# Patient Record
Sex: MALE | Race: WHITE | NOT HISPANIC OR LATINO | Employment: OTHER | URBAN - METROPOLITAN AREA
[De-identification: names, ages, dates, MRNs, and addresses within clinical notes are randomized per-mention and may not be internally consistent; named-entity substitution may affect disease eponyms.]

---

## 2017-01-04 ENCOUNTER — COMMUNICATION - HEALTHEAST (OUTPATIENT)
Dept: FAMILY MEDICINE | Facility: CLINIC | Age: 72
End: 2017-01-04

## 2017-01-04 DIAGNOSIS — E78.5 HYPERLIPIDEMIA: ICD-10-CM

## 2017-01-04 DIAGNOSIS — I10 ESSENTIAL HYPERTENSION: ICD-10-CM

## 2017-01-17 ENCOUNTER — AMBULATORY - HEALTHEAST (OUTPATIENT)
Dept: FAMILY MEDICINE | Facility: CLINIC | Age: 72
End: 2017-01-17

## 2017-01-17 DIAGNOSIS — N28.9 RENAL INSUFFICIENCY: ICD-10-CM

## 2017-01-18 ENCOUNTER — COMMUNICATION - HEALTHEAST (OUTPATIENT)
Dept: FAMILY MEDICINE | Facility: CLINIC | Age: 72
End: 2017-01-18

## 2017-01-18 DIAGNOSIS — E11.9 DIABETES (H): ICD-10-CM

## 2017-01-20 ENCOUNTER — AMBULATORY - HEALTHEAST (OUTPATIENT)
Dept: NURSING | Facility: CLINIC | Age: 72
End: 2017-01-20

## 2017-01-20 ENCOUNTER — AMBULATORY - HEALTHEAST (OUTPATIENT)
Dept: LAB | Facility: CLINIC | Age: 72
End: 2017-01-20

## 2017-01-20 DIAGNOSIS — I10 ESSENTIAL HYPERTENSION WITH GOAL BLOOD PRESSURE LESS THAN 130/80: ICD-10-CM

## 2017-01-20 DIAGNOSIS — N28.9 RENAL INSUFFICIENCY: ICD-10-CM

## 2017-01-20 LAB
CREAT SERPL-MCNC: 1.15 MG/DL (ref 0.7–1.3)
GFR SERPL CREATININE-BSD FRML MDRD: >60 ML/MIN/1.73M2

## 2017-01-24 ENCOUNTER — COMMUNICATION - HEALTHEAST (OUTPATIENT)
Dept: FAMILY MEDICINE | Facility: CLINIC | Age: 72
End: 2017-01-24

## 2017-01-24 DIAGNOSIS — E11.9 TYPE 2 DIABETES MELLITUS (H): ICD-10-CM

## 2017-02-09 ENCOUNTER — OFFICE VISIT - HEALTHEAST (OUTPATIENT)
Dept: FAMILY MEDICINE | Facility: CLINIC | Age: 72
End: 2017-02-09

## 2017-02-09 DIAGNOSIS — N28.9 RENAL INSUFFICIENCY: ICD-10-CM

## 2017-02-09 DIAGNOSIS — E66.01 MORBID OBESITY, UNSPECIFIED OBESITY TYPE (H): ICD-10-CM

## 2017-02-09 DIAGNOSIS — E78.2 MIXED HYPERLIPIDEMIA: ICD-10-CM

## 2017-02-09 DIAGNOSIS — R60.9 EDEMA: ICD-10-CM

## 2017-02-09 DIAGNOSIS — E11.9 DIABETES MELLITUS (H): ICD-10-CM

## 2017-02-09 DIAGNOSIS — Z00.00 HEALTH CARE MAINTENANCE: ICD-10-CM

## 2017-02-09 LAB
CHOLEST SERPL-MCNC: 114 MG/DL
FASTING STATUS PATIENT QL REPORTED: YES
HBA1C MFR BLD: 7.1 % (ref 4.2–6.1)
HDLC SERPL-MCNC: 43 MG/DL
LDLC SERPL CALC-MCNC: 50 MG/DL
PSA SERPL-MCNC: 3.3 NG/ML (ref 0–6.5)
TRIGL SERPL-MCNC: 104 MG/DL

## 2017-02-13 ENCOUNTER — COMMUNICATION - HEALTHEAST (OUTPATIENT)
Dept: FAMILY MEDICINE | Facility: CLINIC | Age: 72
End: 2017-02-13

## 2017-03-29 ENCOUNTER — COMMUNICATION - HEALTHEAST (OUTPATIENT)
Dept: FAMILY MEDICINE | Facility: CLINIC | Age: 72
End: 2017-03-29

## 2017-03-29 DIAGNOSIS — E78.5 HYPERLIPIDEMIA: ICD-10-CM

## 2017-04-03 ENCOUNTER — COMMUNICATION - HEALTHEAST (OUTPATIENT)
Dept: FAMILY MEDICINE | Facility: CLINIC | Age: 72
End: 2017-04-03

## 2017-04-03 DIAGNOSIS — E78.5 HYPERLIPEMIA: ICD-10-CM

## 2017-04-24 ENCOUNTER — COMMUNICATION - HEALTHEAST (OUTPATIENT)
Dept: FAMILY MEDICINE | Facility: CLINIC | Age: 72
End: 2017-04-24

## 2017-04-24 DIAGNOSIS — E11.9 TYPE 2 DIABETES MELLITUS (H): ICD-10-CM

## 2017-05-09 ENCOUNTER — COMMUNICATION - HEALTHEAST (OUTPATIENT)
Dept: NURSING | Facility: CLINIC | Age: 72
End: 2017-05-09

## 2017-05-09 ENCOUNTER — OFFICE VISIT - HEALTHEAST (OUTPATIENT)
Dept: NURSING | Facility: CLINIC | Age: 72
End: 2017-05-09

## 2017-05-09 DIAGNOSIS — E78.1 PURE HYPERGLYCERIDEMIA: ICD-10-CM

## 2017-05-09 DIAGNOSIS — E11.9 TYPE 2 DIABETES MELLITUS WITHOUT COMPLICATION, WITHOUT LONG-TERM CURRENT USE OF INSULIN (H): ICD-10-CM

## 2017-05-09 DIAGNOSIS — M10.9 GOUTY ARTHROPATHY: ICD-10-CM

## 2017-05-09 DIAGNOSIS — E78.5 DYSLIPIDEMIA: ICD-10-CM

## 2017-05-09 DIAGNOSIS — I10 ESSENTIAL HYPERTENSION WITH GOAL BLOOD PRESSURE LESS THAN 140/90: ICD-10-CM

## 2017-05-09 LAB — HBA1C MFR BLD: 7.3 % (ref 3.5–6)

## 2017-06-08 ENCOUNTER — RECORDS - HEALTHEAST (OUTPATIENT)
Dept: ADMINISTRATIVE | Facility: OTHER | Age: 72
End: 2017-06-08

## 2017-07-09 ENCOUNTER — COMMUNICATION - HEALTHEAST (OUTPATIENT)
Dept: FAMILY MEDICINE | Facility: CLINIC | Age: 72
End: 2017-07-09

## 2017-07-09 DIAGNOSIS — I10 ESSENTIAL HYPERTENSION: ICD-10-CM

## 2017-07-21 ENCOUNTER — RECORDS - HEALTHEAST (OUTPATIENT)
Dept: ADMINISTRATIVE | Facility: OTHER | Age: 72
End: 2017-07-21

## 2017-08-10 ENCOUNTER — OFFICE VISIT - HEALTHEAST (OUTPATIENT)
Dept: FAMILY MEDICINE | Facility: CLINIC | Age: 72
End: 2017-08-10

## 2017-08-10 DIAGNOSIS — E66.01 MORBID OBESITY, UNSPECIFIED OBESITY TYPE (H): ICD-10-CM

## 2017-08-10 DIAGNOSIS — R60.9 EDEMA: ICD-10-CM

## 2017-08-10 DIAGNOSIS — E11.9 DIABETES MELLITUS (H): ICD-10-CM

## 2017-08-10 DIAGNOSIS — N40.0 BPH (BENIGN PROSTATIC HYPERPLASIA): ICD-10-CM

## 2017-08-10 DIAGNOSIS — I10 ESSENTIAL HYPERTENSION: ICD-10-CM

## 2017-08-10 DIAGNOSIS — E78.2 MIXED HYPERLIPIDEMIA: ICD-10-CM

## 2017-08-10 LAB
CHOLEST SERPL-MCNC: 108 MG/DL
FASTING STATUS PATIENT QL REPORTED: YES
HBA1C MFR BLD: 6.4 % (ref 3.5–6)
HDLC SERPL-MCNC: 41 MG/DL
LDLC SERPL CALC-MCNC: 42 MG/DL
TRIGL SERPL-MCNC: 125 MG/DL

## 2017-08-15 ENCOUNTER — COMMUNICATION - HEALTHEAST (OUTPATIENT)
Dept: FAMILY MEDICINE | Facility: CLINIC | Age: 72
End: 2017-08-15

## 2017-08-24 ENCOUNTER — RECORDS - HEALTHEAST (OUTPATIENT)
Dept: ADMINISTRATIVE | Facility: OTHER | Age: 72
End: 2017-08-24

## 2017-08-31 ENCOUNTER — AMBULATORY - HEALTHEAST (OUTPATIENT)
Dept: LAB | Facility: CLINIC | Age: 72
End: 2017-08-31

## 2017-08-31 DIAGNOSIS — R60.9 EDEMA: ICD-10-CM

## 2017-08-31 LAB
CREAT SERPL-MCNC: 1.12 MG/DL (ref 0.7–1.3)
GFR SERPL CREATININE-BSD FRML MDRD: >60 ML/MIN/1.73M2

## 2017-09-05 ENCOUNTER — COMMUNICATION - HEALTHEAST (OUTPATIENT)
Dept: FAMILY MEDICINE | Facility: CLINIC | Age: 72
End: 2017-09-05

## 2017-10-04 ENCOUNTER — COMMUNICATION - HEALTHEAST (OUTPATIENT)
Dept: FAMILY MEDICINE | Facility: CLINIC | Age: 72
End: 2017-10-04

## 2017-10-04 DIAGNOSIS — I10 ESSENTIAL HYPERTENSION: ICD-10-CM

## 2017-10-04 DIAGNOSIS — I10 HTN (HYPERTENSION): ICD-10-CM

## 2017-10-17 ENCOUNTER — COMMUNICATION - HEALTHEAST (OUTPATIENT)
Dept: FAMILY MEDICINE | Facility: CLINIC | Age: 72
End: 2017-10-17

## 2017-10-17 DIAGNOSIS — I10 HTN (HYPERTENSION): ICD-10-CM

## 2017-10-30 ENCOUNTER — COMMUNICATION - HEALTHEAST (OUTPATIENT)
Dept: FAMILY MEDICINE | Facility: CLINIC | Age: 72
End: 2017-10-30

## 2017-10-30 DIAGNOSIS — Z79.899 MEDICATION MANAGEMENT: ICD-10-CM

## 2017-11-07 ENCOUNTER — RECORDS - HEALTHEAST (OUTPATIENT)
Dept: ADMINISTRATIVE | Facility: OTHER | Age: 72
End: 2017-11-07

## 2017-11-07 ENCOUNTER — COMMUNICATION - HEALTHEAST (OUTPATIENT)
Dept: FAMILY MEDICINE | Facility: CLINIC | Age: 72
End: 2017-11-07

## 2017-11-16 ENCOUNTER — RECORDS - HEALTHEAST (OUTPATIENT)
Dept: ADMINISTRATIVE | Facility: OTHER | Age: 72
End: 2017-11-16

## 2017-11-27 ENCOUNTER — RECORDS - HEALTHEAST (OUTPATIENT)
Dept: ADMINISTRATIVE | Facility: OTHER | Age: 72
End: 2017-11-27

## 2017-11-29 ENCOUNTER — COMMUNICATION - HEALTHEAST (OUTPATIENT)
Dept: FAMILY MEDICINE | Facility: CLINIC | Age: 72
End: 2017-11-29

## 2017-12-15 ENCOUNTER — OFFICE VISIT - HEALTHEAST (OUTPATIENT)
Dept: FAMILY MEDICINE | Facility: CLINIC | Age: 72
End: 2017-12-15

## 2017-12-15 DIAGNOSIS — Z01.818 PREOP EXAMINATION: ICD-10-CM

## 2017-12-15 DIAGNOSIS — Z23 NEED FOR VACCINATION: ICD-10-CM

## 2017-12-15 LAB
ATRIAL RATE - MUSE: 81 BPM
CREAT SERPL-MCNC: 1.06 MG/DL (ref 0.7–1.3)
DIASTOLIC BLOOD PRESSURE - MUSE: NORMAL MMHG
GFR SERPL CREATININE-BSD FRML MDRD: >60 ML/MIN/1.73M2
INTERPRETATION ECG - MUSE: NORMAL
P AXIS - MUSE: 29 DEGREES
PR INTERVAL - MUSE: 170 MS
QRS DURATION - MUSE: 98 MS
QT - MUSE: 354 MS
QTC - MUSE: 411 MS
R AXIS - MUSE: -1 DEGREES
SYSTOLIC BLOOD PRESSURE - MUSE: NORMAL MMHG
T AXIS - MUSE: 43 DEGREES
VENTRICULAR RATE- MUSE: 81 BPM

## 2017-12-15 ASSESSMENT — MIFFLIN-ST. JEOR: SCORE: 2264.67

## 2017-12-22 ASSESSMENT — MIFFLIN-ST. JEOR: SCORE: 2264.67

## 2017-12-27 ENCOUNTER — ANESTHESIA - HEALTHEAST (OUTPATIENT)
Dept: SURGERY | Facility: HOSPITAL | Age: 72
End: 2017-12-27

## 2017-12-27 ENCOUNTER — SURGERY - HEALTHEAST (OUTPATIENT)
Dept: SURGERY | Facility: HOSPITAL | Age: 72
End: 2017-12-27

## 2017-12-27 ASSESSMENT — MIFFLIN-ST. JEOR: SCORE: 2189.37

## 2017-12-28 ASSESSMENT — MIFFLIN-ST. JEOR: SCORE: 2229.74

## 2017-12-31 ENCOUNTER — COMMUNICATION - HEALTHEAST (OUTPATIENT)
Dept: FAMILY MEDICINE | Facility: CLINIC | Age: 72
End: 2017-12-31

## 2018-01-02 ENCOUNTER — OFFICE VISIT - HEALTHEAST (OUTPATIENT)
Dept: GERIATRICS | Facility: CLINIC | Age: 73
End: 2018-01-02

## 2018-01-02 DIAGNOSIS — R60.0 LOWER EXTREMITY EDEMA: ICD-10-CM

## 2018-01-02 DIAGNOSIS — R52 PAIN MANAGEMENT: ICD-10-CM

## 2018-01-02 DIAGNOSIS — E11.9 DIABETES MELLITUS (H): ICD-10-CM

## 2018-01-02 DIAGNOSIS — I10 HYPERTENSION: ICD-10-CM

## 2018-01-02 DIAGNOSIS — Z90.79 STATUS POST PROSTATECTOMY: ICD-10-CM

## 2018-01-02 DIAGNOSIS — N40.0 BPH (BENIGN PROSTATIC HYPERPLASIA): ICD-10-CM

## 2018-01-04 ENCOUNTER — OFFICE VISIT - HEALTHEAST (OUTPATIENT)
Dept: GERIATRICS | Facility: CLINIC | Age: 73
End: 2018-01-04

## 2018-01-04 DIAGNOSIS — M10.9 GOUTY ARTHROPATHY: ICD-10-CM

## 2018-01-04 DIAGNOSIS — Z90.79 S/P PROSTATECTOMY: ICD-10-CM

## 2018-01-04 DIAGNOSIS — I10 ESSENTIAL HYPERTENSION: ICD-10-CM

## 2018-01-04 DIAGNOSIS — E11.9 TYPE 2 DIABETES MELLITUS (H): ICD-10-CM

## 2018-01-08 ENCOUNTER — COMMUNICATION - HEALTHEAST (OUTPATIENT)
Dept: FAMILY MEDICINE | Facility: CLINIC | Age: 73
End: 2018-01-08

## 2018-01-08 ENCOUNTER — AMBULATORY - HEALTHEAST (OUTPATIENT)
Dept: GERIATRICS | Facility: CLINIC | Age: 73
End: 2018-01-08

## 2018-01-08 ENCOUNTER — COMMUNICATION - HEALTHEAST (OUTPATIENT)
Dept: GERIATRICS | Facility: CLINIC | Age: 73
End: 2018-01-08

## 2018-01-16 ENCOUNTER — OFFICE VISIT - HEALTHEAST (OUTPATIENT)
Dept: FAMILY MEDICINE | Facility: CLINIC | Age: 73
End: 2018-01-16

## 2018-01-16 DIAGNOSIS — I10 ESSENTIAL HYPERTENSION: ICD-10-CM

## 2018-01-16 DIAGNOSIS — F41.9 ANXIETY: ICD-10-CM

## 2018-01-16 DIAGNOSIS — M79.671 PAIN OF RIGHT HEEL: ICD-10-CM

## 2018-01-16 DIAGNOSIS — Z90.79 S/P PROSTATECTOMY: ICD-10-CM

## 2018-02-06 ENCOUNTER — COMMUNICATION - HEALTHEAST (OUTPATIENT)
Dept: FAMILY MEDICINE | Facility: CLINIC | Age: 73
End: 2018-02-06

## 2018-02-06 DIAGNOSIS — Z79.899 MEDICATION MANAGEMENT: ICD-10-CM

## 2018-02-15 ENCOUNTER — COMMUNICATION - HEALTHEAST (OUTPATIENT)
Dept: FAMILY MEDICINE | Facility: CLINIC | Age: 73
End: 2018-02-15

## 2018-02-15 ENCOUNTER — OFFICE VISIT - HEALTHEAST (OUTPATIENT)
Dept: FAMILY MEDICINE | Facility: CLINIC | Age: 73
End: 2018-02-15

## 2018-02-15 DIAGNOSIS — Z12.11 SCREEN FOR COLON CANCER: ICD-10-CM

## 2018-02-15 DIAGNOSIS — F41.9 ANXIETY: ICD-10-CM

## 2018-02-15 DIAGNOSIS — I10 ESSENTIAL HYPERTENSION: ICD-10-CM

## 2018-02-15 DIAGNOSIS — E11.9 TYPE 2 DIABETES MELLITUS (H): ICD-10-CM

## 2018-02-15 LAB
CREAT UR-MCNC: 36.3 MG/DL
HBA1C MFR BLD: 5.7 % (ref 3.5–6)
MICROALBUMIN UR-MCNC: 0.72 MG/DL (ref 0–1.99)
MICROALBUMIN/CREAT UR: 19.8 MG/G

## 2018-02-26 ENCOUNTER — AMBULATORY - HEALTHEAST (OUTPATIENT)
Dept: NURSING | Facility: CLINIC | Age: 73
End: 2018-02-26

## 2018-02-26 ENCOUNTER — COMMUNICATION - HEALTHEAST (OUTPATIENT)
Dept: FAMILY MEDICINE | Facility: CLINIC | Age: 73
End: 2018-02-26

## 2018-03-05 ENCOUNTER — COMMUNICATION - HEALTHEAST (OUTPATIENT)
Dept: FAMILY MEDICINE | Facility: CLINIC | Age: 73
End: 2018-03-05

## 2018-03-05 DIAGNOSIS — I10 ESSENTIAL HYPERTENSION: ICD-10-CM

## 2018-03-14 ENCOUNTER — COMMUNICATION - HEALTHEAST (OUTPATIENT)
Dept: FAMILY MEDICINE | Facility: CLINIC | Age: 73
End: 2018-03-14

## 2018-03-14 ENCOUNTER — RECORDS - HEALTHEAST (OUTPATIENT)
Dept: ADMINISTRATIVE | Facility: OTHER | Age: 73
End: 2018-03-14

## 2018-03-21 ENCOUNTER — RECORDS - HEALTHEAST (OUTPATIENT)
Dept: ADMINISTRATIVE | Facility: OTHER | Age: 73
End: 2018-03-21

## 2018-04-09 ENCOUNTER — COMMUNICATION - HEALTHEAST (OUTPATIENT)
Dept: FAMILY MEDICINE | Facility: CLINIC | Age: 73
End: 2018-04-09

## 2018-04-09 ENCOUNTER — RECORDS - HEALTHEAST (OUTPATIENT)
Dept: ADMINISTRATIVE | Facility: OTHER | Age: 73
End: 2018-04-09

## 2018-04-09 DIAGNOSIS — F41.9 ANXIETY: ICD-10-CM

## 2018-04-16 ENCOUNTER — RECORDS - HEALTHEAST (OUTPATIENT)
Dept: ADMINISTRATIVE | Facility: OTHER | Age: 73
End: 2018-04-16

## 2018-04-16 ENCOUNTER — COMMUNICATION - HEALTHEAST (OUTPATIENT)
Dept: FAMILY MEDICINE | Facility: CLINIC | Age: 73
End: 2018-04-16

## 2018-04-16 DIAGNOSIS — I10 HTN (HYPERTENSION): ICD-10-CM

## 2018-04-17 ENCOUNTER — COMMUNICATION - HEALTHEAST (OUTPATIENT)
Dept: FAMILY MEDICINE | Facility: CLINIC | Age: 73
End: 2018-04-17

## 2018-05-01 ENCOUNTER — COMMUNICATION - HEALTHEAST (OUTPATIENT)
Dept: FAMILY MEDICINE | Facility: CLINIC | Age: 73
End: 2018-05-01

## 2018-05-01 DIAGNOSIS — Z79.899 MEDICATION MANAGEMENT: ICD-10-CM

## 2018-05-01 DIAGNOSIS — E78.5 DYSLIPIDEMIA: ICD-10-CM

## 2018-05-01 DIAGNOSIS — E11.9 TYPE 2 DIABETES MELLITUS WITHOUT COMPLICATION, WITHOUT LONG-TERM CURRENT USE OF INSULIN (H): ICD-10-CM

## 2018-05-02 ENCOUNTER — COMMUNICATION - HEALTHEAST (OUTPATIENT)
Dept: FAMILY MEDICINE | Facility: CLINIC | Age: 73
End: 2018-05-02

## 2018-05-02 DIAGNOSIS — F41.9 ANXIETY: ICD-10-CM

## 2018-05-02 DIAGNOSIS — I10 ESSENTIAL HYPERTENSION: ICD-10-CM

## 2018-05-15 ENCOUNTER — OFFICE VISIT - HEALTHEAST (OUTPATIENT)
Dept: FAMILY MEDICINE | Facility: CLINIC | Age: 73
End: 2018-05-15

## 2018-05-15 DIAGNOSIS — E78.2 MIXED HYPERLIPIDEMIA: ICD-10-CM

## 2018-05-15 DIAGNOSIS — I10 ESSENTIAL HYPERTENSION: ICD-10-CM

## 2018-05-15 DIAGNOSIS — E11.9 DIABETES (H): ICD-10-CM

## 2018-05-25 ENCOUNTER — AMBULATORY - HEALTHEAST (OUTPATIENT)
Dept: LAB | Facility: CLINIC | Age: 73
End: 2018-05-25

## 2018-05-25 DIAGNOSIS — E11.9 DIABETES (H): ICD-10-CM

## 2018-05-25 LAB
ALBUMIN SERPL-MCNC: 4 G/DL (ref 3.5–5)
ALP SERPL-CCNC: 66 U/L (ref 45–120)
ALT SERPL W P-5'-P-CCNC: 18 U/L (ref 0–45)
ANION GAP SERPL CALCULATED.3IONS-SCNC: 13 MMOL/L (ref 5–18)
AST SERPL W P-5'-P-CCNC: 17 U/L (ref 0–40)
BILIRUB SERPL-MCNC: 0.9 MG/DL (ref 0–1)
BUN SERPL-MCNC: 26 MG/DL (ref 8–28)
CALCIUM SERPL-MCNC: 9.8 MG/DL (ref 8.5–10.5)
CHLORIDE BLD-SCNC: 100 MMOL/L (ref 98–107)
CHOLEST SERPL-MCNC: 105 MG/DL
CO2 SERPL-SCNC: 26 MMOL/L (ref 22–31)
CREAT SERPL-MCNC: 1.31 MG/DL (ref 0.7–1.3)
FASTING STATUS PATIENT QL REPORTED: YES
GFR SERPL CREATININE-BSD FRML MDRD: 54 ML/MIN/1.73M2
GLUCOSE BLD-MCNC: 99 MG/DL (ref 70–125)
HBA1C MFR BLD: 5.9 % (ref 3.5–6)
HDLC SERPL-MCNC: 43 MG/DL
LDLC SERPL CALC-MCNC: 39 MG/DL
POTASSIUM BLD-SCNC: 4.4 MMOL/L (ref 3.5–5)
PROT SERPL-MCNC: 7.5 G/DL (ref 6–8)
SODIUM SERPL-SCNC: 139 MMOL/L (ref 136–145)
TRIGL SERPL-MCNC: 116 MG/DL

## 2018-05-30 ENCOUNTER — COMMUNICATION - HEALTHEAST (OUTPATIENT)
Dept: FAMILY MEDICINE | Facility: CLINIC | Age: 73
End: 2018-05-30

## 2018-07-14 ENCOUNTER — COMMUNICATION - HEALTHEAST (OUTPATIENT)
Dept: FAMILY MEDICINE | Facility: CLINIC | Age: 73
End: 2018-07-14

## 2018-07-14 DIAGNOSIS — I10 HTN (HYPERTENSION): ICD-10-CM

## 2018-07-26 ENCOUNTER — COMMUNICATION - HEALTHEAST (OUTPATIENT)
Dept: FAMILY MEDICINE | Facility: CLINIC | Age: 73
End: 2018-07-26

## 2018-07-26 DIAGNOSIS — I10 HTN (HYPERTENSION): ICD-10-CM

## 2018-07-28 ENCOUNTER — COMMUNICATION - HEALTHEAST (OUTPATIENT)
Dept: FAMILY MEDICINE | Facility: CLINIC | Age: 73
End: 2018-07-28

## 2018-07-28 DIAGNOSIS — Z79.899 MEDICATION MANAGEMENT: ICD-10-CM

## 2018-07-28 DIAGNOSIS — E11.9 TYPE 2 DIABETES MELLITUS WITHOUT COMPLICATION, WITHOUT LONG-TERM CURRENT USE OF INSULIN (H): ICD-10-CM

## 2018-11-30 ENCOUNTER — OFFICE VISIT - HEALTHEAST (OUTPATIENT)
Dept: FAMILY MEDICINE | Facility: CLINIC | Age: 73
End: 2018-11-30

## 2018-11-30 DIAGNOSIS — F41.9 ANXIETY: ICD-10-CM

## 2018-11-30 DIAGNOSIS — E11.9 DIABETES MELLITUS (H): ICD-10-CM

## 2018-11-30 DIAGNOSIS — E66.01 MORBID OBESITY (H): ICD-10-CM

## 2018-11-30 DIAGNOSIS — Z12.5 ENCOUNTER FOR SCREENING FOR MALIGNANT NEOPLASM OF PROSTATE: ICD-10-CM

## 2018-11-30 DIAGNOSIS — Z00.00 HEALTH CARE MAINTENANCE: ICD-10-CM

## 2018-11-30 DIAGNOSIS — E78.2 MIXED HYPERLIPIDEMIA: ICD-10-CM

## 2018-11-30 LAB
ALBUMIN SERPL-MCNC: 3.5 G/DL (ref 3.5–5)
ALP SERPL-CCNC: 56 U/L (ref 45–120)
ALT SERPL W P-5'-P-CCNC: 26 U/L (ref 0–45)
ANION GAP SERPL CALCULATED.3IONS-SCNC: 11 MMOL/L (ref 5–18)
AST SERPL W P-5'-P-CCNC: 19 U/L (ref 0–40)
BILIRUB SERPL-MCNC: 0.7 MG/DL (ref 0–1)
BUN SERPL-MCNC: 15 MG/DL (ref 8–28)
CALCIUM SERPL-MCNC: 9.2 MG/DL (ref 8.5–10.5)
CHLORIDE BLD-SCNC: 101 MMOL/L (ref 98–107)
CHOLEST SERPL-MCNC: 109 MG/DL
CO2 SERPL-SCNC: 28 MMOL/L (ref 22–31)
CREAT SERPL-MCNC: 1.15 MG/DL (ref 0.7–1.3)
FASTING STATUS PATIENT QL REPORTED: YES
GFR SERPL CREATININE-BSD FRML MDRD: >60 ML/MIN/1.73M2
GLUCOSE BLD-MCNC: 113 MG/DL (ref 70–125)
HBA1C MFR BLD: 6.4 % (ref 3.5–6)
HDLC SERPL-MCNC: 42 MG/DL
LDLC SERPL CALC-MCNC: 35 MG/DL
POTASSIUM BLD-SCNC: 4.2 MMOL/L (ref 3.5–5)
PROT SERPL-MCNC: 6.8 G/DL (ref 6–8)
SODIUM SERPL-SCNC: 140 MMOL/L (ref 136–145)
TRIGL SERPL-MCNC: 158 MG/DL

## 2018-12-03 ENCOUNTER — COMMUNICATION - HEALTHEAST (OUTPATIENT)
Dept: FAMILY MEDICINE | Facility: CLINIC | Age: 73
End: 2018-12-03

## 2018-12-24 ENCOUNTER — COMMUNICATION - HEALTHEAST (OUTPATIENT)
Dept: FAMILY MEDICINE | Facility: CLINIC | Age: 73
End: 2018-12-24

## 2018-12-24 DIAGNOSIS — E11.9 DIABETES (H): ICD-10-CM

## 2018-12-24 RX ORDER — GLUCOSAMINE HCL/CHONDROITIN SU 500-400 MG
CAPSULE ORAL
Qty: 200 STRIP | Refills: 3 | Status: SHIPPED | OUTPATIENT
Start: 2018-12-24

## 2019-01-21 ENCOUNTER — COMMUNICATION - HEALTHEAST (OUTPATIENT)
Dept: FAMILY MEDICINE | Facility: CLINIC | Age: 74
End: 2019-01-21

## 2019-01-21 DIAGNOSIS — F41.9 ANXIETY: ICD-10-CM

## 2019-01-21 DIAGNOSIS — E78.5 DYSLIPIDEMIA: ICD-10-CM

## 2019-01-23 ENCOUNTER — COMMUNICATION - HEALTHEAST (OUTPATIENT)
Dept: FAMILY MEDICINE | Facility: CLINIC | Age: 74
End: 2019-01-23

## 2019-01-23 DIAGNOSIS — I10 HTN (HYPERTENSION): ICD-10-CM

## 2019-01-25 ENCOUNTER — COMMUNICATION - HEALTHEAST (OUTPATIENT)
Dept: FAMILY MEDICINE | Facility: CLINIC | Age: 74
End: 2019-01-25

## 2019-01-25 DIAGNOSIS — E78.5 DYSLIPIDEMIA: ICD-10-CM

## 2019-01-25 DIAGNOSIS — F41.9 ANXIETY: ICD-10-CM

## 2019-04-16 ENCOUNTER — COMMUNICATION - HEALTHEAST (OUTPATIENT)
Dept: FAMILY MEDICINE | Facility: CLINIC | Age: 74
End: 2019-04-16

## 2019-04-16 DIAGNOSIS — I10 HTN (HYPERTENSION): ICD-10-CM

## 2019-05-07 ENCOUNTER — COMMUNICATION - HEALTHEAST (OUTPATIENT)
Dept: FAMILY MEDICINE | Facility: CLINIC | Age: 74
End: 2019-05-07

## 2019-05-07 DIAGNOSIS — Z79.899 MEDICATION MANAGEMENT: ICD-10-CM

## 2019-05-07 DIAGNOSIS — E11.9 TYPE 2 DIABETES MELLITUS WITHOUT COMPLICATION, WITHOUT LONG-TERM CURRENT USE OF INSULIN (H): ICD-10-CM

## 2019-05-29 ENCOUNTER — OFFICE VISIT - HEALTHEAST (OUTPATIENT)
Dept: FAMILY MEDICINE | Facility: CLINIC | Age: 74
End: 2019-05-29

## 2019-05-29 DIAGNOSIS — E66.813 CLASS 3 SEVERE OBESITY WITH SERIOUS COMORBIDITY AND BODY MASS INDEX (BMI) OF 50.0 TO 59.9 IN ADULT, UNSPECIFIED OBESITY TYPE (H): ICD-10-CM

## 2019-05-29 DIAGNOSIS — Z00.00 HEALTH CARE MAINTENANCE: ICD-10-CM

## 2019-05-29 DIAGNOSIS — R60.9 EDEMA, UNSPECIFIED TYPE: ICD-10-CM

## 2019-05-29 DIAGNOSIS — E66.01 CLASS 3 SEVERE OBESITY WITH SERIOUS COMORBIDITY AND BODY MASS INDEX (BMI) OF 50.0 TO 59.9 IN ADULT, UNSPECIFIED OBESITY TYPE (H): ICD-10-CM

## 2019-05-29 DIAGNOSIS — F41.9 ANXIETY: ICD-10-CM

## 2019-05-29 DIAGNOSIS — E11.9 DIABETES MELLITUS (H): ICD-10-CM

## 2019-05-29 DIAGNOSIS — I10 ESSENTIAL HYPERTENSION: ICD-10-CM

## 2019-05-29 DIAGNOSIS — Z79.899 MEDICATION MANAGEMENT: ICD-10-CM

## 2019-05-29 DIAGNOSIS — E11.9 TYPE 2 DIABETES MELLITUS WITHOUT COMPLICATION, WITHOUT LONG-TERM CURRENT USE OF INSULIN (H): ICD-10-CM

## 2019-05-29 DIAGNOSIS — Z12.5 ENCOUNTER FOR SCREENING FOR MALIGNANT NEOPLASM OF PROSTATE: ICD-10-CM

## 2019-05-29 LAB
ALBUMIN SERPL-MCNC: 3.8 G/DL (ref 3.5–5)
ALP SERPL-CCNC: 59 U/L (ref 45–120)
ALT SERPL W P-5'-P-CCNC: 28 U/L (ref 0–45)
ANION GAP SERPL CALCULATED.3IONS-SCNC: 13 MMOL/L (ref 5–18)
AST SERPL W P-5'-P-CCNC: 19 U/L (ref 0–40)
BILIRUB SERPL-MCNC: 0.6 MG/DL (ref 0–1)
BUN SERPL-MCNC: 19 MG/DL (ref 8–28)
CALCIUM SERPL-MCNC: 9.6 MG/DL (ref 8.5–10.5)
CHLORIDE BLD-SCNC: 98 MMOL/L (ref 98–107)
CHOLEST SERPL-MCNC: 101 MG/DL
CO2 SERPL-SCNC: 28 MMOL/L (ref 22–31)
CREAT SERPL-MCNC: 1.12 MG/DL (ref 0.7–1.3)
CREAT UR-MCNC: 32.4 MG/DL
FASTING STATUS PATIENT QL REPORTED: YES
GFR SERPL CREATININE-BSD FRML MDRD: >60 ML/MIN/1.73M2
GLUCOSE BLD-MCNC: 115 MG/DL (ref 70–125)
HBA1C MFR BLD: 6.2 % (ref 3.5–6)
HDLC SERPL-MCNC: 44 MG/DL
LDLC SERPL CALC-MCNC: 29 MG/DL
MICROALBUMIN UR-MCNC: <0.5 MG/DL (ref 0–1.99)
MICROALBUMIN/CREAT UR: NORMAL MG/G
POTASSIUM BLD-SCNC: 3.8 MMOL/L (ref 3.5–5)
PROT SERPL-MCNC: 7.2 G/DL (ref 6–8)
PSA SERPL-MCNC: 0.6 NG/ML (ref 0–6.5)
SODIUM SERPL-SCNC: 139 MMOL/L (ref 136–145)
TRIGL SERPL-MCNC: 138 MG/DL

## 2019-05-29 ASSESSMENT — MIFFLIN-ST. JEOR: SCORE: 2299.2

## 2019-06-03 ENCOUNTER — COMMUNICATION - HEALTHEAST (OUTPATIENT)
Dept: FAMILY MEDICINE | Facility: CLINIC | Age: 74
End: 2019-06-03

## 2019-06-03 ENCOUNTER — AMBULATORY - HEALTHEAST (OUTPATIENT)
Dept: FAMILY MEDICINE | Facility: CLINIC | Age: 74
End: 2019-06-03

## 2019-06-03 DIAGNOSIS — E78.5 DYSLIPIDEMIA: ICD-10-CM

## 2019-09-24 ENCOUNTER — COMMUNICATION - HEALTHEAST (OUTPATIENT)
Dept: FAMILY MEDICINE | Facility: CLINIC | Age: 74
End: 2019-09-24

## 2019-09-24 ENCOUNTER — AMBULATORY - HEALTHEAST (OUTPATIENT)
Dept: FAMILY MEDICINE | Facility: CLINIC | Age: 74
End: 2019-09-24

## 2019-09-24 DIAGNOSIS — E78.5 DYSLIPIDEMIA: ICD-10-CM

## 2019-10-01 ENCOUNTER — COMMUNICATION - HEALTHEAST (OUTPATIENT)
Dept: FAMILY MEDICINE | Facility: CLINIC | Age: 74
End: 2019-10-01

## 2019-10-01 DIAGNOSIS — E78.5 DYSLIPIDEMIA: ICD-10-CM

## 2019-10-03 ENCOUNTER — COMMUNICATION - HEALTHEAST (OUTPATIENT)
Dept: FAMILY MEDICINE | Facility: CLINIC | Age: 74
End: 2019-10-03

## 2019-10-03 DIAGNOSIS — E78.2 MIXED HYPERLIPIDEMIA: ICD-10-CM

## 2019-10-10 ENCOUNTER — COMMUNICATION - HEALTHEAST (OUTPATIENT)
Dept: FAMILY MEDICINE | Facility: CLINIC | Age: 74
End: 2019-10-10

## 2019-10-10 DIAGNOSIS — I10 HTN (HYPERTENSION): ICD-10-CM

## 2019-10-22 ENCOUNTER — COMMUNICATION - HEALTHEAST (OUTPATIENT)
Dept: FAMILY MEDICINE | Facility: CLINIC | Age: 74
End: 2019-10-22

## 2019-10-22 DIAGNOSIS — F41.9 ANXIETY: ICD-10-CM

## 2019-10-29 ENCOUNTER — RECORDS - HEALTHEAST (OUTPATIENT)
Dept: ADMINISTRATIVE | Facility: OTHER | Age: 74
End: 2019-10-29

## 2019-11-01 ENCOUNTER — COMMUNICATION - HEALTHEAST (OUTPATIENT)
Dept: FAMILY MEDICINE | Facility: CLINIC | Age: 74
End: 2019-11-01

## 2019-11-01 DIAGNOSIS — E11.9 DIABETES (H): ICD-10-CM

## 2019-11-06 ENCOUNTER — RECORDS - HEALTHEAST (OUTPATIENT)
Dept: HEALTH INFORMATION MANAGEMENT | Facility: CLINIC | Age: 74
End: 2019-11-06

## 2019-12-03 ENCOUNTER — OFFICE VISIT - HEALTHEAST (OUTPATIENT)
Dept: FAMILY MEDICINE | Facility: CLINIC | Age: 74
End: 2019-12-03

## 2019-12-03 DIAGNOSIS — Z23 NEED FOR TD VACCINE: ICD-10-CM

## 2019-12-03 DIAGNOSIS — E11.9 TYPE 2 DIABETES MELLITUS (H): ICD-10-CM

## 2019-12-03 DIAGNOSIS — I10 ESSENTIAL HYPERTENSION: ICD-10-CM

## 2019-12-03 DIAGNOSIS — F41.9 ANXIETY: ICD-10-CM

## 2019-12-03 DIAGNOSIS — E78.2 MIXED HYPERLIPIDEMIA: ICD-10-CM

## 2019-12-03 DIAGNOSIS — E66.01 CLASS 3 SEVERE OBESITY WITH SERIOUS COMORBIDITY AND BODY MASS INDEX (BMI) OF 50.0 TO 59.9 IN ADULT, UNSPECIFIED OBESITY TYPE (H): ICD-10-CM

## 2019-12-03 DIAGNOSIS — E66.813 CLASS 3 SEVERE OBESITY WITH SERIOUS COMORBIDITY AND BODY MASS INDEX (BMI) OF 50.0 TO 59.9 IN ADULT, UNSPECIFIED OBESITY TYPE (H): ICD-10-CM

## 2019-12-03 LAB
ALBUMIN SERPL-MCNC: 4 G/DL (ref 3.5–5)
ALP SERPL-CCNC: 65 U/L (ref 45–120)
ALT SERPL W P-5'-P-CCNC: 22 U/L (ref 0–45)
ANION GAP SERPL CALCULATED.3IONS-SCNC: 12 MMOL/L (ref 5–18)
AST SERPL W P-5'-P-CCNC: 18 U/L (ref 0–40)
BILIRUB SERPL-MCNC: 0.7 MG/DL (ref 0–1)
BUN SERPL-MCNC: 23 MG/DL (ref 8–28)
CALCIUM SERPL-MCNC: 9.4 MG/DL (ref 8.5–10.5)
CHLORIDE BLD-SCNC: 97 MMOL/L (ref 98–107)
CHOLEST SERPL-MCNC: 104 MG/DL
CO2 SERPL-SCNC: 29 MMOL/L (ref 22–31)
CREAT SERPL-MCNC: 1.36 MG/DL (ref 0.7–1.3)
ERYTHROCYTE [DISTWIDTH] IN BLOOD BY AUTOMATED COUNT: 12.7 % (ref 11–14.5)
FASTING STATUS PATIENT QL REPORTED: YES
GFR SERPL CREATININE-BSD FRML MDRD: 51 ML/MIN/1.73M2
GLUCOSE BLD-MCNC: 117 MG/DL (ref 70–125)
HBA1C MFR BLD: 6.4 % (ref 3.5–6)
HCT VFR BLD AUTO: 48.1 % (ref 40–54)
HDLC SERPL-MCNC: 41 MG/DL
HGB BLD-MCNC: 16 G/DL (ref 14–18)
LDLC SERPL CALC-MCNC: 31 MG/DL
MCH RBC QN AUTO: 30.4 PG (ref 27–34)
MCHC RBC AUTO-ENTMCNC: 33.2 G/DL (ref 32–36)
MCV RBC AUTO: 92 FL (ref 80–100)
PLATELET # BLD AUTO: 107 THOU/UL (ref 140–440)
PMV BLD AUTO: 9.9 FL (ref 7–10)
POTASSIUM BLD-SCNC: 3.8 MMOL/L (ref 3.5–5)
PROT SERPL-MCNC: 7.6 G/DL (ref 6–8)
RBC # BLD AUTO: 5.25 MILL/UL (ref 4.4–6.2)
SODIUM SERPL-SCNC: 138 MMOL/L (ref 136–145)
TRIGL SERPL-MCNC: 160 MG/DL
WBC: 10.5 THOU/UL (ref 4–11)

## 2019-12-09 ENCOUNTER — AMBULATORY - HEALTHEAST (OUTPATIENT)
Dept: FAMILY MEDICINE | Facility: CLINIC | Age: 74
End: 2019-12-09

## 2019-12-09 ENCOUNTER — COMMUNICATION - HEALTHEAST (OUTPATIENT)
Dept: FAMILY MEDICINE | Facility: CLINIC | Age: 74
End: 2019-12-09

## 2019-12-09 DIAGNOSIS — N28.9 RENAL INSUFFICIENCY: ICD-10-CM

## 2019-12-23 ENCOUNTER — AMBULATORY - HEALTHEAST (OUTPATIENT)
Dept: LAB | Facility: CLINIC | Age: 74
End: 2019-12-23

## 2019-12-23 DIAGNOSIS — N28.9 RENAL INSUFFICIENCY: ICD-10-CM

## 2019-12-23 LAB
CREAT SERPL-MCNC: 1.29 MG/DL (ref 0.7–1.3)
GFR SERPL CREATININE-BSD FRML MDRD: 55 ML/MIN/1.73M2

## 2019-12-26 ENCOUNTER — COMMUNICATION - HEALTHEAST (OUTPATIENT)
Dept: FAMILY MEDICINE | Facility: CLINIC | Age: 74
End: 2019-12-26

## 2020-01-06 ENCOUNTER — COMMUNICATION - HEALTHEAST (OUTPATIENT)
Dept: FAMILY MEDICINE | Facility: CLINIC | Age: 75
End: 2020-01-06

## 2020-01-06 DIAGNOSIS — I10 HTN (HYPERTENSION): ICD-10-CM

## 2020-01-07 ENCOUNTER — COMMUNICATION - HEALTHEAST (OUTPATIENT)
Dept: FAMILY MEDICINE | Facility: CLINIC | Age: 75
End: 2020-01-07

## 2020-01-07 DIAGNOSIS — I10 ESSENTIAL HYPERTENSION: ICD-10-CM

## 2020-01-22 ENCOUNTER — COMMUNICATION - HEALTHEAST (OUTPATIENT)
Dept: FAMILY MEDICINE | Facility: CLINIC | Age: 75
End: 2020-01-22

## 2020-01-22 DIAGNOSIS — E11.9 TYPE 2 DIABETES MELLITUS WITHOUT COMPLICATION, WITHOUT LONG-TERM CURRENT USE OF INSULIN (H): ICD-10-CM

## 2020-01-22 DIAGNOSIS — F41.9 ANXIETY: ICD-10-CM

## 2020-01-27 ENCOUNTER — COMMUNICATION - HEALTHEAST (OUTPATIENT)
Dept: FAMILY MEDICINE | Facility: CLINIC | Age: 75
End: 2020-01-27

## 2020-01-27 DIAGNOSIS — Z79.899 MEDICATION MANAGEMENT: ICD-10-CM

## 2020-03-02 ENCOUNTER — RECORDS - HEALTHEAST (OUTPATIENT)
Dept: ADMINISTRATIVE | Facility: OTHER | Age: 75
End: 2020-03-02

## 2020-06-29 ENCOUNTER — COMMUNICATION - HEALTHEAST (OUTPATIENT)
Dept: FAMILY MEDICINE | Facility: CLINIC | Age: 75
End: 2020-06-29

## 2020-06-29 DIAGNOSIS — I10 HTN (HYPERTENSION): ICD-10-CM

## 2020-07-15 ENCOUNTER — COMMUNICATION - HEALTHEAST (OUTPATIENT)
Dept: FAMILY MEDICINE | Facility: CLINIC | Age: 75
End: 2020-07-15

## 2020-07-15 DIAGNOSIS — E11.9 TYPE 2 DIABETES MELLITUS WITHOUT COMPLICATION, WITHOUT LONG-TERM CURRENT USE OF INSULIN (H): ICD-10-CM

## 2020-07-15 RX ORDER — METFORMIN HCL 500 MG
TABLET, EXTENDED RELEASE 24 HR ORAL
Qty: 270 TABLET | Refills: 3 | Status: SHIPPED | OUTPATIENT
Start: 2020-07-15 | End: 2021-08-27

## 2020-08-04 ENCOUNTER — COMMUNICATION - HEALTHEAST (OUTPATIENT)
Dept: FAMILY MEDICINE | Facility: CLINIC | Age: 75
End: 2020-08-04

## 2020-08-04 DIAGNOSIS — E11.9 DIABETES (H): ICD-10-CM

## 2020-08-10 ENCOUNTER — COMMUNICATION - HEALTHEAST (OUTPATIENT)
Dept: FAMILY MEDICINE | Facility: CLINIC | Age: 75
End: 2020-08-10

## 2020-08-10 DIAGNOSIS — E11.9 TYPE 2 DIABETES MELLITUS WITHOUT COMPLICATION, WITH LONG-TERM CURRENT USE OF INSULIN (H): ICD-10-CM

## 2020-08-10 DIAGNOSIS — Z79.4 TYPE 2 DIABETES MELLITUS WITHOUT COMPLICATION, WITH LONG-TERM CURRENT USE OF INSULIN (H): ICD-10-CM

## 2020-08-13 ENCOUNTER — COMMUNICATION - HEALTHEAST (OUTPATIENT)
Dept: FAMILY MEDICINE | Facility: CLINIC | Age: 75
End: 2020-08-13

## 2020-08-13 DIAGNOSIS — Z79.4 TYPE 2 DIABETES MELLITUS WITHOUT COMPLICATION, WITH LONG-TERM CURRENT USE OF INSULIN (H): ICD-10-CM

## 2020-08-13 DIAGNOSIS — E11.9 TYPE 2 DIABETES MELLITUS WITHOUT COMPLICATION, WITH LONG-TERM CURRENT USE OF INSULIN (H): ICD-10-CM

## 2020-08-21 ENCOUNTER — OFFICE VISIT - HEALTHEAST (OUTPATIENT)
Dept: FAMILY MEDICINE | Facility: CLINIC | Age: 75
End: 2020-08-21

## 2020-08-21 ENCOUNTER — COMMUNICATION - HEALTHEAST (OUTPATIENT)
Dept: FAMILY MEDICINE | Facility: CLINIC | Age: 75
End: 2020-08-21

## 2020-08-21 DIAGNOSIS — E78.2 MIXED HYPERLIPIDEMIA: ICD-10-CM

## 2020-08-21 DIAGNOSIS — E11.9 TYPE 2 DIABETES MELLITUS (H): ICD-10-CM

## 2020-08-21 DIAGNOSIS — Z12.5 SCREENING FOR PROSTATE CANCER: ICD-10-CM

## 2020-08-21 DIAGNOSIS — I10 ESSENTIAL HYPERTENSION: ICD-10-CM

## 2020-08-21 LAB
ALBUMIN SERPL-MCNC: 3.9 G/DL (ref 3.5–5)
ALP SERPL-CCNC: 60 U/L (ref 45–120)
ALT SERPL W P-5'-P-CCNC: 22 U/L (ref 0–45)
ANION GAP SERPL CALCULATED.3IONS-SCNC: 14 MMOL/L (ref 5–18)
AST SERPL W P-5'-P-CCNC: 15 U/L (ref 0–40)
BILIRUB SERPL-MCNC: 0.8 MG/DL (ref 0–1)
BUN SERPL-MCNC: 18 MG/DL (ref 8–28)
CALCIUM SERPL-MCNC: 9 MG/DL (ref 8.5–10.5)
CHLORIDE BLD-SCNC: 98 MMOL/L (ref 98–107)
CHOLEST SERPL-MCNC: 106 MG/DL
CO2 SERPL-SCNC: 25 MMOL/L (ref 22–31)
CREAT SERPL-MCNC: 1.19 MG/DL (ref 0.7–1.3)
CREAT UR-MCNC: 69.7 MG/DL
FASTING STATUS PATIENT QL REPORTED: YES
GFR SERPL CREATININE-BSD FRML MDRD: 60 ML/MIN/1.73M2
GLUCOSE BLD-MCNC: 108 MG/DL (ref 70–125)
HBA1C MFR BLD: 6 %
HDLC SERPL-MCNC: 41 MG/DL
LDLC SERPL CALC-MCNC: 35 MG/DL
MICROALBUMIN UR-MCNC: 2.71 MG/DL (ref 0–1.99)
MICROALBUMIN/CREAT UR: 38.9 MG/G
POTASSIUM BLD-SCNC: 3.7 MMOL/L (ref 3.5–5)
PROT SERPL-MCNC: 7.4 G/DL (ref 6–8)
PSA SERPL-MCNC: 0.7 NG/ML (ref 0–6.5)
SODIUM SERPL-SCNC: 137 MMOL/L (ref 136–145)
TRIGL SERPL-MCNC: 151 MG/DL

## 2020-08-21 RX ORDER — LATANOPROST 50 UG/ML
SOLUTION/ DROPS OPHTHALMIC
Status: SHIPPED | COMMUNITY
Start: 2020-08-17 | End: 2022-03-02

## 2020-08-21 RX ORDER — SILVER SULFADIAZINE 10 MG/G
1 CREAM TOPICAL 2 TIMES DAILY PRN
Qty: 50 G | Refills: 0 | Status: SHIPPED | OUTPATIENT
Start: 2020-08-21

## 2020-08-21 ASSESSMENT — MIFFLIN-ST. JEOR: SCORE: 2227.47

## 2020-08-24 ENCOUNTER — COMMUNICATION - HEALTHEAST (OUTPATIENT)
Dept: FAMILY MEDICINE | Facility: CLINIC | Age: 75
End: 2020-08-24

## 2020-09-07 ENCOUNTER — COMMUNICATION - HEALTHEAST (OUTPATIENT)
Dept: FAMILY MEDICINE | Facility: CLINIC | Age: 75
End: 2020-09-07

## 2020-09-07 DIAGNOSIS — E78.2 MIXED HYPERLIPIDEMIA: ICD-10-CM

## 2020-09-08 RX ORDER — ATORVASTATIN CALCIUM 10 MG/1
10 TABLET, FILM COATED ORAL DAILY
Qty: 90 TABLET | Refills: 3 | Status: SHIPPED | OUTPATIENT
Start: 2020-09-08 | End: 2021-09-05

## 2020-10-05 ENCOUNTER — AMBULATORY - HEALTHEAST (OUTPATIENT)
Dept: NURSING | Facility: CLINIC | Age: 75
End: 2020-10-05

## 2020-10-09 ENCOUNTER — COMMUNICATION - HEALTHEAST (OUTPATIENT)
Dept: FAMILY MEDICINE | Facility: CLINIC | Age: 75
End: 2020-10-09

## 2020-10-09 DIAGNOSIS — F41.9 ANXIETY: ICD-10-CM

## 2020-10-12 RX ORDER — SERTRALINE HYDROCHLORIDE 25 MG/1
25 TABLET, FILM COATED ORAL DAILY
Qty: 90 TABLET | Refills: 3 | Status: SHIPPED | OUTPATIENT
Start: 2020-10-12 | End: 2021-09-23

## 2020-12-20 ENCOUNTER — COMMUNICATION - HEALTHEAST (OUTPATIENT)
Dept: FAMILY MEDICINE | Facility: CLINIC | Age: 75
End: 2020-12-20

## 2020-12-20 DIAGNOSIS — I10 ESSENTIAL HYPERTENSION: ICD-10-CM

## 2020-12-20 DIAGNOSIS — I10 HTN (HYPERTENSION): ICD-10-CM

## 2020-12-22 RX ORDER — HYDROCHLOROTHIAZIDE 25 MG/1
TABLET ORAL
Qty: 90 TABLET | Refills: 2 | Status: SHIPPED | OUTPATIENT
Start: 2020-12-22 | End: 2021-09-05

## 2020-12-22 RX ORDER — LISINOPRIL 10 MG/1
TABLET ORAL
Qty: 90 TABLET | Refills: 2 | Status: SHIPPED | OUTPATIENT
Start: 2020-12-22 | End: 2021-09-05

## 2021-01-07 ENCOUNTER — COMMUNICATION - HEALTHEAST (OUTPATIENT)
Dept: FAMILY MEDICINE | Facility: CLINIC | Age: 76
End: 2021-01-07

## 2021-01-07 DIAGNOSIS — I10 HTN (HYPERTENSION): ICD-10-CM

## 2021-01-07 DIAGNOSIS — Z79.899 MEDICATION MANAGEMENT: ICD-10-CM

## 2021-01-07 RX ORDER — FUROSEMIDE 20 MG
TABLET ORAL
Qty: 90 TABLET | Refills: 2 | Status: SHIPPED | OUTPATIENT
Start: 2021-01-07 | End: 2021-09-23

## 2021-01-07 RX ORDER — ALLOPURINOL 300 MG/1
300 TABLET ORAL DAILY
Qty: 90 TABLET | Refills: 2 | Status: SHIPPED | OUTPATIENT
Start: 2021-01-07 | End: 2021-09-23

## 2021-02-24 ENCOUNTER — OFFICE VISIT - HEALTHEAST (OUTPATIENT)
Dept: FAMILY MEDICINE | Facility: CLINIC | Age: 76
End: 2021-02-24

## 2021-02-24 DIAGNOSIS — F41.9 ANXIETY: ICD-10-CM

## 2021-02-24 DIAGNOSIS — E66.01 MORBID OBESITY (H): ICD-10-CM

## 2021-02-24 DIAGNOSIS — I10 ESSENTIAL HYPERTENSION: ICD-10-CM

## 2021-02-24 DIAGNOSIS — E11.9 TYPE 2 DIABETES MELLITUS (H): ICD-10-CM

## 2021-02-24 LAB
ALBUMIN SERPL-MCNC: 4 G/DL (ref 3.5–5)
ALP SERPL-CCNC: 57 U/L (ref 45–120)
ALT SERPL W P-5'-P-CCNC: 23 U/L (ref 0–45)
ANION GAP SERPL CALCULATED.3IONS-SCNC: 12 MMOL/L (ref 5–18)
AST SERPL W P-5'-P-CCNC: 16 U/L (ref 0–40)
BILIRUB SERPL-MCNC: 0.9 MG/DL (ref 0–1)
BUN SERPL-MCNC: 18 MG/DL (ref 8–28)
CALCIUM SERPL-MCNC: 9.3 MG/DL (ref 8.5–10.5)
CHLORIDE BLD-SCNC: 97 MMOL/L (ref 98–107)
CO2 SERPL-SCNC: 29 MMOL/L (ref 22–31)
CREAT SERPL-MCNC: 1.12 MG/DL (ref 0.7–1.3)
GFR SERPL CREATININE-BSD FRML MDRD: >60 ML/MIN/1.73M2
GLUCOSE BLD-MCNC: 114 MG/DL (ref 70–125)
HBA1C MFR BLD: 5.9 %
POTASSIUM BLD-SCNC: 3.8 MMOL/L (ref 3.5–5)
PROT SERPL-MCNC: 7.5 G/DL (ref 6–8)
SODIUM SERPL-SCNC: 138 MMOL/L (ref 136–145)

## 2021-02-25 ENCOUNTER — COMMUNICATION - HEALTHEAST (OUTPATIENT)
Dept: FAMILY MEDICINE | Facility: CLINIC | Age: 76
End: 2021-02-25

## 2021-03-08 ENCOUNTER — AMBULATORY - HEALTHEAST (OUTPATIENT)
Dept: NURSING | Facility: CLINIC | Age: 76
End: 2021-03-08

## 2021-03-29 ENCOUNTER — AMBULATORY - HEALTHEAST (OUTPATIENT)
Dept: NURSING | Facility: CLINIC | Age: 76
End: 2021-03-29

## 2021-03-31 ENCOUNTER — COMMUNICATION - HEALTHEAST (OUTPATIENT)
Dept: FAMILY MEDICINE | Facility: CLINIC | Age: 76
End: 2021-03-31

## 2021-03-31 DIAGNOSIS — E11.9 DIABETES (H): ICD-10-CM

## 2021-03-31 RX ORDER — GLUCOSAMINE HCL/CHONDROITIN SU 500-400 MG
1 CAPSULE ORAL 2 TIMES DAILY
Qty: 200 STRIP | Refills: 1 | Status: SHIPPED | OUTPATIENT
Start: 2021-03-31

## 2021-05-27 NOTE — TELEPHONE ENCOUNTER
Refill Approved    Rx renewed per Medication Renewal Policy. Medication was last renewed on 7/16/18.    Shagufta Choudhary, [facility] Triage/Med Refill 4/17/2019     Requested Prescriptions   Pending Prescriptions Disp Refills     furosemide (LASIX) 20 MG tablet 90 tablet 2     Sig: Take 1 tablet (20 mg total) by mouth daily.       Diuretics/Combination Diuretics Refill Protocol  Passed - 4/16/2019  3:28 PM        Passed - Visit with PCP or prescribing provider visit in past 12 months     Last office visit with prescriber/PCP: 11/30/2018 Ba Miguel MD OR same dept: 11/30/2018 Ba Miguel MD OR same specialty: 11/30/2018 Ba Miguel MD  Last physical: 12/15/2017 Last MTM visit: Visit date not found   Next visit within 3 mo: Visit date not found  Next physical within 3 mo: Visit date not found  Prescriber OR PCP: Ba Miguel MD  Last diagnosis associated with med order: 1. HTN (hypertension)  - furosemide (LASIX) 20 MG tablet; Take 1 tablet (20 mg total) by mouth daily.  Dispense: 90 tablet; Refill: 2    If protocol passes may refill for 12 months if within 3 months of last provider visit (or a total of 15 months).             Passed - Serum Potassium in past 12 months      Lab Results   Component Value Date    Potassium 4.2 11/30/2018             Passed - Serum Sodium in past 12 months      Lab Results   Component Value Date    Sodium 140 11/30/2018             Passed - Blood pressure on file in past 12 months     BP Readings from Last 1 Encounters:   11/30/18 132/75             Passed - Serum Creatinine in past 12 months      Creatinine   Date Value Ref Range Status   11/30/2018 1.15 0.70 - 1.30 mg/dL Final

## 2021-05-28 NOTE — TELEPHONE ENCOUNTER
RN cannot approve Refill Request    RN can NOT refill this medication overdue for office visits and/or labs.    Clemente Barreto, [facility] Triage/Med Refill 5/7/2019    Requested Prescriptions   Pending Prescriptions Disp Refills     allopurinol (ZYLOPRIM) 300 MG tablet 90 tablet 2     Sig: Take 1 tablet (300 mg total) by mouth daily.       Allopurinol/Febuxostat Refill Protocol  Passed - 5/7/2019 10:17 AM        Passed - LFT or AST or ALT in last 12 months     Albumin   Date Value Ref Range Status   11/30/2018 3.5 3.5 - 5.0 g/dL Final     Bilirubin, Total   Date Value Ref Range Status   11/30/2018 0.7 0.0 - 1.0 mg/dL Final     Alkaline Phosphatase   Date Value Ref Range Status   11/30/2018 56 45 - 120 U/L Final     AST   Date Value Ref Range Status   11/30/2018 19 0 - 40 U/L Final     ALT   Date Value Ref Range Status   11/30/2018 26 0 - 45 U/L Final     Protein, Total   Date Value Ref Range Status   11/30/2018 6.8 6.0 - 8.0 g/dL Final                Passed - Visit with PCP or prescribing provider visit in past 12 months     Last office visit with prescriber/PCP: 11/30/2018 Ba Miguel MD OR same dept: 11/30/2018 Ba Miguel MD OR same specialty: 11/30/2018 Ba Miguel MD  Last physical: 12/15/2017 Last MTM visit: Visit date not found   Next visit within 3 mo: Visit date not found  Next physical within 3 mo: Visit date not found  Prescriber OR PCP: aB Miguel MD  Last diagnosis associated with med order: 1. Medication management  - allopurinol (ZYLOPRIM) 300 MG tablet; Take 1 tablet (300 mg total) by mouth daily.  Dispense: 90 tablet; Refill: 2    2. Type 2 diabetes mellitus without complication, without long-term current use of insulin (H)  - metFORMIN (GLUCOPHAGE-XR) 500 MG 24 hr tablet; TAKE 3 TABLETS BY MOUTH ONCE DAILY WITH BREAKFAST  Dispense: 270 tablet; Refill: 2    If protocol passes may refill for 12 months if within 3 months of last provider visit (or a  total of 15 months).             metFORMIN (GLUCOPHAGE-XR) 500 MG 24 hr tablet 270 tablet 2     Sig: TAKE 3 TABLETS BY MOUTH ONCE DAILY WITH BREAKFAST       Metformin Refill Protocol Failed - 5/7/2019 10:17 AM        Failed - Microalbumin in last year      Microalbumin, Random Urine   Date Value Ref Range Status   02/15/2018 0.72 0.00 - 1.99 mg/dL Final                  Passed - Blood pressure in last 12 months     BP Readings from Last 1 Encounters:   11/30/18 132/75             Passed - LFT or AST or ALT in last 12 months     Albumin   Date Value Ref Range Status   11/30/2018 3.5 3.5 - 5.0 g/dL Final     Bilirubin, Total   Date Value Ref Range Status   11/30/2018 0.7 0.0 - 1.0 mg/dL Final     Alkaline Phosphatase   Date Value Ref Range Status   11/30/2018 56 45 - 120 U/L Final     AST   Date Value Ref Range Status   11/30/2018 19 0 - 40 U/L Final     ALT   Date Value Ref Range Status   11/30/2018 26 0 - 45 U/L Final     Protein, Total   Date Value Ref Range Status   11/30/2018 6.8 6.0 - 8.0 g/dL Final                Passed - GFR or Serum Creatinine in last 6 months     GFR MDRD Non Af Amer   Date Value Ref Range Status   11/30/2018 >60 >60 mL/min/1.73m2 Final     GFR MDRD Af Amer   Date Value Ref Range Status   11/30/2018 >60 >60 mL/min/1.73m2 Final             Passed - Visit with PCP or prescribing provider visit in last 6 months or next 3 months     Last office visit with prescriber/PCP: 11/30/2018 OR same dept: 11/30/2018 Ba Miguel MD OR same specialty: 11/30/2018 Ba Miguel MD Last physical: Visit date not found Last MTM visit: Visit date not found         Next appt within 3 mo: Visit date not found  Next physical within 3 mo: Visit date not found  Prescriber OR PCP: Ba Miguel MD  Last diagnosis associated with med order: 1. Medication management  - allopurinol (ZYLOPRIM) 300 MG tablet; Take 1 tablet (300 mg total) by mouth daily.  Dispense: 90 tablet; Refill: 2    2. Type  2 diabetes mellitus without complication, without long-term current use of insulin (H)  - metFORMIN (GLUCOPHAGE-XR) 500 MG 24 hr tablet; TAKE 3 TABLETS BY MOUTH ONCE DAILY WITH BREAKFAST  Dispense: 270 tablet; Refill: 2     If protocol passes may refill for 12 months if within 3 months of last provider visit (or a total of 15 months).           Passed - A1C in last 6 months     Hemoglobin A1c   Date Value Ref Range Status   11/30/2018 6.4 (H) 3.5 - 6.0 % Final

## 2021-05-29 NOTE — TELEPHONE ENCOUNTER
Reason contacted:  Results  Information relayed:  MD message below  Additional questions:  No  Further follow-up needed:  No  Okay to leave a detailed message:  No     Letter sent

## 2021-05-29 NOTE — PROGRESS NOTES
ASSESSMENT/PLAN  1. Diabetes mellitus (H)  Hemoglobin A1c at goal range at 6.2%  Continue with metformin at this time  Continue to work on diet and exercise the patient is down about 14 pounds since I saw him last  Encouraged him to try to start exercising on a regular basis  No medication changes made today  Follow-up at 6 months  - Glycosylated Hemoglobin A1c  - Microalbumin, Random Urine    2. Essential hypertension  Blood pressure at goal range continue with hydrochlorothiazide  Check kidney function and potassium levels today  - hydroCHLOROthiazide (HYDRODIURIL) 25 MG tablet; Take 1 tablet (25 mg total) by mouth daily.  Dispense: 90 tablet; Refill: 1    3. Medication management  Patient continues to take allopurinol for prevention of gout flares which is working well  Check kidney function today  - allopurinol (ZYLOPRIM) 300 MG tablet; Take 1 tablet (300 mg total) by mouth daily.  Dispense: 90 tablet; Refill: 1    4. Type 2 diabetes mellitus without complication, without long-term current use of insulin (H)  Refill sent to the pharmacy  - metFORMIN (GLUCOPHAGE-XR) 500 MG 24 hr tablet; TAKE 3 TABLETS BY MOUTH ONCE DAILY WITH BREAKFAST  Dispense: 270 tablet; Refill: 1    5. Edema, unspecified type  Continue with frusemide patient is showing no edema  Has some signs of chronic venous stasis  Check kidney function and potassium levels today    6. Anxiety  Patient is doing well on sertraline  He understands he needs to be more active with which would also be beneficial to his mental health  He periodically worries about finances which he states prevents him from getting out and being active  Discussed activities such as going to [place] and walking which would not require money  Continue current dosing of sertraline    7. Class 3 severe obesity with serious comorbidity and body mass index (BMI) of 50.0 to 59.9 in adult, unspecified obesity type (H)  Patient is aware of his weight and the need for weight loss as I saw  him last  Encouraged him to continue work on dietary changes and exercise  Discussed portion control    8. Health care maintenance  Will obtain patient's other fasting blood work today and follow-up based on notes  - Lipid Cascade RANDOM  - Comprehensive Metabolic Panel  - PSA (Prostatic-Specific Antigen), Annual Screen    9. Encounter for screening for malignant neoplasm of prostate   We will check a PSA for prostate cancer screening  - PSA (Prostatic-Specific Antigen), Annual Screen        SUBJECTIVE:   Chief Complaint   Patient presents with     Diabetes     Medication check      Chaparro Guillen 73 y.o. male    Current Outpatient Medications   Medication Sig Dispense Refill     atorvastatin (LIPITOR) 20 MG tablet Take 1 tablet (20 mg total) by mouth daily. 90 tablet 2     blood glucose test (ONETOUCH ULTRA TEST) strips TEST TWICE DAILY. 200 strip 3     blood-glucose meter Misc OneTouch Ultra System w/Device Kit.       furosemide (LASIX) 20 MG tablet Take 1 tablet (20 mg total) by mouth daily. 90 tablet 1     generic lancets (ONETOUCH ULTRASOFT) TEST TWICE DAILY 100 each 2     LANCETS MISC OneTouch UltraSoft Lancets Miscellaneous. Test twice daily.       lisinopril (PRINIVIL,ZESTRIL) 10 MG tablet Take 1 tablet (10 mg total) by mouth daily. 90 tablet 3     sertraline (ZOLOFT) 25 MG tablet Take 1 tablet (25 mg total) by mouth daily. 90 tablet 2     silver sulfADIAZINE (SILVADENE, SSD) 1 % cream Apply 1 application topically 2 (two) times a day as needed.       allopurinol (ZYLOPRIM) 300 MG tablet Take 1 tablet (300 mg total) by mouth daily. 90 tablet 1     hydroCHLOROthiazide (HYDRODIURIL) 25 MG tablet Take 1 tablet (25 mg total) by mouth daily. 90 tablet 1     metFORMIN (GLUCOPHAGE-XR) 500 MG 24 hr tablet TAKE 3 TABLETS BY MOUTH ONCE DAILY WITH BREAKFAST 270 tablet 1     No current facility-administered medications for this visit.      Allergies: Patient has no known allergies.   No LMP for male patient.    HPI:  "  Patient is here for medication follow-up  Patient is a diabetic on metformin A1c returns at 6.2%  Patient is on antihypertensive medications and his blood pressure is at goal range  He takes allopurinol for prevention of gout which is working well  He takes sertraline for anxiety and he feels medication is working well for him  Large part of our discussion today was over his weight and the need, we discussed diet and exercise and he is down a few pounds since I saw him and encouraged him to continue with his current practice  He is been working on portion changes and eating less    Blood pressure is at goal range 1 continue with current dosing  We will be checking kidney function and potassium levels today  Is due for cholesterol check fasting labs and PSA    ROS: negative except as per HPI    OBJECTIVE:   The patient appears well, alert, oriented x 3, in no distress.  /83   Pulse 77   Temp 97.9  F (36.6  C) (Oral)   Resp 16   Ht 5' 8.5\" (1.74 m)   Wt (!) 347 lb 9.6 oz (157.7 kg)   BMI 52.08 kg/m      Lungs: clear, good air entry, no wheezes, rhonchi or rales.   Cardiac: S1 and S2 normal, no murmurs, regular rate and rhythm.   Abdomen: normal bowel sounds, soft, obese BMI 52  Extremities: show no edema, normal peripheral pulses.  Lower extremities showing no edema but chronic venous stasis coloration changes  Neurological: normal, no focal findings.  Skin: clear, dry, no rashes/lesions  Psych- normal mood and affect      Pt states an understanding and agrees to the above plan.    "

## 2021-05-29 NOTE — TELEPHONE ENCOUNTER
----- Message from Ba Miguel MD sent at 6/3/2019 10:22 AM CDT -----  PSA level has decreased from last year, we will continue to check yearly.  Cholesterol levels are at goal range.  I would like the patient to decrease his cholesterol medication in half, to only be taking 10 mg daily.  Kidney function and liver function are normal.  No concerns on blood work.

## 2021-05-30 VITALS — BODY MASS INDEX: 53.95 KG/M2 | WEIGHT: 315 LBS

## 2021-05-30 VITALS — BODY MASS INDEX: 53.3 KG/M2 | WEIGHT: 315 LBS

## 2021-05-30 VITALS — WEIGHT: 315 LBS | BODY MASS INDEX: 53.95 KG/M2

## 2021-05-31 VITALS — WEIGHT: 315 LBS | HEIGHT: 69 IN | BODY MASS INDEX: 46.65 KG/M2

## 2021-05-31 VITALS — BODY MASS INDEX: 46.65 KG/M2 | WEIGHT: 315 LBS | HEIGHT: 69 IN

## 2021-05-31 VITALS — WEIGHT: 313 LBS | BODY MASS INDEX: 46.9 KG/M2

## 2021-05-31 VITALS — BODY MASS INDEX: 56.41 KG/M2 | WEIGHT: 315 LBS

## 2021-06-01 VITALS — BODY MASS INDEX: 46 KG/M2 | WEIGHT: 307 LBS

## 2021-06-01 VITALS — BODY MASS INDEX: 49.15 KG/M2 | WEIGHT: 315 LBS

## 2021-06-01 NOTE — TELEPHONE ENCOUNTER
Medication Request  Medication name:   atorvastatin (LIPITOR) 20 MG tablet 90 tablet 2 6/3/2019  No   Sig - Route: Take 0.5 tablets (10 mg total) by mouth daily. - Oral   Class: No Print       Pharmacy Name and Location: [id number]  Reason for request: refill request received, medication is listed as historical  When did you use medication last?:  11.30.2018  Patient offered appointment:  n/a  Okay to leave a detailed message: yes

## 2021-06-01 NOTE — TELEPHONE ENCOUNTER
Please advise. Rx states take 0.5 tablet daily - qty 90. Please update Rx for qty 45 for 90 day supply or change dose to 10 mg tablets?

## 2021-06-01 NOTE — TELEPHONE ENCOUNTER
FYI - Status Update  Who is Calling: pharmacy  Update: Prescription sent as No Print, please resend  Okay to leave a detailed message?:  Yes

## 2021-06-01 NOTE — TELEPHONE ENCOUNTER
Outpatient Medication Detail      Disp Refills Start End    atorvastatin (LIPITOR) 20 MG tablet 45 tablet 2 9/24/2019     Sig - Route: Take 0.5 tablets (10 mg total) by mouth daily. - Oral    Class: No Print    Associated Diagnoses     Dyslipidemia       Pharmacy     [id number] - [place], [place] - [address] [place] [address]      Resent to pharmacy.     Dominic Fontanez,RN [facility] Triage

## 2021-06-02 VITALS — WEIGHT: 315 LBS | BODY MASS INDEX: 54.09 KG/M2

## 2021-06-02 NOTE — TELEPHONE ENCOUNTER
Refill Approved    Rx renewed per Medication Renewal Policy. Medication was last renewed on .1/25/19    Mariajose Chávez, [facility] Triage/Med Refill 10/22/2019     Requested Prescriptions   Pending Prescriptions Disp Refills     sertraline (ZOLOFT) 25 MG tablet [Pharmacy Med Name: Sertraline HCl Oral Tablet 25 MG] 90 tablet 1     Sig: Take 1 tablet (25 mg total) by mouth daily.       SSRI Refill Protocol  Passed - 10/22/2019  4:24 PM        Passed - PCP or prescribing provider visit in last year     Last office visit with prescriber/PCP: 5/29/2019 Ba Miguel MD OR same dept: 5/29/2019 Ba Miguel MD OR same specialty: 5/29/2019 Ba Miguel MD  Last physical: 12/15/2017 Last MTM visit: Visit date not found   Next visit within 3 mo: Visit date not found  Next physical within 3 mo: Visit date not found  Prescriber OR PCP: Ba Miguel MD  Last diagnosis associated with med order: 1. Anxiety  - sertraline (ZOLOFT) 25 MG tablet [Pharmacy Med Name: Sertraline HCl Oral Tablet 25 MG]; Take 1 tablet (25 mg total) by mouth daily.  Dispense: 90 tablet; Refill: 1    If protocol passes may refill for 12 months if within 3 months of last provider visit (or a total of 15 months).

## 2021-06-02 NOTE — TELEPHONE ENCOUNTER
Medication Question or Clarification  Who is calling: Pharmacy: [facility]  What medication are you calling about? (include dose and sig)   Disp Refills Start End     atorvastatin (LIPITOR) 20 MG tablet 45 tablet 2 10/1/2019     Sig - Route: Take 0.5 tablets (10 mg total) by mouth daily. - Oral    Sent to pharmacy as: atorvastatin 20 mg tablet (LIPITOR)    E-Prescribing Status: Receipt confirmed by pharmacy (10/1/2019  9:40 AM CDT)      Who prescribed the medication?: Ba Miguel MD   What is your question/concern?: Fax stated the patient would like the 10 mg pill. Fax stated patient does not want to cut the pills in half anymore.  [facility]: [facility] #[id number]  Okay to leave a detailed message?: No  Site CMT - Please call the pharmacy to obtain any additional needed information.

## 2021-06-02 NOTE — TELEPHONE ENCOUNTER
Refill Approved    Rx renewed per Medication Renewal Policy. Medication was last renewed on 4/17/19.    Shagufta Choudhary, [facility] Triage/Med Refill 10/11/2019     Requested Prescriptions   Pending Prescriptions Disp Refills     furosemide (LASIX) 20 MG tablet [Pharmacy Med Name: Furosemide Oral Tablet 20 MG] 90 tablet 0     Sig: Take 1 tablet (20 mg) by mouth once a day       Diuretics/Combination Diuretics Refill Protocol  Passed - 10/10/2019  7:02 AM        Passed - Visit with PCP or prescribing provider visit in past 12 months     Last office visit with prescriber/PCP: 5/29/2019 Ba Miguel MD OR same dept: 5/29/2019 Ba Miguel MD OR same specialty: 5/29/2019 Ba Miguel MD  Last physical: 12/15/2017 Last MTM visit: Visit date not found   Next visit within 3 mo: Visit date not found  Next physical within 3 mo: Visit date not found  Prescriber OR PCP: Ba Miguel MD  Last diagnosis associated with med order: 1. HTN (hypertension)  - furosemide (LASIX) 20 MG tablet [Pharmacy Med Name: Furosemide Oral Tablet 20 MG]; Take 1 tablet (20 mg) by mouth once a day  Dispense: 90 tablet; Refill: 0    If protocol passes may refill for 12 months if within 3 months of last provider visit (or a total of 15 months).             Passed - Serum Potassium in past 12 months      Lab Results   Component Value Date    Potassium 3.8 05/29/2019             Passed - Serum Sodium in past 12 months      Lab Results   Component Value Date    Sodium 139 05/29/2019             Passed - Blood pressure on file in past 12 months     BP Readings from Last 1 Encounters:   05/29/19 123/83             Passed - Serum Creatinine in past 12 months      Creatinine   Date Value Ref Range Status   05/29/2019 1.12 0.70 - 1.30 mg/dL Final

## 2021-06-02 NOTE — TELEPHONE ENCOUNTER
Refill Approved    Rx renewed per Medication Renewal Policy. Medication was last renewed on 1/20/17.    Shagufta Choudhary, [facility] Triage/Med Refill 11/1/2019     Requested Prescriptions   Pending Prescriptions Disp Refills     generic lancets (ONETOUCH ULTRASOFT) 100 each 2     Sig: TEST TWICE DAILY       Diabetic Supplies Refill Protocol Passed - 11/1/2019  9:04 AM        Passed - Visit with PCP or prescribing provider visit in last 6 months     Last office visit with prescriber/PCP: 5/29/2019 Ba Miguel MD OR same dept: 5/29/2019 Ba Miguel MD OR same specialty: 5/29/2019 Ba Miguel MD  Last physical: 12/15/2017 Last MTM visit: Visit date not found   Next visit within 3 mo: Visit date not found  Next physical within 3 mo: Visit date not found  Prescriber OR PCP: Ba Miguel MD  Last diagnosis associated with med order: 1. Diabetes (H)  - generic lancets (ONETOUCH ULTRASOFT); TEST TWICE DAILY  Dispense: 100 each; Refill: 2    If protocol passes may refill for 12 months if within 3 months of last provider visit (or a total of 15 months).             Passed - A1C in last 6 months     Hemoglobin A1c   Date Value Ref Range Status   05/29/2019 6.2 (H) 3.5 - 6.0 % Final

## 2021-06-03 VITALS — BODY MASS INDEX: 46.65 KG/M2 | HEIGHT: 69 IN | WEIGHT: 315 LBS

## 2021-06-03 NOTE — PROGRESS NOTES
ASSESSMENT/PLAN  1. Type 2 diabetes mellitus (H)  Hemoglobin A1c at goal range  Continue with current medications  Continue at 6-month interval follow-ups for diabetes  Continue to work on eating healthy and getting regular exercise  - Glycosylated Hemoglobin A1c  - Comprehensive Metabolic Panel  - Lipid Cascade FASTING  - HM2(CBC w/o Differential)    2. Need for Td vaccine  We will update with tetanus today  - Td, Preservative Free (green label)    3. Essential hypertension  Blood pressure goal range continue with current medications check electrolytes and kidney function    4. Mixed hyperlipidemia  Patient is on statin therapy check cholesterol levels-goal of LDL below 100    5. Anxiety  Patient doing well on current dosing of sertraline  Continue with current dosing    6. Class 3 severe obesity with serious comorbidity and body mass index (BMI) of 50.0 to 59.9 in adult, unspecified obesity type (H)  Patient is aware of his weight and the need for weight loss  We discussed diet and exercise and the need to become frequent with this to maintain weight and help with weight loss  To help maintain balance and coordination        SUBJECTIVE:   Chief Complaint   Patient presents with     Diabetes     Fasting labs, A1c due     Chaparro Guillen 74 y.o. male    Current Outpatient Medications   Medication Sig Dispense Refill     allopurinol (ZYLOPRIM) 300 MG tablet Take 1 tablet (300 mg total) by mouth daily. 90 tablet 1     atorvastatin (LIPITOR) 10 MG tablet Take 1 tablet (10 mg total) by mouth daily. 90 tablet 3     atorvastatin (LIPITOR) 20 MG tablet Take 0.5 tablets (10 mg total) by mouth daily. 45 tablet 2     blood glucose test (ONETOUCH ULTRA TEST) strips TEST TWICE DAILY. 200 strip 3     blood-glucose meter Misc OneTouch Ultra System w/Device Kit.       furosemide (LASIX) 20 MG tablet Take 1 tablet (20 mg) by mouth once a day 90 tablet 2     generic lancets (ONETOUCH ULTRASOFT) TEST TWICE DAILY 200 each 3      hydroCHLOROthiazide (HYDRODIURIL) 25 MG tablet Take 1 tablet (25 mg total) by mouth daily. 90 tablet 1     LANCETS MISC OneTouch UltraSoft Lancets Miscellaneous. Test twice daily.       lisinopril (PRINIVIL,ZESTRIL) 10 MG tablet Take 1 tablet (10 mg total) by mouth daily. 90 tablet 3     metFORMIN (GLUCOPHAGE-XR) 500 MG 24 hr tablet TAKE 3 TABLETS BY MOUTH ONCE DAILY WITH BREAKFAST 270 tablet 1     sertraline (ZOLOFT) 25 MG tablet Take 1 tablet (25 mg total) by mouth daily. 90 tablet 0     silver sulfADIAZINE (SILVADENE, SSD) 1 % cream Apply 1 application topically 2 (two) times a day as needed.       No current facility-administered medications for this visit.      Allergies: Patient has no known allergies.   No LMP for male patient.    HPI:   Patient is here for follow-up regards to diabetes and other medical conditions.  His A1c returns at goal range we will continue with current medication-follow-up at 6-month intervals.  Patient is on statin therapy we will check cholesterol levels today.  Patient has not been having any gout flareups and will continue with allopurinol.  Patient is on sertraline for anxiety and feels the medication is working well.  Continue with current dosing.  Patient is aware of his weight and we had a discussion on diet and exercise and the need for weight loss.  Patient be updated with tetanus today  Patient's blood pressure is at goal range continue with current medications.    ROS: negative except as per HPI    OBJECTIVE:   The patient appears well, alert, oriented x 3, in no distress.  /68 (Patient Site: Right Arm, Patient Position: Sitting, Cuff Size: Thigh)   Pulse 75   Temp 97.6  F (36.4  C) (Oral)   Resp 18   Wt (!) 353 lb (160.1 kg)   BMI 52.89 kg/m      Lungs: clear, good air entry, no wheezes, rhonchi or rales.   Cardiac: S1 and S2 normal, no murmurs, regular rate and rhythm.   Abdomen: normal bowel sounds, soft, BMI 52  Extremities: show no edema, normal peripheral  pulses.   Neurological: normal, no focal findings.  Skin: clear, dry, no rashes/lesions  Psych- normal mood and affect      Pt states an understanding and agrees to the above plan.  Greater than 25 minutes was spent today in interview and examination with Chaparro Guillen with more than 50% of that time in counseling and coordination of care.

## 2021-06-04 VITALS
BODY MASS INDEX: 52.89 KG/M2 | WEIGHT: 315 LBS | HEART RATE: 75 BPM | SYSTOLIC BLOOD PRESSURE: 119 MMHG | DIASTOLIC BLOOD PRESSURE: 68 MMHG | RESPIRATION RATE: 18 BRPM | TEMPERATURE: 97.6 F

## 2021-06-04 VITALS
SYSTOLIC BLOOD PRESSURE: 115 MMHG | RESPIRATION RATE: 16 BRPM | HEIGHT: 69 IN | WEIGHT: 315 LBS | BODY MASS INDEX: 46.65 KG/M2 | TEMPERATURE: 97 F | DIASTOLIC BLOOD PRESSURE: 72 MMHG | HEART RATE: 76 BPM

## 2021-06-04 NOTE — TELEPHONE ENCOUNTER
----- Message from Ba Miguel MD sent at 12/26/2019  2:07 PM CST -----  Please inform patient that kidney function is stable- continue to keep well hydrated and we will monitor.

## 2021-06-04 NOTE — TELEPHONE ENCOUNTER
----- Message from Ba Miguel MD sent at 12/9/2019  7:31 AM CST -----  Liver function is normal.  Kidney function is slightly decreased, I would recommend keeping well-hydrated and rechecking levels in 2 weeks.  Cholesterol levels are at goal range.  No other concerns on blood work.

## 2021-06-04 NOTE — TELEPHONE ENCOUNTER
Reason contacted:  Results  Information relayed:  Informed patient of message below. Patient states understanding.  Additional questions:  No  Further follow-up needed:  No  Okay to leave a detailed message:  No     Letter mailed

## 2021-06-05 VITALS
WEIGHT: 315 LBS | RESPIRATION RATE: 18 BRPM | SYSTOLIC BLOOD PRESSURE: 126 MMHG | DIASTOLIC BLOOD PRESSURE: 80 MMHG | BODY MASS INDEX: 49 KG/M2 | HEART RATE: 66 BPM

## 2021-06-05 VITALS — BODY MASS INDEX: 49.3 KG/M2 | WEIGHT: 315 LBS | TEMPERATURE: 96.8 F

## 2021-06-05 NOTE — TELEPHONE ENCOUNTER
Refill Approved    Rx renewed per Medication Renewal Policy. Medication was last renewed on 5/29/19.    Shagufta Choudhary, [facility] Triage/Med Refill 1/27/2020     Requested Prescriptions   Pending Prescriptions Disp Refills     allopurinoL (ZYLOPRIM) 300 MG tablet 90 tablet 1     Sig: Take 1 tablet (300 mg total) by mouth daily.       Allopurinol/Febuxostat Refill Protocol  Passed - 1/27/2020  1:23 PM        Passed - LFT or AST or ALT in last 12 months     Albumin   Date Value Ref Range Status   12/03/2019 4.0 3.5 - 5.0 g/dL Final     Bilirubin, Total   Date Value Ref Range Status   12/03/2019 0.7 0.0 - 1.0 mg/dL Final     Alkaline Phosphatase   Date Value Ref Range Status   12/03/2019 65 45 - 120 U/L Final     AST   Date Value Ref Range Status   12/03/2019 18 0 - 40 U/L Final     ALT   Date Value Ref Range Status   12/03/2019 22 0 - 45 U/L Final     Protein, Total   Date Value Ref Range Status   12/03/2019 7.6 6.0 - 8.0 g/dL Final                Passed - Visit with PCP or prescribing provider visit in past 12 months     Last office visit with prescriber/PCP: 12/3/2019 Ba Miguel MD OR same dept: 12/3/2019 Ba Miguel MD OR same specialty: 12/3/2019 Ba Miguel MD  Last physical: 12/15/2017 Last MTM visit: Visit date not found   Next visit within 3 mo: Visit date not found  Next physical within 3 mo: Visit date not found  Prescriber OR PCP: Ba Miguel MD  Last diagnosis associated with med order: 1. Medication management  - allopurinoL (ZYLOPRIM) 300 MG tablet; Take 1 tablet (300 mg total) by mouth daily.  Dispense: 90 tablet; Refill: 1    If protocol passes may refill for 12 months if within 3 months of last provider visit (or a total of 15 months).

## 2021-06-05 NOTE — TELEPHONE ENCOUNTER
Refill Approved    Rx renewed per Medication Renewal Policy. Medication was last renewed on 5/29/19.10/22/19.    Shagufta Choudhary, [facility] Triage/Med Refill 1/22/2020     Requested Prescriptions   Pending Prescriptions Disp Refills     metFORMIN (GLUCOPHAGE-XR) 500 MG 24 hr tablet 270 tablet 1     Sig: TAKE 3 TABLETS BY MOUTH ONCE DAILY WITH BREAKFAST       Metformin Refill Protocol Passed - 1/22/2020  2:51 PM        Passed - Blood pressure in last 12 months     BP Readings from Last 1 Encounters:   12/03/19 119/68             Passed - LFT or AST or ALT in last 12 months     Albumin   Date Value Ref Range Status   12/03/2019 4.0 3.5 - 5.0 g/dL Final     Bilirubin, Total   Date Value Ref Range Status   12/03/2019 0.7 0.0 - 1.0 mg/dL Final     Alkaline Phosphatase   Date Value Ref Range Status   12/03/2019 65 45 - 120 U/L Final     AST   Date Value Ref Range Status   12/03/2019 18 0 - 40 U/L Final     ALT   Date Value Ref Range Status   12/03/2019 22 0 - 45 U/L Final     Protein, Total   Date Value Ref Range Status   12/03/2019 7.6 6.0 - 8.0 g/dL Final                Passed - GFR or Serum Creatinine in last 6 months     GFR MDRD Non Af Amer   Date Value Ref Range Status   12/23/2019 55 (L) >60 mL/min/1.73m2 Final     GFR MDRD Af Amer   Date Value Ref Range Status   12/23/2019 >60 >60 mL/min/1.73m2 Final             Passed - Visit with PCP or prescribing provider visit in last 6 months or next 3 months     Last office visit with prescriber/PCP: 12/3/2019 OR same dept: 12/3/2019 Ba Miguel MD OR same specialty: 12/3/2019 Ba Miguel MD Last physical: Visit date not found Last MTM visit: Visit date not found         Next appt within 3 mo: Visit date not found  Next physical within 3 mo: Visit date not found  Prescriber OR PCP: Ba Miguel MD  Last diagnosis associated with med order: 1. Type 2 diabetes mellitus without complication, without long-term current use of insulin (H)  -  metFORMIN (GLUCOPHAGE-XR) 500 MG 24 hr tablet; TAKE 3 TABLETS BY MOUTH ONCE DAILY WITH BREAKFAST  Dispense: 270 tablet; Refill: 1    2. Anxiety  - sertraline (ZOLOFT) 25 MG tablet; Take 1 tablet (25 mg total) by mouth daily.  Dispense: 90 tablet; Refill: 0     If protocol passes may refill for 12 months if within 3 months of last provider visit (or a total of 15 months).           Passed - A1C in last 6 months     Hemoglobin A1c   Date Value Ref Range Status   12/03/2019 6.4 (H) 3.5 - 6.0 % Final               Passed - Microalbumin in last year      Microalbumin, Random Urine   Date Value Ref Range Status   05/29/2019 <0.50 0.00 - 1.99 mg/dL Final                  sertraline (ZOLOFT) 25 MG tablet 90 tablet 0     Sig: Take 1 tablet (25 mg total) by mouth daily.       SSRI Refill Protocol  Passed - 1/22/2020  2:51 PM        Passed - PCP or prescribing provider visit in last year     Last office visit with prescriber/PCP: 12/3/2019 Ba Miguel MD OR same dept: 12/3/2019 Ba Miguel MD OR same specialty: 12/3/2019 Ba Miguel MD  Last physical: 12/15/2017 Last MTM visit: Visit date not found   Next visit within 3 mo: Visit date not found  Next physical within 3 mo: Visit date not found  Prescriber OR PCP: Ba Miguel MD  Last diagnosis associated with med order: 1. Type 2 diabetes mellitus without complication, without long-term current use of insulin (H)  - metFORMIN (GLUCOPHAGE-XR) 500 MG 24 hr tablet; TAKE 3 TABLETS BY MOUTH ONCE DAILY WITH BREAKFAST  Dispense: 270 tablet; Refill: 1    2. Anxiety  - sertraline (ZOLOFT) 25 MG tablet; Take 1 tablet (25 mg total) by mouth daily.  Dispense: 90 tablet; Refill: 0    If protocol passes may refill for 12 months if within 3 months of last provider visit (or a total of 15 months).

## 2021-06-05 NOTE — TELEPHONE ENCOUNTER
Refill Approved    Rx renewed per Medication Renewal Policy. Medication was last renewed on 1/23/19.    Mariajose Chávez, [facility] Connection Triage/Med Refill 1/6/2020     Requested Prescriptions   Pending Prescriptions Disp Refills     lisinopril (PRINIVIL,ZESTRIL) 10 MG tablet [Pharmacy Med Name: Lisinopril Oral Tablet 10 MG] 90 tablet 2     Sig: Take 1 tablet (10 mg) by mouth once a day       Ace Inhibitors Refill Protocol Passed - 1/6/2020  7:01 AM        Passed - PCP or prescribing provider visit in past 12 months       Last office visit with prescriber/PCP: 12/3/2019 Ba Miguel MD OR same dept: 12/3/2019 Ba Miguel MD OR same specialty: 12/3/2019 Ba Miguel MD  Last physical: 12/15/2017 Last MTM visit: Visit date not found   Next visit within 3 mo: Visit date not found  Next physical within 3 mo: Visit date not found  Prescriber OR PCP: Ba Miguel MD  Last diagnosis associated with med order: 1. HTN (hypertension)  - lisinopril (PRINIVIL,ZESTRIL) 10 MG tablet [Pharmacy Med Name: Lisinopril Oral Tablet 10 MG]; Take 1 tablet (10 mg) by mouth once a day  Dispense: 90 tablet; Refill: 2    If protocol passes may refill for 12 months if within 3 months of last provider visit (or a total of 15 months).             Passed - Serum Potassium in past 12 months     Lab Results   Component Value Date    Potassium 3.8 12/03/2019             Passed - Blood pressure filed in past 12 months     BP Readings from Last 1 Encounters:   12/03/19 119/68             Passed - Serum Creatinine in past 12 months     Creatinine   Date Value Ref Range Status   12/23/2019 1.29 0.70 - 1.30 mg/dL Final

## 2021-06-05 NOTE — TELEPHONE ENCOUNTER
Refill Approved    Rx renewed per Medication Renewal Policy. Medication was last renewed on 5/29/19.    Shagufta Choudhary, [facility] Triage/Med Refill 1/7/2020     Requested Prescriptions   Pending Prescriptions Disp Refills     hydroCHLOROthiazide (HYDRODIURIL) 25 MG tablet 90 tablet 1     Sig: Take 1 tablet (25 mg total) by mouth daily.       Diuretics/Combination Diuretics Refill Protocol  Passed - 1/7/2020  7:17 AM        Passed - Visit with PCP or prescribing provider visit in past 12 months     Last office visit with prescriber/PCP: 12/3/2019 Ba Miguel MD OR same dept: 12/3/2019 Ba Miguel MD OR same specialty: 12/3/2019 Ba Miguel MD  Last physical: 12/15/2017 Last MTM visit: Visit date not found   Next visit within 3 mo: Visit date not found  Next physical within 3 mo: Visit date not found  Prescriber OR PCP: Ba Miguel MD  Last diagnosis associated with med order: 1. Essential hypertension  - hydroCHLOROthiazide (HYDRODIURIL) 25 MG tablet; Take 1 tablet (25 mg total) by mouth daily.  Dispense: 90 tablet; Refill: 1    If protocol passes may refill for 12 months if within 3 months of last provider visit (or a total of 15 months).             Passed - Serum Potassium in past 12 months      Lab Results   Component Value Date    Potassium 3.8 12/03/2019             Passed - Serum Sodium in past 12 months      Lab Results   Component Value Date    Sodium 138 12/03/2019             Passed - Blood pressure on file in past 12 months     BP Readings from Last 1 Encounters:   12/03/19 119/68             Passed - Serum Creatinine in past 12 months      Creatinine   Date Value Ref Range Status   12/23/2019 1.29 0.70 - 1.30 mg/dL Final

## 2021-06-08 NOTE — PROGRESS NOTES
ASSESSMENT & PLAN:    1. Diabetes mellitus  Hemoglobin A1c at goal range  Patient is aware that 500 mg of metformin twice daily has not been the ideal level with metformin in the past however due to renal insufficiency and good control with hemoglobin A1c    Continue with 500 twice daily at this time  Recommend follow-up in 3 months with pharmacist here at clinic and again with me 6 months from now sooner if needed  Continue work on weight loss  - Glycosylated Hemoglobin A1c  - Comprehensive Metabolic Panel  - Lipid Cascade  - HM2(CBC w/o Differential)  - PSA (Prostatic-Specific Antigen), Annual Screen    2. Health care maintenance  We will obtain fasting labs today and follow-up based on results  - Comprehensive Metabolic Panel  - Lipid Cascade  - HM2(CBC w/o Differential)  - PSA (Prostatic-Specific Antigen), Annual Screen    3. Morbid obesity, unspecified obesity type  Discussed need for weight loss  Discussed diet and exercise    4. Edema  We will try to gentle diuresis with patient's well known history of renal insufficiency  We will increase frusemide 20 mg every other day to help improve with the lower extremity edema  Continue to monitor open lesion  Follow-up immediately if any signs of infection or abrupt    5. Mixed hyperlipidemia  Check cholesterol levels today and follow-up based on results  Work on weight loss    6. Renal insufficiency  Patient's renal insufficiency is returned back to baseline we will check again today and again when he follows up with pharmacy in 3 months      No change in medications today outside the Lasix will be increased every other day    There are no Patient Instructions on file for this visit.    Orders Placed This Encounter   Procedures     Glycosylated Hemoglobin A1c     Comprehensive Metabolic Panel     Lipid Cascade     Order Specific Question:   Fasting is required?     Answer:   Yes     HM2(CBC w/o Differential)     PSA (Prostatic-Specific Antigen), Annual Screen      There are no discontinued medications.    No Follow-up on file.    CHIEF COMPLAINT:  Chief Complaint   Patient presents with     Diabetes     Medication check - fasting labs      Hypertension     Hyperlipidemia       HISTORY OF PRESENT ILLNESS:  Chaparro is a 71 y.o. male presenting to the clinic today for a medication check.     Diabetes: His last hemoglobin A1c was 7.0% on 11/08/2016. His hemoglobin A1c is 6.8% today. He thinks his diabetes has been under good control. He is taking 500 mg of metformin twice daily. He still has good sensation in his feet and denies tingling.     Edema: He had to decrease the amount of furosemide that he was taking because of kidney irritation. Lately, he has been taking 20 mg every three days. He has a small amount of swelling in his legs and has been developing some blisters on his shins that drain fluid. He has been putting a bandage on them. He will bump up his dose to 20 mg of furosemide every other day. He inquires if the discoloration of his legs will ever return to normal. Limiting salt intake was encouraged.     Hypertension: His blood pressure is in a good range today. He is taking 37.5-25 mg of triamterene-hydrochlorothiazide and 10 mg of lisinopril daily.     Hyperlipidemia: He continues to take 20 mg of pravastatin and 160 mg of fenofibrate daily.     REVIEW OF SYSTEMS:   He has had some kidney irritation in the past. His creatinine was as high as 1.44 mg/dL in November 2016, but it is back down to 1.15 mg/dL as of last month. He is fasting today. He takes Aleve when he has headaches and is not taking it every day. All other systems are negative.    PFSH:  He admits that he likes to put salt on his food.     TOBACCO USE:  History   Smoking Status     Never Smoker   Smokeless Tobacco     Not on file       VITALS:  Vitals:    02/09/17 0812   BP: 126/78   Patient Site: Left Arm   Patient Position: Sitting   Cuff Size: Adult Large   Pulse: 70   Resp: 20   Temp: 98  F  (36.7  C)   TempSrc: Oral   Weight: (!) 376 lb (170.6 kg)     Wt Readings from Last 3 Encounters:   02/09/17 (!) 376 lb (170.6 kg)   01/20/17 (!) 376 lb (170.6 kg)   12/16/16 (!) 379 lb (171.9 kg)       QUALITY MEASURES:  The following high BMI interventions were performed this visit: encouragement to exercise and weight monitoring    PHYSICAL EXAM:  GENERAL APPEARANCE: Alert, cooperative, no distress, appears stated age   LUNGS: Clear to auscultation bilaterally, respirations unlabored .  HEART: Regular rate and rhythm, S1 and S2 normal, no murmur, rub, or gallop.  Abd: soft, obese  EXTREMITIES: Trace edema of lower extremities bilaterally. Open lesion on anterior right shin with a edematous blister below- no signs of infection-  NEUROLOGIC: No gross focal deficits  PSYCHOLOGIC: Normal mood and affect      ADDITIONAL HISTORY SUMMARIZED (FROM OLD RECORDS OR HISTORY FROM SOMEONE OTHER THAN THE PATIENT OR ANOTHER HEALTHCARE PROVIDER) (2 TOTAL): reviewed DM eye exam note    DECISION TO OBTAIN EXTRA INFORMATION (OLD RECORDS REQUESTED OR HISTORY FROM ANOTHER PERSON OR ACCESSING CARE EVERYWHERE) (1 TOTAL): None.     RADIOLOGY TESTS SUMMARIZED OR ORDERED (XRAY/CT/MRI/DXA) (1 TOTAL): None.    LABS REVIEWED OR ORDERED (1 TOTAL): Past labs reviewed. Labs ordered.     MEDICINE TESTS SUMMARIZED OR ORDERED (EKG/ECHO) (1 TOTAL): None.    INDEPENDENT REVIEW OF EKG OR X-RAY (2 EACH): None.     The visit lasted a total of 18 minutes face to face with the patient. Over 50% of the time was spent counseling and educating the patient about his medications.    IBarry, am scribing for and in the presence of, Dr. Miguel.    I, Dr. Miguel, personally performed the services described in this documentation, as scribed by Barry Underwood in my presence, and it is both accurate and complete.    MEDICATIONS:  Current Outpatient Prescriptions   Medication Sig Dispense Refill     allopurinol (ZYLOPRIM) 300 MG tablet TAKE 1 TABLET BY  MOUTH DAILY 90 tablet 3     aspirin 81 mg chewable tablet Chew 81 mg daily.       blood glucose test (ONETOUCH ULTRA TEST) strips TEST TWICE DAILY. 100 strip 6     blood-glucose meter Misc OneTouch Ultra System w/Device Kit.       fenofibrate (TRIGLIDE) 160 MG tablet TAKE 1 TABLET (160 MG) BY MOUTH ONCE DAILY 90 tablet 0     furosemide (LASIX) 20 MG tablet Take 1 tablet (20 mg total) by mouth daily. 90 tablet 3     generic lancets (ONETOUCH ULTRASOFT) TEST TWICE DAILY 100 each 2     LANCETS MISC OneTouch UltraSoft Lancets Miscellaneous. Test twice daily.       lisinopril (PRINIVIL,ZESTRIL) 10 MG tablet TAKE 1 TABLET (10 MG) BY MOUTH ONCE DAILY 90 tablet 3     metFORMIN (GLUCOPHAGE) 500 MG tablet TAKE 1 TABLET BY MOUTH 2  TIMES A DAY WITH MEALS. 180 tablet 0     naproxen sodium (ALEVE) 220 MG tablet Take 220 mg by mouth every 12 (twelve) hours as needed for pain.       pravastatin (PRAVACHOL) 20 MG tablet TAKE 1 TABLET (20 MG) BY MOUTH ONCE A DAY AT BEDTIME. 90 tablet 2     triamterene-hydrochlorothiazide (DYAZIDE) 37.5-25 mg per capsule TAKE 1 CAPSULE BY MOUTH ONCE DAILY 90 capsule 1     No current facility-administered medications for this visit.        Total Data Points: 3

## 2021-06-09 NOTE — TELEPHONE ENCOUNTER
Refill Approved    Rx renewed per Medication Renewal Policy. Medication was last renewed on 10/11/19.    Shagufta Choudhary, [facility] Triage/Med Refill 6/30/2020     Requested Prescriptions   Pending Prescriptions Disp Refills     furosemide (LASIX) 20 MG tablet [Pharmacy Med Name: Furosemide Oral Tablet 20 MG] 90 tablet 0     Sig: Take 1 tablet (20 mg) by mouth once a day       Diuretics/Combination Diuretics Refill Protocol  Passed - 6/29/2020  7:01 AM        Passed - Visit with PCP or prescribing provider visit in past 12 months     Last office visit with prescriber/PCP: 12/3/2019 Ba Miguel MD OR same dept: 12/3/2019 Ba Miguel MD OR same specialty: 12/3/2019 Ba Miguel MD  Last physical: 12/15/2017 Last MTM visit: Visit date not found   Next visit within 3 mo: Visit date not found  Next physical within 3 mo: Visit date not found  Prescriber OR PCP: Ba Miguel MD  Last diagnosis associated with med order: 1. HTN (hypertension)  - furosemide (LASIX) 20 MG tablet [Pharmacy Med Name: Furosemide Oral Tablet 20 MG]; Take 1 tablet (20 mg) by mouth once a day  Dispense: 90 tablet; Refill: 0    If protocol passes may refill for 12 months if within 3 months of last provider visit (or a total of 15 months).             Passed - Serum Potassium in past 12 months      Lab Results   Component Value Date    Potassium 3.8 12/03/2019             Passed - Serum Sodium in past 12 months      Lab Results   Component Value Date    Sodium 138 12/03/2019             Passed - Blood pressure on file in past 12 months     BP Readings from Last 1 Encounters:   12/03/19 119/68             Passed - Serum Creatinine in past 12 months      Creatinine   Date Value Ref Range Status   12/23/2019 1.29 0.70 - 1.30 mg/dL Final

## 2021-06-09 NOTE — TELEPHONE ENCOUNTER
RN cannot approve Refill Request    RN can NOT refill this medication Protocol failed and NO refill given. Last office visit: 12/3/2019 Ba Miguel MD Last Physical: 12/15/2017 Last MTM visit: Visit date not found Last visit same specialty: 12/3/2019 Ba Miguel MD.  Next visit within 3 mo: Visit date not found  Next physical within 3 mo: Visit date not found      Shagufta Choudhary, [facility] [place] Connection Triage/Med Refill 7/15/2020    Requested Prescriptions   Pending Prescriptions Disp Refills     metFORMIN (GLUCOPHAGE-XR) 500 MG 24 hr tablet [Pharmacy Med Name: metFORMIN HCl ER Oral Tablet Extended Release 24 Hour 500 MG] 270 tablet 3     Sig: TAKE 3 TABLETS BY MOUTH ONCE DAILY WITH BREAKFAST       Metformin Refill Protocol Failed - 7/15/2020  7:02 AM        Failed - Visit with PCP or prescribing provider visit in last 6 months or next 3 months     Last office visit with prescriber/PCP: Visit date not found OR same dept: 12/3/2019 Ba Miguel MD OR same specialty: 12/3/2019 Ba Miguel MD Last physical: Visit date not found Last MTM visit: Visit date not found         Next appt within 3 mo: Visit date not found  Next physical within 3 mo: Visit date not found  Prescriber OR PCP: Ba Miguel MD  Last diagnosis associated with med order: 1. Type 2 diabetes mellitus without complication, without long-term current use of insulin (H)  - metFORMIN (GLUCOPHAGE-XR) 500 MG 24 hr tablet [Pharmacy Med Name: metFORMIN HCl ER Oral Tablet Extended Release 24 Hour 500 MG]; TAKE 3 TABLETS BY MOUTH ONCE DAILY WITH BREAKFAST  Dispense: 270 tablet; Refill: 0     If protocol passes may refill for 12 months if within 3 months of last provider visit (or a total of 15 months).           Failed - A1C in last 6 months     Hemoglobin A1c   Date Value Ref Range Status   12/03/2019 6.4 (H) 3.5 - 6.0 % Final               Failed - Microalbumin in last year      Microalbumin, Random Urine   Date  Value Ref Range Status   05/29/2019 <0.50 0.00 - 1.99 mg/dL Final                  Passed - Blood pressure in last 12 months     BP Readings from Last 1 Encounters:   12/03/19 119/68             Passed - LFT or AST or ALT in last 12 months     Albumin   Date Value Ref Range Status   12/03/2019 4.0 3.5 - 5.0 g/dL Final     Bilirubin, Total   Date Value Ref Range Status   12/03/2019 0.7 0.0 - 1.0 mg/dL Final     Alkaline Phosphatase   Date Value Ref Range Status   12/03/2019 65 45 - 120 U/L Final     AST   Date Value Ref Range Status   12/03/2019 18 0 - 40 U/L Final     ALT   Date Value Ref Range Status   12/03/2019 22 0 - 45 U/L Final     Protein, Total   Date Value Ref Range Status   12/03/2019 7.6 6.0 - 8.0 g/dL Final                Passed - GFR or Serum Creatinine in last 6 months     GFR MDRD Non Af Amer   Date Value Ref Range Status   12/23/2019 55 (L) >60 mL/min/1.73m2 Final     GFR MDRD Af Amer   Date Value Ref Range Status   12/23/2019 >60 >60 mL/min/1.73m2 Final

## 2021-06-10 NOTE — TELEPHONE ENCOUNTER
Fax received pt insurance will only cover accu-checkguide meter and accu-check multi clix lancets. Please review and sign.

## 2021-06-10 NOTE — TELEPHONE ENCOUNTER
Reason contacted:  Rx clarification  Information relayed:  Relay message below to pharmacy.   Additional questions:  No  Further follow-up needed:  No  Okay to leave a detailed message:  No

## 2021-06-10 NOTE — PROGRESS NOTES
MTM Encounter    Assessment/Plan  Diabetes: Controlled to goal of 7%.  We are switching Chaparro's metformin to extended release so that he can take all of his medications in the morning.  Increasing the dose slightly for an elevated A1c of 7.3% today.  Needs a foot exam, up to date on other diabetes maintenance.  On appropriate statin and aspirin therapy.  - A1c  - stop metformin  - start metformin ER 500mg 3 tabs QAM  - foot exam at next appointment    Dyslipidemia: Very well controlled on a combination of pravastatin and fenofibrate.  This combination may be unnecessarily aggressive however.  I think jose could do very well on a moderate dose of atorvastatin in place of both of these medications.  This would reduce his medication cost as fenofibrate is his most expensive medication and would reduce his pill count by 1 and allow him to take all of his medications once a day in the morning.  I think the convenience benefits of the switch out with the modest increase in triglycerides we will see.    -Stop pravastatin  -Stop fenofibrate  -Start atorvastatin 20 mg once daily in the morning    Hypertension: Very well controlled to goal of less than 140/90.  He also notes improvement in his edema with the addition of furosemide.  We will check his serum creatinine and electrolytes today.  I told him to continue to monitor the dizziness, if it continues to be an issue or worsens he should let us know right away.  We may need to consider scaling back on his antihypertensives a little bit.  -BMP    Follow Up  3 months with PCP, 6 months with MTM    Subjective/Objective  Chaparro Guillen is a 71 y.o. male here for a medication therapy management (MTM) appointment; his chief concern today is initial medication review and diabetes.    Diabetes: Chaparro is taking metformin 500mg BID.    Last A1c: 7.1% on 2/9/17  Last microalbumin/creatinine: wnl July '16  Last diabetic eye exam: Aug '16  Last diabetic foot exam: did not  discuss  On aspirin: Yes  On statin: low intensity  Lifestyle interventions:weight loss from baseline weight    Hypertension: Taking furosemide every other day, Dyazide every morning and lisinopril in the morning.  He is occasionally feeling a little dizzy when he closes his eyes.  This is happened once or twice in the shower when he is washing his hair.  He also has limited ability to do things like walking up a set of bleachers.    Dyslipidemia: Taking pravastatin in the evening.  All his medications are $15 except his fenofibrate which is $36.  Lab Results   Component Value Date    CHOL 114 02/09/2017    CHOL 118 04/19/2016    CHOL 115 07/14/2015     Lab Results   Component Value Date    HDL 43 02/09/2017    HDL 42 04/19/2016    HDL 39 (L) 07/14/2015     Lab Results   Component Value Date    LDLCALC 50 02/09/2017    LDLCALC 52 04/19/2016    LDLCALC 54 07/14/2015     Lab Results   Component Value Date    TRIG 104 02/09/2017    TRIG 118 04/19/2016    TRIG 112 07/14/2015     No components found for: CHOLHDL     Gout: On allopurinol 300mg.  Tolerating well.    ----------------    Chaparro's medication list was reviewed with them, discussing reason for use, directions for use, and potential side effects of each medication as needed. Indication, safety, efficacy, and convenience was assessed for all medications addressed above.  No environmental factors were noted currently affecting patient.  This care plan was communicated via EMR with his primary care provider, Ba Miguel MD, who is the authorizing prescriber for this visit.  Direct supervision was available by either the patient's PCP or other available provider.  Time and complexity billing metrics are included in the docflowsheet linked to this visit.  Time spent: 35 minutes      Steven Price, PharmD  [facility] Pharmacist  [place], [facility]  [phone]

## 2021-06-10 NOTE — TELEPHONE ENCOUNTER
Medication Question or Clarification  Who is calling: [facility]  What medication are you calling about (include dose and sig)?:   silver sulfADIAZINE (SILVADENE, SSD) 1 % cream  50 g  0  8/21/2020      Sig - Route: Apply 1 application topically 2 (two) times a day as needed. - Topical        Who prescribed the medication?: Ba Miguel MD  What is your question/concern?: Questioning where the cream should be applied, and how long 50 grams should last patient.  Requested Pharmacy: [facility]  Okay to leave a detailed message?: No

## 2021-06-10 NOTE — TELEPHONE ENCOUNTER
----- Message from Ba Miguel MD sent at 8/22/2020 11:17 AM CDT -----  Please inform patient that PSA is stable- we will monitor yearly.  Kidney and liver function are normal.Cholesterol levels are at goal range.  No concerns on blood work.

## 2021-06-10 NOTE — PROGRESS NOTES
ASSESSMENT/PLAN  1. Type 2 diabetes mellitus (H)  A1c returns at goal range  Continue with current medications  Recommend follow-up in 6 months  Continue to work on healthy eating and getting exercise   - Glycosylated Hemoglobin A1c  - Microalbumin, Random Urine  - Comprehensive Metabolic Panel  - Lipid Cascade FASTING  - silver sulfADIAZINE (SILVADENE, SSD) 1 % cream; Apply 1 application topically 2 (two) times a day as needed.  Dispense: 50 g; Refill: 0    2. Essential hypertension  blood pressure at goal range we will check kidney function electrolytes    3. Mixed hyperlipidemia  Patient is on statin therapy will check cholesterol levels  Goal LDL below 100 with his history of diabetes    4. Screening for prostate cancer  Patient status post prostate surgery will continue to monitor PSA  - PSA, Annual Screen (Prostatic-Specific Antigen)        SUBJECTIVE:   Chief Complaint   Patient presents with     Diabetes     Fasting labs, A1c and mircoalbumin due,  no concerns      Chaparro Guillen 74 y.o. male    Current Outpatient Medications   Medication Sig Dispense Refill     allopurinoL (ZYLOPRIM) 300 MG tablet Take 1 tablet (300 mg total) by mouth daily. 90 tablet 3     atorvastatin (LIPITOR) 10 MG tablet Take 1 tablet (10 mg total) by mouth daily. 90 tablet 3     blood glucose test (ONETOUCH ULTRA TEST) strips TEST TWICE DAILY. 200 strip 3     blood glucose test strips Use 1 each As Directed as needed. Dispense brand per patient's insurance at pharmacy discretion. 200 strip 1     blood-glucose meter Misc OneTouch Ultra System w/Device Kit.       furosemide (LASIX) 20 MG tablet Take 1 tablet (20 mg) by mouth once a day 90 tablet 1     generic lancets (ACCU-CHEK MULTICLIX LANCET) Use 1 each As Directed as needed. Dispense brand per patient's insurance at pharmacy discretion. Accu-check fastclix. 102 each 3     generic lancets (ONETOUCH ULTRASOFT) TEST TWICE DAILY 200 each 3     hydroCHLOROthiazide (HYDRODIURIL) 25 MG  "tablet Take 1 tablet (25 mg total) by mouth daily. 90 tablet 3     LANCETS MISC OneTouch UltraSoft Lancets Miscellaneous. Test twice daily.       latanoprost (XALATAN) 0.005 % ophthalmic solution instill 1 drop in to both eyes by Ophthalmic route every evening       lisinopril (PRINIVIL,ZESTRIL) 10 MG tablet Take 1 tablet (10 mg) by mouth once a day 90 tablet 3     metFORMIN (GLUCOPHAGE-XR) 500 MG 24 hr tablet TAKE 3 TABLETS BY MOUTH ONCE DAILY WITH BREAKFAST 270 tablet 3     sertraline (ZOLOFT) 25 MG tablet Take 1 tablet (25 mg total) by mouth daily. 90 tablet 3     silver sulfADIAZINE (SILVADENE, SSD) 1 % cream Apply 1 application topically 2 (two) times a day as needed. 50 g 0     No current facility-administered medications for this visit.      Allergies: Patient has no known allergies.   No LMP for male patient.    HPI:   Patient is here for medication check, he has no acute concerns.  He is a diabetic and his A1c returns at goal range.  We will continue with current diabetic medications and follow-up at 6-month intervals.  Blood pressure is at goal range will continue with current medications.  Check cholesterol levels today LDL goal below 100 with his history of diabetes.  Patient has been successful with about 20 pound weight loss since we last saw him which I congratulated him upon continue to work on diet and exercise.  Patient is status post prostate surgery we will continue to monitor PSA levels.    ROS: negative except as per HPI    OBJECTIVE:   The patient appears well, alert, oriented x 3, in no distress.  /72 (Patient Site: Left Arm, Patient Position: Sitting, Cuff Size: Adult Large)   Pulse 76   Temp 97  F (36.1  C) (Oral)   Resp 16   Ht 5' 8.5\" (1.74 m)   Wt (!) 331 lb 12.8 oz (150.5 kg)   BMI 49.72 kg/m      Lungs: clear, good air entry, no wheezes, rhonchi or rales.   Cardiac: S1 and S2 normal, no murmurs, regular rate and rhythm.   Abdomen: normal bowel sounds, soft   Extremities: " show no edema, normal peripheral pulses.   Neurological: normal, no focal findings.  Skin: clear, dry, no rashes/lesions  Psych- normal mood and affect      Pt states an understanding and agrees to the above plan.

## 2021-06-11 NOTE — TELEPHONE ENCOUNTER
Refill Approved    Rx renewed per Medication Renewal Policy. Medication was last renewed on 10/6/19.    Shagufta Choudhary, [facility] Connection Triage/Med Refill 9/8/2020     Requested Prescriptions   Pending Prescriptions Disp Refills     atorvastatin (LIPITOR) 10 MG tablet [Pharmacy Med Name: Atorvastatin Calcium Oral Tablet 10 MG] 90 tablet 0     Sig: Take 1 tablet (10 mg total) by mouth daily.       Statins Refill Protocol (Hmg CoA Reductase Inhibitors) Passed - 9/7/2020  2:04 AM        Passed - PCP or prescribing provider visit in past 12 months      Last office visit with prescriber/PCP: 8/21/2020 Ba Miguel MD OR same dept: 8/21/2020 Ba Miguel MD OR same specialty: 8/21/2020 Ba Miguel MD  Last physical: 12/15/2017 Last MTM visit: Visit date not found   Next visit within 3 mo: Visit date not found  Next physical within 3 mo: Visit date not found  Prescriber OR PCP: Ba Miguel MD  Last diagnosis associated with med order: 1. Mixed hyperlipidemia  - atorvastatin (LIPITOR) 10 MG tablet [Pharmacy Med Name: Atorvastatin Calcium Oral Tablet 10 MG]; Take 1 tablet (10 mg total) by mouth daily.  Dispense: 90 tablet; Refill: 0    If protocol passes may refill for 12 months if within 3 months of last provider visit (or a total of 15 months).

## 2021-06-12 NOTE — PROGRESS NOTES
SUBJECTIVE:   Chief Complaint   Patient presents with     Diabetes     Medication check      Follow-up     Was seen at ED 5/2017 for urinary issues, seen for follow up with [facility] and started on Flomax      Chaparro Guillen 71 y.o. male    Current Outpatient Prescriptions   Medication Sig Dispense Refill     allopurinol (ZYLOPRIM) 300 MG tablet TAKE 1 TABLET BY MOUTH DAILY 90 tablet 3     aspirin 81 mg chewable tablet Chew 81 mg daily.       atorvastatin (LIPITOR) 20 MG tablet Take 1 tablet (20 mg total) by mouth daily. 90 tablet 3     blood glucose test (ONETOUCH ULTRA TEST) strips TEST TWICE DAILY. 100 strip 6     blood-glucose meter Misc OneTouch Ultra System w/Device Kit.       furosemide (LASIX) 20 MG tablet Take 1 tablet (20 mg total) by mouth daily. 90 tablet 3     generic lancets (ONETOUCH ULTRASOFT) TEST TWICE DAILY 100 each 2     LANCETS MISC OneTouch UltraSoft Lancets Miscellaneous. Test twice daily.       lisinopril (PRINIVIL,ZESTRIL) 10 MG tablet TAKE 1 TABLET (10 MG) BY MOUTH ONCE DAILY 90 tablet 3     metFORMIN (GLUCOPHAGE-XR) 500 MG 24 hr tablet Take 3 tablets (1,500 mg total) by mouth daily with breakfast. 270 tablet 3     naproxen sodium (ALEVE) 220 MG tablet Take 220 mg by mouth every 12 (twelve) hours as needed for pain.       pravastatin (PRAVACHOL) 20 MG tablet TAKE 1 TABLET (20 MG) BY MOUTH ONCE A DAY AT BEDTIME. 90 tablet 2     triamterene-hydrochlorothiazide (DYAZIDE) 37.5-25 mg per capsule TAKE ONE CAPSULE BY MOUTH ONE TIME DAILY  90 capsule 0     tamsulosin (FLOMAX) 0.4 mg Cp24 Take 1 capsule by mouth daily.  3     No current facility-administered medications for this visit.      Allergies: Review of patient's allergies indicates no known allergies.   No LMP for male patient.    HPI:   71-year-old male here for follow-up regards to diabetes, hypertension, obesity, BPH and ER follow-up.  Reviewed ER notes and [facility] notes and lab work in regards to his ER visit.  He has been  doing better now that he is on tamsulosin and voiding without as much complication.  He is happy with and will continue tamsulosin at this time PSA levels have decreased since he initially was having the urinary retention.  His blood pressure is at goal range will continue current medications.  Reviewed recent labs kidney function but due to his ongoing edema in his lower extremities bilaterally running increases frusemide 20 mg daily he had been taking it every other day he will have him follow back up in 3 weeks with a lab visit to recheck kidney function potassium levels.  His diabetes hemoglobin A1c has decreased down to 6.4% which we are happy with will continue with current medications at this time.  In regards to his obesity we discussed the need for healthy eating and regular exercise.  Discussed with him that dietary loss to help not only his blood pressure and diabetes with lower extremity edema as well.  He was changed over to atorvastatin for his cholesterol levels and will recheck cholesterol levels today.    ROS: negative except as per HPI    OBJECTIVE:   The patient appears well, alert, oriented x 3, in no distress.  /62 (Patient Site: Right Arm, Patient Position: Sitting, Cuff Size: Thigh)  Pulse 70  Temp 98.7  F (37.1  C) (Oral)   Resp 16  Wt (!) 371 lb (168.3 kg)  BMI 56.41 kg/m2      Lungs: clear, good air entry, no wheezes, rhonchi or rales.   Cardiac: S1 and S2 normal, no murmurs, regular rate and rhythm.   Abdomen: normal bowel sounds, soft, obese  Extremities: 1-2+ edema BL legs, no open sores  Neurological: normal, no focal findings.  Skin: clear, dry, no rashes/lesions  Psych- normal mood and affect      ASSESSMENT/PLAN  1. Diabetes mellitus  At goal range at this time continue current medications follow up with pharmacist in 3 months and me in 6 sooner if needed  - Glycosylated Hemoglobin A1c  - Microalbumin, Random Urine  - Lipid Cascade FASTING    2. BPH (benign prostatic  hyperplasia)  Continue tamsulosin at this time urinary retention improved with the addition of this medication    3. Edema  Increase furosemide to 20 mg daily recheck potassium and creatinine levels in 3 weeks  - Potassium; Future  - Creatinine; Future    4. Morbid obesity, unspecified obesity type  Discussed the need for diet and exercise and weight loss  5. HTN  Blood pressure goal range continue his current medications at this time    6.  Hyperlipidemia  Check cholesterol levels now these can transition over to atorvastatin and follow-up based on results    Pt states an understanding and agrees to the above plan.  Greater than 25 minutes was spent today in interview and examination with Chaparro Guillen with more than 50% of that time in counseling and coordination of care.

## 2021-06-12 NOTE — TELEPHONE ENCOUNTER
Refill Approved    Rx renewed per Medication Renewal Policy. Medication was last renewed on 1/22/20.    Shagufta Choudhary, [facility] Connection Triage/Med Refill 10/12/2020     Requested Prescriptions   Pending Prescriptions Disp Refills     sertraline (ZOLOFT) 25 MG tablet [Pharmacy Med Name: Sertraline HCl Oral Tablet 25 MG] 90 tablet 0     Sig: Take 1 tablet (25 mg total) by mouth daily.       SSRI Refill Protocol  Passed - 10/9/2020  9:14 PM        Passed - PCP or prescribing provider visit in last year     Last office visit with prescriber/PCP: 8/21/2020 Ba Miguel MD OR same dept: 8/21/2020 Ba Miguel MD OR same specialty: 8/21/2020 Ba Miguel MD  Last physical: 12/15/2017 Last MTM visit: Visit date not found   Next visit within 3 mo: Visit date not found  Next physical within 3 mo: Visit date not found  Prescriber OR PCP: Ba Miguel MD  Last diagnosis associated with med order: 1. Anxiety  - sertraline (ZOLOFT) 25 MG tablet [Pharmacy Med Name: Sertraline HCl Oral Tablet 25 MG]; Take 1 tablet (25 mg total) by mouth daily.  Dispense: 90 tablet; Refill: 0    If protocol passes may refill for 12 months if within 3 months of last provider visit (or a total of 15 months).

## 2021-06-14 NOTE — PROGRESS NOTES
Preoperative Exam    Scheduled Procedure: Da Carmela prostatectomy  Surgery Date: 12/27/2017  Surgery Location: [facility], fax [phone]    Surgeon: Dr. Cardenas    Assessment/Plan:     1. Preop examination  Patient is cleared for surgery at this time with no anticipated complication.  - Electrocardiogram Perform and Read  - HM2(CBC w/o Differential)  - Creatinine  - Potassium    2. Need for vaccination  Update with flu vaccine today  - Influenza High Dose, Seasonal 65+ yrs      Surgical Procedure Risk: Intermediate (reported cardiac risk generally 1-5%)  Have you had prior anesthesia?: no  Have you or any family members had a previous anesthesia reaction:  no  Do you or any family members have a history of a clotting or bleeding disorder?:  No  Cardiac Risk Assessment: no increased risk for major cardiac complications    Patient approved for surgery with general or local anesthesia.    Functional Status: Independant  Patient plans to recover at home with family.     Subjective:      Chaparro Guillen is a 72 y.o. male who presents for a preoperative consultation.  Due to ongoing problems with urinary retention patient is elected for surgical correction with prostate removal.  Patient has not had surgery prior.  He has had great improvement with diuretics over the last few months with greater than a 30 pound weight loss and decrease edema in his lower extremities bilaterally.  Patient is a type II diabetic that has been well-controlled recent hemoglobin A1c less than 7%.  His co-morbidities of his weight, blood pressure and type 2 diabetes combined with his age have a slight increased risk for upcoming surgical procedure.  I do not anticipate him having complications with the procedure.    All other systems reviewed and are negative, other than those listed in the HPI.    Pertinent History  Do you have difficulty breathing or chest pain after walking up a flight of stairs: No  History of obstructive sleep  apnea: No  Steroid use in the last 6 months: No  Frequent Aspirin/NSAID use: Yes: We will hold aspirin 7-10 days prior to procedure  Prior Blood Transfusion: No  Prior Blood Transfusion Reaction: No  Blood Transfusion Statement:  There is no transfusion refusal.    Current Outpatient Prescriptions   Medication Sig Dispense Refill     allopurinol (ZYLOPRIM) 300 MG tablet TAKE 1 TABLET BY MOUTH ONCE DAILY  90 tablet 0     aspirin 81 mg chewable tablet Chew 81 mg daily.       atorvastatin (LIPITOR) 20 MG tablet Take 1 tablet (20 mg total) by mouth daily. 90 tablet 3     blood glucose test (ONETOUCH ULTRA TEST) strips TEST TWICE DAILY. 100 strip 6     blood-glucose meter Misc OneTouch Ultra System w/Device Kit.       furosemide (LASIX) 20 MG tablet TAKE 1 TABLET (20 MG) BY MOUTH ONCE DAILY 90 tablet 1     generic lancets (ONETOUCH ULTRASOFT) TEST TWICE DAILY 100 each 2     LANCETS MISC OneTouch UltraSoft Lancets Miscellaneous. Test twice daily.       lisinopril (PRINIVIL,ZESTRIL) 10 MG tablet TAKE ONE TABLET BY MOUTH ONE TIME DAILY  90 tablet 2     metFORMIN (GLUCOPHAGE-XR) 500 MG 24 hr tablet Take 3 tablets (1,500 mg total) by mouth daily with breakfast. 270 tablet 3     naproxen sodium (ALEVE) 220 MG tablet Take 220 mg by mouth every 12 (twelve) hours as needed for pain.       pravastatin (PRAVACHOL) 20 MG tablet TAKE 1 TABLET (20 MG) BY MOUTH ONCE A DAY AT BEDTIME. 90 tablet 2     silver sulfADIAZINE (SILVADENE, SSD) 1 % cream APPLY AND RUB IN A THIN FILM TO AFFECTED AREAS TWICE DAILY.(AM AND PM). 50 g 0     tamsulosin (FLOMAX) 0.4 mg Cp24 Take 1 capsule by mouth daily.  3     triamterene-hydrochlorothiazide (DYAZIDE) 37.5-25 mg per capsule TAKE ONE CAPSULE BY MOUTH ONCE DAILY  90 capsule 2     No current facility-administered medications for this visit.         No Known Allergies    Patient Active Problem List   Diagnosis     Gout     Obesity     Essential Hypertriglyceridemia     Essential Hypertension      "Cellulitis Of The Leg     Rotator Cuff Tendonitis     Type 2 diabetes mellitus       Past Medical History:   Diagnosis Date     DM2 (diabetes mellitus, type 2)      Essential hypertension      Gout        No past surgical history on file.    Social History     Social History     Marital status: Single     Spouse name: N/A     Number of children: N/A     Years of education: N/A     Occupational History     Not on file.     Social History Main Topics     Smoking status: Never Smoker     Smokeless tobacco: Not on file     Alcohol use Not on file     Drug use: Not on file     Sexual activity: Not on file     Other Topics Concern     Not on file     Social History Narrative         Objective:     Vitals:    12/15/17 0901   BP: 126/74   Pulse: 82   Resp: 22   Temp: 97.6  F (36.4  C)   TempSrc: Oral   Weight: (!) 340 lb (154.2 kg)   Height: 5' 8.5\" (1.74 m)         Physical Exam:  Physical Examination: General appearance - alert, well appearing, and in no distress  Mental status - alert, oriented to person, place, and time  Eyes - pupils equal and reactive, extraocular eye movements intact  Ears - bilateral TM's and external ear canals normal  Nose - normal and patent, no erythema, discharge or polyps  Mouth - mucous membranes moist, pharynx normal without lesions  Lymphatics - no palpable lymphadenopathy  Chest - clear to auscultation, no wheezes, rales or rhonchi, symmetric air entry  Heart - normal rate, regular rhythm, normal S1, S2, no murmurs, rubs, clicks or gallops  Abdomen - soft, nontender, nondistended, no masses or organomegaly  Obese with BMI greater than 50  Back exam - full range of motion, no tenderness, palpable spasm or pain on motion  Neurological - alert, oriented, normal speech, no focal findings or movement disorder noted  Musculoskeletal - no joint tenderness, deformity or swelling  Extremities - peripheral pulses normal, trace edema BL in LE  Skin - normal coloration and turgor, no rashes, no " suspicious skin lesions noted      There are no Patient Instructions on file for this visit.    Labs:  Heme panel, Cr and Potassium pending    Immunization History   Administered Date(s) Administered     DT (pediatric) 06/01/1993, 07/22/2003     Hep A, Adult IM (19yr & older) 08/03/2007, 08/08/2008     Hep A, historic 08/03/2007, 08/08/2008     Hep B, Adult 07/22/2003, 08/21/2003, 01/16/2004     Hep B, historic 07/22/2003, 08/21/2003, 01/16/2004     Influenza high dose, seasonal 11/08/2016     Influenza, Seasonal, Inj PF IIV3 09/16/2010     Influenza, inj, historic,unspecified 10/04/2007, 10/10/2008, 09/14/2009     Influenza, seasonal,quad inj 36+ mos 10/13/2015     Influenza, seasonal,quad inj 6-35 mos 09/16/2010, 09/28/2011, 12/05/2012     Influenza,seasonal, Inj IIV3 10/05/2004, 11/02/2005, 10/13/2006, 10/04/2007, 10/10/2008, 09/14/2009, 09/28/2011, 12/05/2012     Pneumo Conj 13-V (2010&after) 07/20/2016     Pneumo Polysac 23-V 08/08/2008     Td, Adult, Absorbed 06/01/1993, 07/22/2003     Td,adult,historic,unspecified 06/01/1993, 07/22/2003     Tdap 08/08/2008     ZOSTER 08/03/2007         Electronically signed by Ba Miguel MD 12/15/17 9:41 AM

## 2021-06-14 NOTE — TELEPHONE ENCOUNTER
Refill Approved x 2    Rx renewed per Medication Renewal Policy. Medication was last renewed on 01/06/2020. 01/07/2020.  Last office visit was 08/21/2020 with PCP.    Gillian Hagan, [facility] Triage/Med Refill 12/22/2020     Requested Prescriptions   Pending Prescriptions Disp Refills     hydroCHLOROthiazide (HYDRODIURIL) 25 MG tablet [Pharmacy Med Name: hydroCHLOROthiazide Oral Tablet 25 MG] 90 tablet 0     Sig: Take 1 tablet (25 mg) by mouth once daily       Diuretics/Combination Diuretics Refill Protocol  Passed - 12/20/2020  2:01 AM        Passed - Visit with PCP or prescribing provider visit in past 12 months     Last office visit with prescriber/PCP: 8/21/2020 Ba Miguel MD OR same dept: 8/21/2020 Ba Miguel MD OR same specialty: 8/21/2020 Ba Miguel MD  Last physical: 12/15/2017 Last MTM visit: Visit date not found   Next visit within 3 mo: Visit date not found  Next physical within 3 mo: Visit date not found  Prescriber OR PCP: Ba Miguel MD  Last diagnosis associated with med order: 1. Essential hypertension  - hydroCHLOROthiazide (HYDRODIURIL) 25 MG tablet [Pharmacy Med Name: hydroCHLOROthiazide Oral Tablet 25 MG]; Take 1 tablet (25 mg) by mouth once daily  Dispense: 90 tablet; Refill: 0    2. HTN (hypertension)  - lisinopriL (PRINIVIL,ZESTRIL) 10 MG tablet [Pharmacy Med Name: Lisinopril Oral Tablet 10 MG]; Take 1 tablet (10 mg) by mouth once a day  Dispense: 90 tablet; Refill: 0    If protocol passes may refill for 12 months if within 3 months of last provider visit (or a total of 15 months).             Passed - Serum Potassium in past 12 months      Lab Results   Component Value Date    Potassium 3.7 08/21/2020             Passed - Serum Sodium in past 12 months      Lab Results   Component Value Date    Sodium 137 08/21/2020             Passed - Blood pressure on file in past 12 months     BP Readings from Last 1 Encounters:   08/21/20 115/72              Passed - Serum Creatinine in past 12 months      Creatinine   Date Value Ref Range Status   08/21/2020 1.19 0.70 - 1.30 mg/dL Final                lisinopriL (PRINIVIL,ZESTRIL) 10 MG tablet [Pharmacy Med Name: Lisinopril Oral Tablet 10 MG] 90 tablet 0     Sig: Take 1 tablet (10 mg) by mouth once a day       Ace Inhibitors Refill Protocol Passed - 12/20/2020  2:01 AM        Passed - PCP or prescribing provider visit in past 12 months       Last office visit with prescriber/PCP: 8/21/2020 Ba Miguel MD OR same dept: 8/21/2020 Ba Miguel MD OR same specialty: 8/21/2020 Ba Miguel MD  Last physical: 12/15/2017 Last MTM visit: Visit date not found   Next visit within 3 mo: Visit date not found  Next physical within 3 mo: Visit date not found  Prescriber OR PCP: Ba Miguel MD  Last diagnosis associated with med order: 1. Essential hypertension  - hydroCHLOROthiazide (HYDRODIURIL) 25 MG tablet [Pharmacy Med Name: hydroCHLOROthiazide Oral Tablet 25 MG]; Take 1 tablet (25 mg) by mouth once daily  Dispense: 90 tablet; Refill: 0    2. HTN (hypertension)  - lisinopriL (PRINIVIL,ZESTRIL) 10 MG tablet [Pharmacy Med Name: Lisinopril Oral Tablet 10 MG]; Take 1 tablet (10 mg) by mouth once a day  Dispense: 90 tablet; Refill: 0    If protocol passes may refill for 12 months if within 3 months of last provider visit (or a total of 15 months).             Passed - Serum Potassium in past 12 months     Lab Results   Component Value Date    Potassium 3.7 08/21/2020             Passed - Blood pressure filed in past 12 months     BP Readings from Last 1 Encounters:   08/21/20 115/72             Passed - Serum Creatinine in past 12 months     Creatinine   Date Value Ref Range Status   08/21/2020 1.19 0.70 - 1.30 mg/dL Final

## 2021-06-14 NOTE — TELEPHONE ENCOUNTER
Refill Approved    Rx renewed per Medication Renewal Policy. Medication was last renewed on 1/27/20.6/30/20.    Shagufta Choudhary, [facility] Triage/Med Refill 1/7/2021     Requested Prescriptions   Pending Prescriptions Disp Refills     allopurinoL (ZYLOPRIM) 300 MG tablet [Pharmacy Med Name: Allopurinol Oral Tablet 300 MG] 90 tablet 0     Sig: Take 1 tablet (300 mg total) by mouth daily.       Allopurinol/Febuxostat Refill Protocol  Passed - 1/7/2021 11:47 AM        Passed - LFT or AST or ALT in last 12 months     Albumin   Date Value Ref Range Status   08/21/2020 3.9 3.5 - 5.0 g/dL Final     Bilirubin, Total   Date Value Ref Range Status   08/21/2020 0.8 0.0 - 1.0 mg/dL Final     Alkaline Phosphatase   Date Value Ref Range Status   08/21/2020 60 45 - 120 U/L Final     AST   Date Value Ref Range Status   08/21/2020 15 0 - 40 U/L Final     ALT   Date Value Ref Range Status   08/21/2020 22 0 - 45 U/L Final     Protein, Total   Date Value Ref Range Status   08/21/2020 7.4 6.0 - 8.0 g/dL Final                Passed - Visit with PCP or prescribing provider visit in past 12 months     Last office visit with prescriber/PCP: 8/21/2020 Ba Miguel MD OR same dept: 8/21/2020 Ba Miguel MD OR same specialty: 8/21/2020 Ba Miguel MD  Last physical: 12/15/2017 Last MTM visit: Visit date not found   Next visit within 3 mo: Visit date not found  Next physical within 3 mo: Visit date not found  Prescriber OR PCP: Ba Miguel MD  Last diagnosis associated with med order: 1. Medication management  - allopurinoL (ZYLOPRIM) 300 MG tablet [Pharmacy Med Name: Allopurinol Oral Tablet 300 MG]; Take 1 tablet (300 mg total) by mouth daily.  Dispense: 90 tablet; Refill: 0    2. HTN (hypertension)  - furosemide (LASIX) 20 MG tablet [Pharmacy Med Name: Furosemide Oral Tablet 20 MG]; Take 1 tablet (20 mg) by mouth once a day  Dispense: 90 tablet; Refill: 0    If protocol passes may refill for 12  months if within 3 months of last provider visit (or a total of 15 months).                furosemide (LASIX) 20 MG tablet [Pharmacy Med Name: Furosemide Oral Tablet 20 MG] 90 tablet 0     Sig: Take 1 tablet (20 mg) by mouth once a day       Diuretics/Combination Diuretics Refill Protocol  Passed - 1/7/2021 11:47 AM        Passed - Visit with PCP or prescribing provider visit in past 12 months     Last office visit with prescriber/PCP: 8/21/2020 Ba Miguel MD OR same dept: 8/21/2020 Ba Miguel MD OR same specialty: 8/21/2020 Ba Miguel MD  Last physical: 12/15/2017 Last MTM visit: Visit date not found   Next visit within 3 mo: Visit date not found  Next physical within 3 mo: Visit date not found  Prescriber OR PCP: Ba Miguel MD  Last diagnosis associated with med order: 1. Medication management  - allopurinoL (ZYLOPRIM) 300 MG tablet [Pharmacy Med Name: Allopurinol Oral Tablet 300 MG]; Take 1 tablet (300 mg total) by mouth daily.  Dispense: 90 tablet; Refill: 0    2. HTN (hypertension)  - furosemide (LASIX) 20 MG tablet [Pharmacy Med Name: Furosemide Oral Tablet 20 MG]; Take 1 tablet (20 mg) by mouth once a day  Dispense: 90 tablet; Refill: 0    If protocol passes may refill for 12 months if within 3 months of last provider visit (or a total of 15 months).             Passed - Serum Potassium in past 12 months      Lab Results   Component Value Date    Potassium 3.7 08/21/2020             Passed - Serum Sodium in past 12 months      Lab Results   Component Value Date    Sodium 137 08/21/2020             Passed - Blood pressure on file in past 12 months     BP Readings from Last 1 Encounters:   08/21/20 115/72             Passed - Serum Creatinine in past 12 months      Creatinine   Date Value Ref Range Status   08/21/2020 1.19 0.70 - 1.30 mg/dL Final

## 2021-06-15 NOTE — PROGRESS NOTES
ASSESSMENT/PLAN  1. Pain of right heel  Possible slight neuropathy  Possible plantar fasciitis discussed with the patient  We will continue to monitor at this time, discussed proper shoe wear    2. Anxiety  Increasing problems with anxiety  He like to try medication to help lower his anxiety and worry  Discussed different options and elected to start with sertraline 25 mg once daily  We will have him follow back up with me in 1 month for reevaluation  Contact me sooner if having problems    3. S/P prostatectomy  Reviewed hospital course and surgery with him  Is doing well status post prostatectomy  Continue to follow with urology    4. Class 3 obesity without serious comorbidity with body mass index (BMI) of 45.0 to 49.9 in adult, unspecified obesity type  Patient is doing well with weight loss  Discussed continued regular exercise and healthy eating  Continue to work on weight loss at this time    5. Essential hypertension  Blood pressure goal range continue his current medications        SUBJECTIVE:   Chief Complaint   Patient presents with     Follow-up     Post Prostatectomy      Anxiety     pt states it is very hard to relax, worries alot     Chaparro Guillen 72 y.o. male    Current Outpatient Prescriptions   Medication Sig Dispense Refill     allopurinol (ZYLOPRIM) 300 MG tablet Take 300 mg by mouth daily.       atorvastatin (LIPITOR) 20 MG tablet Take 1 tablet (20 mg total) by mouth daily. 90 tablet 3     blood glucose test (ONETOUCH ULTRA TEST) strips TEST TWICE DAILY. 100 strip 6     blood-glucose meter Misc OneTouch Ultra System w/Device Kit.       furosemide (LASIX) 20 MG tablet Take 20 mg by mouth daily.       generic lancets (ONETOUCH ULTRASOFT) TEST TWICE DAILY 100 each 2     LANCETS MISC OneTouch UltraSoft Lancets Miscellaneous. Test twice daily.       lisinopril (PRINIVIL,ZESTRIL) 10 MG tablet Take 10 mg by mouth daily.       metFORMIN (GLUCOPHAGE-XR) 500 MG 24 hr tablet Take 3 tablets (1,500 mg  total) by mouth daily with breakfast. 270 tablet 3     naproxen sodium (ALEVE) 220 MG tablet Take 200 mg by mouth daily as needed for pain.       senna-docusate (PERICOLACE) 8.6-50 mg tablet Take 1 tablet by mouth 2 (two) times a day.  0     sertraline (ZOLOFT) 25 MG tablet Take 1 tablet (25 mg total) by mouth daily. 30 tablet 2     silver sulfADIAZINE (SILVADENE, SSD) 1 % cream Apply 1 application topically 2 (two) times a day as needed.       triamterene-hydrochlorothiazide (DYAZIDE) 37.5-25 mg per capsule Take 1 capsule by mouth every morning.       No current facility-administered medications for this visit.      Allergies: Review of patient's allergies indicates no known allergies.   No LMP for male patient.    HPI:   Patient is here for follow-up regards to her recent surgery for prostatectomy  Is been doing well status post surgery and is happy with the overall improvement  Is to follow with urology still at this time  He has problems with continued worrying and anxiety about multitude of different things and would like to try medication to improve the symptoms  Discussed different options with him today he like to move forward with sertraline discussed possible side effects of the medication with him  Discussed how to take 4-6 weeks usually taken to affect  We will follow back up in 1 month for reevaluation  Is been noting some left-sided heel pain that has been bothering him exercise to her tomorrow when he does not put pressure on the area and improves when he does get up and ambulate he is also periodically had some tingling sensation and discussed the possibility of some nerve damage in the area we will continue to monitor at this time  Blood pressure goal range continue his current medications at this time  Congratulated patient on his improvement on weight he is down quite a bit since last time I saw him will continue with healthy eating and trying to get regular exercise  ROS: negative except as per  HPI    OBJECTIVE:   The patient appears well, alert, oriented x 3, in no distress.  /68 (Patient Site: Right Arm, Patient Position: Sitting, Cuff Size: Adult Large)  Pulse 86  Temp 97.5  F (36.4  C) (Oral)   Resp 18  Wt (!) 313 lb (142 kg)  SpO2 98%  BMI 46.9 kg/m2    Lungs: clear, good air entry, no wheezes, rhonchi or rales.   Cardiac: S1 and S2 normal, no murmurs, regular rate and rhythm.   Abdomen: normal bowel sounds, soft   Extremities: show no edema, normal peripheral pulses.   Neurological: normal, no focal findings.  Skin: clear, dry, no rashes/lesions  Psych- normal mood and affect      Pt states an understanding and agrees to the above plan.  Greater than 25 minutes was spent today in interview and examination with Chaparro Guillen with more than 50% of that time in counseling and coordination of care.

## 2021-06-15 NOTE — PROGRESS NOTES
[facility] Patient Outreach:     Discharge Date::  1/5/18      Reason for TCU stay (discharge diagnosis)::  S/p prostatectomy, DM      Are you feeling better, the same or worse since your discharge?:  Patient is feeling better (went to ER on Saturday feeling decreased voiding.  Pt said the ER said he was voiding well, but has UTI.  Currently on Cipro.  )          As part of your discharge plan, did they discuss home care with you?: Yes        Have your seen them yet, or are they scheduled to visit?: Yes                Do you have any follow up visits scheduled with your PCP or Specialist?:  Yes, with Specialist      (RN) Is it scheduled soon enough (3-5 days)?: No    Who are you seeing and when is it scheduled?:  Seeing urologist(Ronald) on 1/12      I'm glad to hear you're doing well and we want you to continue to do well. Your PCP would like to see you for a follow-up visit. Can we help set that up for your today?: Yes            (RN) Patient transferred to [facility]? **If immediate concers (e.g. patient is feeling worse and/or not taking new medictations), send in basket message to PCP with quick summary of concern.: Yes

## 2021-06-15 NOTE — ANESTHESIA PREPROCEDURE EVALUATION
Anesthesia Evaluation        Airway   Mallampati: II  Neck ROM: full   Pulmonary - negative ROS and normal exam                          Cardiovascular - normal exam  (+) hypertension, ,     ECG reviewed        Neuro/Psych - negative ROS     Endo/Other    (+) diabetes mellitus type 2 well controlled, obesity,      Comments: Gout    GI/Hepatic/Renal       Other findings: 340#      Dental    (+) caps                       Anesthesia Plan  Planned anesthetic: general endotracheal    ASA 2   Induction: intravenous   Anesthetic plan and risks discussed with: patient    Post-op plan: routine recovery

## 2021-06-15 NOTE — PATIENT INSTRUCTIONS - HE
We are working hard to begin vaccinating more people against COVID-19. Currently, we are only vaccinating individuals age 75 and older and Phase 1a workers - healthcare workers who are unable to do their job remotely. Vaccine availability is very limited.    If you are 75 or older, or a healthcare worker who is unable to do your job remotely, please log in to [employer] using this link to schedule an appointment.  If there are no appointments left, you will be unable to schedule and need to check back later.  If you are a healthcare worker, you will be asked to provide proof of employment at your appointment. If you cannot, you will be turned away.    Vaccine appointments are being added as they become available. Please check your [employer] account frequently for availability. If you have technical difficulty using [employer], call [phone] for assistance.    You can learn more about the [facility]'s phased approach to administering the vaccine, with details on each phase, [email or web address].      Aa vaccine supply increases and we are able to open appointments to more groups, we will share that information on our website [email or web address]. Check this website to stay up to date on COVID-19 vaccination information.      Call Monday for possible 70+ covid vaccine  Complete COVID series then finish shingles

## 2021-06-15 NOTE — PROGRESS NOTES
"    Assessment & Plan     Type 2 diabetes mellitus (H)  A1c returns at goal range 5.9%  Continue with current diabetic medications  Follow-up in 6-month interval  - Glycosylated Hemoglobin A1c  - Comprehensive Metabolic Panel    Essential hypertension  Blood pressure at goal range  Continue with current medications check electrolytes and kidney function    Morbid obesity (H)  Patient is aware of his weight and the need for weight loss we discussed diet and exercise    Anxiety  Patient feels current dosing is working well  We will continue with current sertraline      30 minutes spent on the date of the encounter doing chart review, patient visit and documentation        BMI:   Estimated body mass index is 49 kg/m  as calculated from the following:    Height as of 8/21/20: 5' 8.5\" (1.74 m).    Weight as of this encounter: 327 lb (148.3 kg).       No follow-ups on file.    Ba Miguel MD  [facility]   Chaparro Guillen is 75 y.o. and presents today for the following health issues   HPI   Patient is here for medication check.  Patient's A1c returns at 5.9%.  Continue with current diet and medications.  Blood pressure at goal range.  Discussed exercise with the patient as he has been not compliant with regular exercise.  Maintain on current medications.  Discussed the Covid pandemic.  Patient interested in the Covid vaccine and information on obtaining this was given to the patient.  Patient started the Shingrix vaccine about a month ago discussed with him I like him to complete his Covid vaccine series prior to going back to get the second shot for shingles.  Patient feels current dosing of sertraline is working well for his anxiety.    Review of Systems  Negative other than stated above      Objective    /80 (Patient Site: Right Arm, Patient Position: Sitting, Cuff Size: Adult Large)   Pulse 66   Resp 18   Wt (!) 327 lb (148.3 kg)   BMI 49.00 kg/m  "   Body mass index is 49 kg/m .  Physical Exam  Physical Examination: General appearance - alert, well appearing, and in no distress  Mental status - alert, oriented to person, place, and time  Eyes - pupils equal and reactive, extraocular eye movements intact  Chest - clear to auscultation, no wheezes, rales or rhonchi, symmetric air entry  Heart - normal rate, regular rhythm, normal S1, S2, no murmurs, rubs, clicks or gallops  Abdomen - soft, nontender, obese   Back exam - full range of motion, no tenderness, palpable spasm or pain on motion  Musculoskeletal - no joint tenderness, deformity or swelling  Skin - normal coloration and turgor, no rashes, no suspicious skin lesions noted

## 2021-06-15 NOTE — TELEPHONE ENCOUNTER
----- Message from Ba Miguel MD sent at 2/25/2021  8:47 AM CST -----  Please inform patient that kidney and liver function are normal.  Electrolytes are at goal range.  Hgba1c looks great at 5.9%  No concerns at this time.
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
## 2021-06-15 NOTE — ANESTHESIA CARE TRANSFER NOTE
Last vitals:   Vitals:    12/27/17 1407   BP: 133/61   Pulse: 90   Resp: 18   Temp: 36.4  C (97.6  F)   SpO2: 98%     Patient's level of consciousness is awake  Spontaneous respirations: yes  Maintains airway independently: yes  Dentition unchanged: yes  Oropharynx: oropharynx clear of all foreign objects    QCDR Measures:  ASA# 20 - Surgical Safety Checklist: WHO surgical safety checklist completed prior to induction  PQRS# 430 - Adult PONV Prevention: 4558F - Pt received => 2 anti-emetic agents (different classes) preop & intraop  ASA# 8 - Peds PONV Prevention: NA - Not pediatric patient, not GA or 2 or more risk factors NOT present  PQRS# 424 - Liset-op Temp Management: 4559F - At least one body temp DOCUMENTED => 35.5C or 95.9F within required timeframe  PQRS# 426 - PACU Transfer Protocol: - Transfer of care checklist used  ASA# 14 - Acute Post-op Pain: ASA14B - Patient did NOT experience pain >= 7 out of 10

## 2021-06-15 NOTE — ANESTHESIA POSTPROCEDURE EVALUATION
Patient: Chaparro ESCAMILLA SIMPLE PROSTATECTOMY  Anesthesia type: general    Patient location: PACU  Last vitals:   Vitals:    12/27/17 1740   BP: 127/80   Pulse: 78   Resp: 18   Temp: 36.6  C (97.8  F)   SpO2: 96%     Post vital signs: stable  Level of consciousness: awake and responds to simple questions  Post-anesthesia pain: pain controlled  Post-anesthesia nausea and vomiting: no  Pulmonary: unassisted, return to baseline  Cardiovascular: stable and blood pressure at baseline  Hydration: adequate  Anesthetic events: no    QCDR Measures:  ASA# 11 - Liset-op Cardiac Arrest: ASA11B - Patient did NOT experience unanticipated cardiac arrest  ASA# 12 - Liset-op Mortality Rate: ASA12B - Patient did NOT die  ASA# 13 - PACU Re-Intubation Rate: ASA13B - Patient did NOT require a new airway mgmt  ASA# 10 - Composite Anes Safety: ASA10A - No serious adverse event       Additional Notes:

## 2021-06-15 NOTE — PROGRESS NOTES
[facility]      Facility:    [facility] [[id number]]  Code Status: UNKNOWN      Chief Complaint/Reason for Visit:  Chief Complaint   Patient presents with     H & P     Status post simple prostatectomy secondary to benign prostatic hypertrophy, hypertension with hypotension postoperatively.  History of gout, chronic edema, morbid obesity, pain management.       HPI:   Chaparro is a 72 y.o. male who was recently admitted to the hospital on 12/27/2017 for elective simple prostatectomy secondary to benign prostatic hypertrophy.  He underwent a da Carmela simple prostatectomy and it was diagnosed as benign nodular, granular and stromal hyperplasia.  There is no evidence of malignancy and he did have patchy mild chronic inflammation.  He did have mild acute on chronic urethritis with focal squamous metaplasia.  There was no evidence of any dysplasia or malignancy.  Postoperatively he was somewhat hypotensive but his medications were adjusted.  He underwent the procedure otherwise with no other perioperative or postoperative complications and was transferred here to the TCU at [facility] in stable condition.    Patient is quite anxious and worried he does worry about everything he does tell me he is a worrier.  At this time is worried because she still has some blood in his urine and is pink in the bag but the tube looks pretty clear.  There is no sentiment at this time and I did reassure him of that.  His blood sugars have been running very well here in his lower extremity edema is doing pretty well.  He has had a weight loss of recent times but he is trying to lose weight and believes this is good.  He is going to the urologist tomorrow to get the Rolon catheter out he will likely return here for a voiding trial and then he will go home after that.  He has no other concerns today.    Past Medical History:  Past Medical History:   Diagnosis Date     DM2 (diabetes  mellitus, type 2)      Essential hypertension      Gout            Surgical History:  Past Surgical History:   Procedure Laterality Date     DC GENITAL SURG PROC,MALE UNLISTED N/A 12/27/2017    Procedure: DAVINCI SIMPLE PROSTATECTOMY;  Surgeon: Jaya Cardenas MD;  [place] [facility] [place]       Family History:   History reviewed. No pertinent family history.    Social History:    Social History     Social History     Marital status: Single     Spouse name: N/A     Number of children: N/A     Years of education: N/A     Social History Main Topics     Smoking status: Never Smoker     Smokeless tobacco: Never Used     Alcohol use No      Comment: rarely     Drug use: No     Sexual activity: Not Asked     Other Topics Concern     None     Social History Narrative          Review of Systems   Constitutional: Negative for activity change, appetite change, chills and fever.   HENT: Negative for rhinorrhea.    Eyes: Negative for visual disturbance.   Respiratory: Negative for chest tightness and shortness of breath.    Cardiovascular: Positive for leg swelling. Negative for chest pain and palpitations.   Gastrointestinal: Positive for abdominal distention. Negative for abdominal pain, nausea and vomiting.   Endocrine: Negative for polydipsia, polyphagia and polyuria.   Genitourinary:        Patient has a Rolon catheter in.   Musculoskeletal: Negative for neck pain and neck stiffness.   Skin: Negative for rash.   Neurological: Negative for dizziness, weakness and light-headedness.   Hematological: Does not bruise/bleed easily.   Psychiatric/Behavioral: Negative for behavioral problems and confusion. The patient is nervous/anxious.        Vitals:    01/02/18 1108   BP: 107/68   Pulse: 74   Resp: 20   Temp: 97.5  F (36.4  C)   SpO2: 96%       Physical Exam   Constitutional: He appears well-developed and well-nourished. No distress.   HENT:   Head: Normocephalic and atraumatic.   Nose: Nose normal.    Mouth/Throat: No oropharyngeal exudate.   Eyes: Conjunctivae are normal. Right eye exhibits no discharge. Left eye exhibits no discharge. No scleral icterus.   Neck: Neck supple. No thyromegaly present.   Cardiovascular: Normal rate and regular rhythm.    No murmur heard.  Pulmonary/Chest: Effort normal and breath sounds normal. No respiratory distress. He has no wheezes. He has no rales.   Abdominal: Soft. Bowel sounds are normal. He exhibits distension. There is tenderness. There is no rebound and no guarding.   Genitourinary:   Genitourinary Comments: Urine in his bag is slightly pink however the urine in his tube is clear.   Musculoskeletal: He exhibits edema.   Lymphadenopathy:     He has no cervical adenopathy.   Neurological: He is alert. No cranial nerve deficit. He exhibits normal muscle tone.   Skin: Skin is warm and dry. He is not diaphoretic.   Psychiatric: He has a normal mood and affect. His behavior is normal.       Medication List:  Current Outpatient Prescriptions   Medication Sig     allopurinol (ZYLOPRIM) 300 MG tablet Take 300 mg by mouth daily.     atorvastatin (LIPITOR) 20 MG tablet Take 1 tablet (20 mg total) by mouth daily.     blood glucose test (ONETOUCH ULTRA TEST) strips TEST TWICE DAILY.     blood-glucose meter Misc OneTouch Ultra System w/Device Kit.     furosemide (LASIX) 20 MG tablet Take 20 mg by mouth daily.     generic lancets (ONETOUCH ULTRASOFT) TEST TWICE DAILY     HYDROcodone-acetaminophen (NORCO) 5-325 mg per tablet Take 1-2 tablets by mouth every 6 (six) hours as needed for pain.     LANCETS MISC OneTouch UltraSoft Lancets Miscellaneous. Test twice daily.     lisinopril (PRINIVIL,ZESTRIL) 10 MG tablet Take 10 mg by mouth daily.     metFORMIN (GLUCOPHAGE-XR) 500 MG 24 hr tablet Take 3 tablets (1,500 mg total) by mouth daily with breakfast.     naproxen sodium (ALEVE) 220 MG tablet Take 200 mg by mouth daily as needed for pain.     senna-docusate (PERICOLACE) 8.6-50 mg tablet  Take 1 tablet by mouth 2 (two) times a day.     silver sulfADIAZINE (SILVADENE, SSD) 1 % cream Apply 1 application topically 2 (two) times a day as needed.     triamterene-hydrochlorothiazide (DYAZIDE) 37.5-25 mg per capsule Take 1 capsule by mouth every morning.       Labs: Hospital labs are as follows; on 12/29/2017 sodium was 135, potassium was 3.9, calcium was 8.5, BUN was 18, creatinine was 0.97, GFR was greater than 60.  White count was 9.0, hemoglobin was 10.9, platelets are 221,000, A1c was 6.3 on 12/28/2017.      Assessment:    ICD-10-CM    1. BPH (benign prostatic hyperplasia) N40.0    2. Status post prostatectomy Z90.79    3. Pain management R52    4. Lower extremity edema R60.0    5. Hypertension I10    6. Diabetes mellitus E11.9        Plan: Plan at this time patient will remain here today he will go to urology tomorrow to his Rolon catheter removed and he will return tomorrow for 4 post void residuals and voiding trials.  He will be no change in his diabetic medications at this time and no changes in his pain medications.  His lower extremity edema seems to be baseline and seems to be according to him less than it has been before.  His blood pressure is normotensive at this time so no other changes to care plan at this time.  I will continue to monitor above medical problems.        Electronically signed by: Isidro Castillo DO

## 2021-06-16 PROBLEM — Z90.79 S/P PROSTATECTOMY: Status: ACTIVE | Noted: 2018-01-04

## 2021-06-16 NOTE — TELEPHONE ENCOUNTER
Fax from pharmacy requesting new glucose meter and testing supplies. Orders pended. Please review and sign.

## 2021-06-16 NOTE — PROGRESS NOTES
ASSESSMENT/PLAN  1. Type 2 diabetes mellitus  Hemoglobin A1c at goal range and improved since last visit  Hemoglobin A1c down at 5.7%  Continue with current medications at this time and follow-up in 3 months if you continue to have good control of the diabetes suggest decreasing down off metformin as he has had good success with weight loss at this time will try to remove medications as tolerated  He is excited to hear this and will continue to work on lifestyle modification  - Glycosylated Hemoglobin A1c  - Microalbumin, Random Urine    2. Screen for colon cancer  Patient will continue insurance coverage for cologuard  - Cologuard    3. Essential hypertension  Blood pressure low end of normal with his weight loss I think we can get by with less blood pressure medication he is in agreement  We will discontinue triamterene and continue with lisinopril and hydrochlorothiazide  He will return for nurse blood pressure check in 10 days and will adjust medications accordingly encourage him to continue work on weight loss and hopefully will continue to be able to pull down on medications    4. Class 3 obesity with serious comorbidity and body mass index (BMI) of 45.0 to 49.9 in adult, unspecified obesity type  Patient is been doing wonderful with weight loss over the last few months congratulated on his success  Encouraged him to continue with his current progress    5. Anxiety  Patient is taking the sertraline at this time without any side effects he feels he thinks it is working slightly and I encouraged him to continue with current dosing  He is noted little less anxiety may be a little improvement in appetite which she is wary of with his current progress with weight loss encouraged him to continue with the medication continue work on diet and exercise        SUBJECTIVE:   Chief Complaint   Patient presents with     Diabetes     Medication check - fasting labs      Colon Cancer Screening     Jorge BLACKMAN  Andrykatie 72 y.o. male    Current Outpatient Prescriptions   Medication Sig Dispense Refill     allopurinol (ZYLOPRIM) 300 MG tablet TAKE 1 TABLET BY MOUTH ONCE DAILY 90 tablet 0     atorvastatin (LIPITOR) 20 MG tablet Take 1 tablet (20 mg total) by mouth daily. 90 tablet 3     blood glucose test (ONETOUCH ULTRA TEST) strips TEST TWICE DAILY. 100 strip 6     blood-glucose meter Misc OneTouch Ultra System w/Device Kit.       furosemide (LASIX) 20 MG tablet Take 20 mg by mouth daily.       generic lancets (ONETOUCH ULTRASOFT) TEST TWICE DAILY 100 each 2     LANCETS MISC OneTouch UltraSoft Lancets Miscellaneous. Test twice daily.       lisinopril (PRINIVIL,ZESTRIL) 10 MG tablet Take 10 mg by mouth daily.       metFORMIN (GLUCOPHAGE-XR) 500 MG 24 hr tablet Take 3 tablets (1,500 mg total) by mouth daily with breakfast. 270 tablet 3     sertraline (ZOLOFT) 25 MG tablet Take 1 tablet (25 mg total) by mouth daily. 30 tablet 2     silver sulfADIAZINE (SILVADENE, SSD) 1 % cream Apply 1 application topically 2 (two) times a day as needed.       allopurinol (ZYLOPRIM) 300 MG tablet Take 300 mg by mouth daily.       hydroCHLOROthiazide (HYDRODIURIL) 25 MG tablet Take 1 tablet (25 mg total) by mouth daily. 30 tablet 0     naproxen sodium (ALEVE) 220 MG tablet Take 200 mg by mouth daily as needed for pain.       senna-docusate (PERICOLACE) 8.6-50 mg tablet Take 1 tablet by mouth 2 (two) times a day.  0     No current facility-administered medications for this visit.      Allergies: Review of patient's allergies indicates no known allergies.   No LMP for male patient.    HPI:   72-year-old male here for follow-up regards to diabetes hypertension and weight loss.  Patient like to pursue colon cancer screening with juanito.  He will check with his insurance for coverage.  His hemoglobin A1c returns at goal range below 6% which I congratulated him upon.  He is excited with the improvement is also been working on weight loss.  He has  lost a great amount of weight and continues to have a goal below 300 and he is working on these changes currently.  Congratulated on success so far.  Discussed with him considering in the next 3 months he continues to have good control of his glucose and weight management for her titrating down on metformin use.  He is excited to hear this possibility.  His blood pressures of low end of normal and with his weight loss I think he we can get by with less blood pressure controlling medication we will discontinue triamterene but continue with lisinopril and hydrochlorothiazide at this time.  He will return for nurse blood pressure check in 10 days.    ROS: negative except as per HPI    OBJECTIVE:   The patient appears well, alert, oriented x 3, in no distress.  BP 98/64 (Patient Site: Right Arm, Patient Position: Sitting, Cuff Size: Adult Large)  Pulse 84  Temp 98.3  F (36.8  C) (Oral)   Resp 20  Wt (!) 307 lb (139.3 kg)  BMI 46 kg/m2    Lungs: clear, good air entry, no wheezes, rhonchi or rales.   Cardiac: S1 and S2 normal, no murmurs, regular rate and rhythm.   Abdomen: normal bowel sounds, soft obese BMI 46  Extremities: show no edema, normal peripheral pulses.   Neurological: normal, no focal findings.  Skin: clear, dry, no rashes/lesions  Psych- normal mood and affect      Pt states an understanding and agrees to the above plan.  Greater than 25 minutes was spent today in interview and examination with Chaparro Guillen with more than 50% of that time in counseling and coordination of care.

## 2021-06-16 NOTE — PROGRESS NOTES
I met with Chaparro Guillen at the request of Dr. Miguel to recheck his blood pressure.  Blood pressure medications on the MAR were reviewed with patient.    Patient has taken all medications as per usual regimen: Yes  Patient reports tolerating them without any issues or concerns: Yes    Vitals:    02/26/18 0855   BP: 110/76   Pulse: 66       Blood pressure was taken, previous encounter was reviewed, recorded blood pressure below 140/90.  Patient was discharged and the note will be sent to the provider for final review.

## 2021-06-18 NOTE — PROGRESS NOTES
After Visit Summary   12/5/2018    Salbador Begum    MRN: [id number]           Patient Information     Date Of Birth          1955        Visit Information        Provider Department      12/5/2018 10:20 AM Kieran Zuluaga MD [facility]        Today's Diagnoses     Strain of neck muscle, initial encounter    -  1      Care Instructions    Check your insurance coverage for SHINGRIX vaccine. It is a new shingles vaccine. It is very effective in cutting down chances of shingles. It is  2 shot series that is administered atleast 2 months apart. If it is covered, call our clinic and schedule nurse only appointment to get the vaccine. Some insurance covers it if it is given in the pharmacy. Check with your pharmacy if that is the case.             Follow-ups after your visit        Additional Services     [facility] PT, HAND, AND CHIROPRACTIC REFERRAL       Physical Therapy, Hand Therapy and Chiropractic Care are available through:  *[facility]  *Hand Therapy (Occupational Therapy or Physical Therapy)  *[facility]    Call one number to schedule at any of the above locations: [phone].    Physical therapy, Hand therapy and/or Chiropractic care has been recommended by your physician as an excellent treatment option to reduce pain and help people return to normal activities, including sports.  Therapy and/or chiropractic care services are a great complement or alternative to expensive and invasive surgery, injections, or long-term use of prescription medications. The primary goal is to identify the underlying problem and provide you the tools to manage your condition on your own.     Please be aware that coverage of these services is subject to the terms and limitations of your health insurance plan.  Call member services at your health plan with any benefit or coverage questions.      Please bring the following to your  ASSESSMENT/PLAN  1. Diabetes  Patient is early for hemoglobin A1c will return next week for lab draw  Glucose levels been running the low 100s on a regular basis  Previous A1c below 6%  Continue with current medications at this time and follow-up based on hemoglobin A1c next week  - Comprehensive Metabolic Panel; Future  - Glycosylated Hemoglobin A1c; Future  - Lipid [place] FASTING; Future    2. Essential hypertension  Patient's blood pressure upper limits of normal  Patient had about 20 pound weight gain since we saw him last  Discussed with him the need for regular exercise  Continue current medications at this point  - furosemide (LASIX) 20 MG tablet; Take 1 tablet (20 mg total) by mouth daily.  Dispense: 90 tablet; Refill: 0    3. Mixed hyperlipidemia  Patient is due for cholesterol levels will obtain levels when he returns fasting next week    4. Class 3 obesity with serious comorbidity and body mass index (BMI) of 45.0 to 49.9 in adult, unspecified obesity type  Discussed patient's weight and weight gain  Discussed the need for diet and exercise for weight loss  Patient has not been exercising on a regular basis and discussed with him the need to do so        SUBJECTIVE:   Chief Complaint   Patient presents with     Diabetes     Medication check      Immunizations     Will check with insurance on coverage of Shingrix      Chaparro Guillen 72 y.o. male    Current Outpatient Prescriptions   Medication Sig Dispense Refill     allopurinol (ZYLOPRIM) 300 MG tablet TAKE ONE TABLET BY MOUTH ONCE DAILY  90 tablet 0     atorvastatin (LIPITOR) 20 MG tablet TAKE 1 TABLET BY MOUTH ONCE DAILY  90 tablet 2     blood glucose test (ONETOUCH ULTRA TEST) strips TEST TWICE DAILY. 100 strip 6     blood-glucose meter Misc OneTouch Ultra System w/Device Kit.       furosemide (LASIX) 20 MG tablet Take 1 tablet (20 mg total) by mouth daily. 90 tablet 0     generic lancets (ONETOUCH ULTRASOFT) TEST TWICE DAILY 100 each 2      "appointment:  *Your personal calendar for scheduling future appointments  *Comfortable clothing                  Future tests that were ordered for you today     Open Future Orders        Priority Expected Expires Ordered    [facility], HAND, AND CHIROPRACTIC REFERRAL Routine  12/5/2019 12/5/2018            Who to contact     If you have questions or need follow up information about today's clinic visit or your schedule please contact [facility] directly at [phone].  Normal or non-critical lab and imaging results will be communicated to you by [employer], letter or phone within 4 business days after the clinic has received the results. If you do not hear from us within 7 days, please contact the clinic through [email or web address] or phone. If you have a critical or abnormal lab result, we will notify you by phone as soon as possible.  Submit refill requests through [email or web address] or call your pharmacy and they will forward the refill request to us. Please allow 3 business days for your refill to be completed.          Additional Information About Your Visit        [email or web address] Information     [email or web address] gives you secure access to your electronic health record. If you see a primary care provider, you can also send messages to your care team and make appointments. If you have questions, please call your primary care clinic.  If you do not have a primary care provider, please call [phone] and they will assist you.        Care EveryWhere ID     This is your Care EveryWhere ID. This could be used by other organizations to access your [facility] medical records  [id number]        Your Vitals Were     Pulse Temperature Respirations Height Pulse Oximetry BMI (Body Mass Index)    48 97  F (36.1  C) (Tympanic) 16 5' 8\" (1.727 m) 100% 21.36 kg/m2       Blood Pressure from Last 3 Encounters:   12/05/18 127/79   07/30/18 124/75   06/29/18 124/60    Weight from Last 3 Encounters:   12/05/18 140 lb 8 oz (63.7 kg)   07/30/18 145 lb 1.6 oz " hydroCHLOROthiazide (HYDRODIURIL) 25 MG tablet Take 1 tablet (25 mg total) by mouth daily. 90 tablet 3     LANCETS MISC OneTouch UltraSoft Lancets Miscellaneous. Test twice daily.       lisinopril (PRINIVIL,ZESTRIL) 10 MG tablet Take 10 mg by mouth daily.       metFORMIN (GLUCOPHAGE-XR) 500 MG 24 hr tablet TAKE 3 TABLETS BY MOUTH ONCE DAILY WITH BREAKFAST  270 tablet 0     sertraline (ZOLOFT) 25 MG tablet Take 1 tablet (25 mg total) by mouth daily. 90 tablet 2     silver sulfADIAZINE (SILVADENE, SSD) 1 % cream Apply 1 application topically 2 (two) times a day as needed.       No current facility-administered medications for this visit.      Allergies: Review of patient's allergies indicates no known allergies.   No LMP for male patient.    HPI:   Patient is here for diabetic follow-up as well as medication check.  Patient is early under the 91 daytime.  For repeating hemoglobin A1c.  Previous A1c 5.7%.  He will return next week with for fasting labs and will follow-up based on results.  His average glucose readings are running in the low 100s on an average basis he is currently taking 1500 mg extended release metformin  Reviewed results from recent [facility]  Patient's blood pressures at goal range but has elevated from previous he has gained 20 pounds since I saw him last  Discussed with the need for diet and exercise and we discussed his weight  Patient is retired and is not exercising states that he has been waiting for to become nicer outside  We had a conversation that he needs to have a plan year-round as we do not have perfect whether year-round [place]  Patient will return fasting next week will be evaluated for his statin  Consideration moving diabetic follow-up out to six-month intervals if A1c returns at goal range    ROS: negative except as per HPI    OBJECTIVE:   The patient appears well, alert, oriented x 3, in no distress.  /80 (Patient Site: Right Arm, Patient Position: Sitting, Cuff Size:  (65.8 kg)   06/29/18 143 lb 6.4 oz (65 kg)                 Today's Medication Changes          These changes are accurate as of 12/5/18 10:52 AM.  If you have any questions, ask your nurse or doctor.               Start taking these medicines.        Dose/Directions    tiZANidine 4 MG tablet   Commonly known as:  ZANAFLEX   Used for:  Strain of neck muscle, initial encounter   Started by:  Kieran Zuluaga MD        Dose:  2-4 mg   Take 0.5-1 tablets (2-4 mg) by mouth 3 times daily   Quantity:  30 tablet   Refills:  0            Where to get your medicines      These medications were sent to [facility] [place] [facility] [place], [place] - [address]  [address] [facility], [place] [address]     Phone:  [phone]     tiZANidine 4 MG tablet                Primary Care Provider Office Phone # Fax #    Kobe Pierce [phone]       [address]  [facility] [address]        Equal Access to Services     [facility] AH: Hadii dewayne ku hadasho Soomaali, waaxda luqadaha, qaybta kaalmada adeegyada, waxay idiin haypebblesn bryan avelar . So [facility] [phone].    ATENCIÓN: Si habla español, tiene a razo disposición servicios gratuitos de asistencia lingüística. [facility] [phone].    We comply with applicable federal civil rights laws and [place] laws. We do not discriminate on the basis of race, color, national origin, age, disability, sex, sexual orientation, or gender identity.            Thank you!     Thank you for choosing [facility]  for your care. Our goal is always to provide you with excellent care. Hearing back from our patients is one way we can continue to improve our services. Please take a few minutes to complete the written survey that you may receive in the mail after your visit with us. Thank you!             Your Updated Medication List - Protect others around you: Learn how to safely use, store and throw away your medicines at www.disposemymeds.org.          This list  Adult Large)  Pulse 64  Temp 98.5  F (36.9  C) (Oral)   Wt (!) 328 lb (148.8 kg)  BMI 49.15 kg/m2      Lungs: clear, good air entry, no wheezes, rhonchi or rales.   Cardiac: S1 and S2 normal, no murmurs, regular rate and rhythm.   Abdomen: normal bowel sounds, soft without tenderness, guarding, mass or organomegaly.  Morbidly obese BMI 49  Extremities: show no edema, normal peripheral pulses.   Neurological: Patient does have some peripheral neuropathy in his lower extremities bilaterally  Skin: clear, dry, no rashes/lesions  Psych- normal mood and affect      Pt states an understanding and agrees to the above plan.     is accurate as of 12/5/18 10:52 AM.  Always use your most recent med list.                   Brand Name Dispense Instructions for use Diagnosis    buPROPion 300 MG 24 hr tablet    WELLBUTRIN XL    90 tablet    Take 1 tablet (300 mg) by mouth every morning    Major depressive disorder, recurrent, mild (H)       ibuprofen 200 MG tablet    ADVIL/MOTRIN     Take 600 mg by mouth every 6 hours        MULTI-DAY vitamin  S Tabs      Take  by mouth.        tiZANidine 4 MG tablet    ZANAFLEX    30 tablet    Take 0.5-1 tablets (2-4 mg) by mouth 3 times daily    Strain of neck muscle, initial encounter       TYLENOL ARTHRITIS PAIN PO      Take 650 mg by mouth every 6 hours as needed

## 2021-06-19 NOTE — LETTER
"Letter by Ba Miguel MD at      Author: Ba Miguel MD Service: -- Author Type: --    Filed:  Encounter Date: 6/3/2019 Status: (Other)         Chaparro Guillen  [address] [facility] [address]             Asmita 3, 2019         Dear Mr. Guillen,    Below are the results from your recent visit:    Resulted Orders   Glycosylated Hemoglobin A1c   Result Value Ref Range    Hemoglobin A1c 6.2 (H) 3.5 - 6.0 %   Microalbumin, Random Urine   Result Value Ref Range    Microalbumin, Random Urine <0.50 0.00 - 1.99 mg/dL    Creatinine, Urine 32.4 mg/dL    Microalbumin/Creatinine Ratio Random Urine  <=19.9 mg/g      Comment:      \"Unable to calculate: Creatinine and/or Microalbumin value below detectable level\"    Narrative    Microalbumin, Random Urine  <2.0 mg/dL . . . . . . . . Normal  3.0-30.0 mg/dL . . . . . . Microalbuminuria  >30.0 mg/dL . . . . . .  . Clinical Proteinuria  Microalbumin/Creatinine Ratio, Random Urine  <20 mg/g . . . . .. . . . Normal   mg/g . . . . . . . Microalbuminuria  >300 mg/g . . . . . . . . Clinical Proteinuria   Lipid [place] RANDOM   Result Value Ref Range    Cholesterol 101 <=199 mg/dL    Triglycerides 138 <=149 mg/dL    HDL Cholesterol 44 >=40 mg/dL    LDL Calculated 29 <=129 mg/dL    Patient Fasting > 8hrs? Yes    Comprehensive Metabolic Panel   Result Value Ref Range    Sodium 139 136 - 145 mmol/L    Potassium 3.8 3.5 - 5.0 mmol/L    Chloride 98 98 - 107 mmol/L    CO2 28 22 - 31 mmol/L    Anion Gap, Calculation 13 5 - 18 mmol/L    Glucose 115 70 - 125 mg/dL    BUN 19 8 - 28 mg/dL    Creatinine 1.12 0.70 - 1.30 mg/dL    GFR MDRD Af Amer >60 >60 mL/min/1.73m2    GFR MDRD Non Af Amer >60 >60 mL/min/1.73m2    Bilirubin, Total 0.6 0.0 - 1.0 mg/dL    Calcium 9.6 8.5 - 10.5 mg/dL    Protein, Total 7.2 6.0 - 8.0 g/dL    Albumin 3.8 3.5 - 5.0 g/dL    Alkaline Phosphatase 59 45 - 120 U/L    AST 19 0 - 40 U/L    ALT 28 0 - 45 U/L    Narrative    Fasting " Glucose reference range is 70-99 mg/dL per  American Diabetes Association (ADA) guidelines.   PSA (Prostatic-Specific Antigen), Annual Screen   Result Value Ref Range    PSA 0.6 0.0 - 6.5 ng/mL    Narrative    Method is Abbott Prostate-Specific Antigen (PSA)  Standard-WHO 1st International (90:10)       PSA level has decreased from last year, we will continue to check yearly.   Cholesterol levels are at goal range.   I would like you to decrease your cholesterol medication in half, to only be taking 10 mg daily.   Kidney function and liver function are normal.   No concerns on blood work.     Please call with questions or contact us using [employer].    Sincerely,        Electronically signed by Ba Miguel MD

## 2021-06-19 NOTE — LETTER
Letter by Ba Miguel MD at      Author: Ba Miguel MD Service: -- Author Type: --    Filed:  Encounter Date: 12/9/2019 Status: Signed         Chaparro Guillen  [address] [facility] [address]             December 9, 2019         Dear Mr. Guillen,    Below are the results from your recent visit:    Resulted Orders   Glycosylated Hemoglobin A1c   Result Value Ref Range    Hemoglobin A1c 6.4 (H) 3.5 - 6.0 %   Comprehensive Metabolic Panel   Result Value Ref Range    Sodium 138 136 - 145 mmol/L    Potassium 3.8 3.5 - 5.0 mmol/L    Chloride 97 (L) 98 - 107 mmol/L    CO2 29 22 - 31 mmol/L    Anion Gap, Calculation 12 5 - 18 mmol/L    Glucose 117 70 - 125 mg/dL    BUN 23 8 - 28 mg/dL    Creatinine 1.36 (H) 0.70 - 1.30 mg/dL    GFR MDRD Af Amer >60 >60 mL/min/1.73m2    GFR MDRD Non Af Amer 51 (L) >60 mL/min/1.73m2    Bilirubin, Total 0.7 0.0 - 1.0 mg/dL    Calcium 9.4 8.5 - 10.5 mg/dL    Protein, Total 7.6 6.0 - 8.0 g/dL    Albumin 4.0 3.5 - 5.0 g/dL    Alkaline Phosphatase 65 45 - 120 U/L    AST 18 0 - 40 U/L    ALT 22 0 - 45 U/L    Narrative    Fasting Glucose reference range is 70-99 mg/dL per  American Diabetes Association (ADA) guidelines.   Lipid [place] FASTING   Result Value Ref Range    Cholesterol 104 <=199 mg/dL    Triglycerides 160 (H) <=149 mg/dL    HDL Cholesterol 41 >=40 mg/dL    LDL Calculated 31 <=129 mg/dL    Patient Fasting > 8hrs? Yes    HM2(CBC w/o Differential)   Result Value Ref Range    WBC 10.5 4.0 - 11.0 thou/uL    RBC 5.25 4.40 - 6.20 mill/uL    Hemoglobin 16.0 14.0 - 18.0 g/dL    Hematocrit 48.1 40.0 - 54.0 %    MCV 92 80 - 100 fL    MCH 30.4 27.0 - 34.0 pg    MCHC 33.2 32.0 - 36.0 g/dL    RDW 12.7 11.0 - 14.5 %    Platelets 107 (L) 140 - 440 thou/uL    MPV 9.9 7.0 - 10.0 fL        Liver function is normal.  Kidney function is slightly decreased, I would recommend keeping  well-hydrated and rechecking levels in 2 weeks.    Cholesterol levels are at goal  range.    No other concerns on blood work.     Please call with questions or contact us using [employer].    Sincerely,        Electronically signed by Ba Miguel MD

## 2021-06-20 NOTE — LETTER
Letter by Ba Miguel MD at      Author: Ba Miguel MD Service: -- Author Type: --    Filed:  Encounter Date: 12/26/2019 Status: Signed         Chaparro Guillen  [address] [facility] [address]             December 26, 2019         Dear Mr. Guillen,    Below are the results from your recent visit:    Resulted Orders   Creatinine   Result Value Ref Range    Creatinine 1.29 0.70 - 1.30 mg/dL    GFR MDRD Af Amer >60 >60 mL/min/1.73m2    GFR MDRD Non Af Amer 55 (L) >60 mL/min/1.73m2        Kidney function is stable- continue to keep well hydrated and we will monitor.     Please call with questions or contact us using [employer].    Sincerely,        Electronically signed by Ba Miguel MD

## 2021-06-20 NOTE — LETTER
Letter by Ba Miguel MD at      Author: Ba Miguel MD Service: -- Author Type: --    Filed:  Encounter Date: 8/24/2020 Status: (Other)         Chaparro Guillen  [address] [facility] [address]             August 24, 2020         Dear Mr. Guillen,    Below are the results from your recent visit:    Resulted Orders   Glycosylated Hemoglobin A1c   Result Value Ref Range    Hemoglobin A1c 6.0 (H) <=5.6 %      Comment:      Normal <5.7% Prediabete 5.7-6.4% Diabletes 6.5% or higher - adopted from ADA consensus guidelines   Microalbumin, Random Urine   Result Value Ref Range    Microalbumin, Random Urine 2.71 (H) 0.00 - 1.99 mg/dL    Creatinine, Urine 69.7 mg/dL    Microalbumin/Creatinine Ratio Random Urine 38.9 (H) <=19.9 mg/g    Narrative    Microalbumin, Random Urine  <2.0 mg/dL . . . . . . . . Normal  3.0-30.0 mg/dL . . . . . . Microalbuminuria  >30.0 mg/dL . . . . . .  . Clinical Proteinuria    Microalbumin/Creatinine Ratio, Random Urine  <20 mg/g . . . . .. . . . Normal   mg/g . . . . . . . Microalbuminuria  >300 mg/g . . . . . . . . Clinical Proteinuria       Comprehensive Metabolic Panel   Result Value Ref Range    Sodium 137 136 - 145 mmol/L    Potassium 3.7 3.5 - 5.0 mmol/L    Chloride 98 98 - 107 mmol/L    CO2 25 22 - 31 mmol/L    Anion Gap, Calculation 14 5 - 18 mmol/L    Glucose 108 70 - 125 mg/dL    BUN 18 8 - 28 mg/dL    Creatinine 1.19 0.70 - 1.30 mg/dL    GFR MDRD Af Amer >60 >60 mL/min/1.73m2    GFR MDRD Non Af Amer 60 (L) >60 mL/min/1.73m2    Bilirubin, Total 0.8 0.0 - 1.0 mg/dL    Calcium 9.0 8.5 - 10.5 mg/dL    Protein, Total 7.4 6.0 - 8.0 g/dL    Albumin 3.9 3.5 - 5.0 g/dL    Alkaline Phosphatase 60 45 - 120 U/L    AST 15 0 - 40 U/L    ALT 22 0 - 45 U/L    Narrative    Fasting Glucose reference range is 70-99 mg/dL per  American Diabetes Association (ADA) guidelines.   Lipid [place] FASTING   Result Value Ref Range    Cholesterol 106 <=199 mg/dL     Triglycerides 151 (H) <=149 mg/dL    HDL Cholesterol 41 >=40 mg/dL    LDL Calculated 35 <=129 mg/dL    Patient Fasting > 8hrs? Yes    PSA, Annual Screen (Prostatic-Specific Antigen)   Result Value Ref Range    PSA 0.7 0.0 - 6.5 ng/mL    Narrative    Method is Abbott Prostate-Specific Antigen (PSA)  Standard-WHO 1st International (90:10)         PSA is stable- we will monitor yearly.    Kidney and liver function are normal.Cholesterol levels are at goal range.    No concerns on blood work.     Please call with questions or contact us using [employer].    Sincerely,        Electronically signed by Ba Miguel MD

## 2021-06-22 NOTE — PROGRESS NOTES
ASSESSMENT/PLAN  1. Diabetes mellitus (H)  Hemoglobin A1c at goal range continue his current medications  Recheck renal function  Discussed with patient over the need for diet and exercise for weight loss-he has gained about 50 pounds since earlier this year  Follow-up in 6 months  - Glycosylated Hemoglobin A1c  - Comprehensive Metabolic Panel  - Lipid [place] FASTING    2. Health care maintenance  We will check a PSA level for him today  - PSA, Annual Screen (Prostatic-Specific Antigen)    3. Encounter for screening for malignant neoplasm of prostate   Screen for prostate cancer  - PSA, Annual Screen (Prostatic-Specific Antigen)    4. Mixed hyperlipidemia  Patient is due for lipids  Check cholesterol levels and follow-up based on results he is taking atorvastatin 20 mg daily    5. Morbid obesity (H)  Patient is aware of his weight gain  He is aware of the need for weight loss we discussed diet and exercise    6. Anxiety  Patient feels his current dosing is working well he like to continue with this medication        SUBJECTIVE:   Chief Complaint   Patient presents with     Diabetes     Medication check, fasting labs      Chaparro Guillen 73 y.o. male    Current Outpatient Medications   Medication Sig Dispense Refill     allopurinol (ZYLOPRIM) 300 MG tablet TAKE ONE TABLET BY MOUTH ONCE DAILY  90 tablet 2     atorvastatin (LIPITOR) 20 MG tablet TAKE 1 TABLET BY MOUTH ONCE DAILY  90 tablet 2     blood glucose test (ONETOUCH ULTRA TEST) strips TEST TWICE DAILY. 100 strip 6     blood-glucose meter Misc OneTouch Ultra System w/Device Kit.       furosemide (LASIX) 20 MG tablet TAKE 1 TABLET  BY MOUTH ONCE DAILY 90 tablet 2     generic lancets (ONETOUCH ULTRASOFT) TEST TWICE DAILY 100 each 2     hydroCHLOROthiazide (HYDRODIURIL) 25 MG tablet Take 1 tablet (25 mg total) by mouth daily. 90 tablet 3     LANCETS MISC OneTouch UltraSoft Lancets Miscellaneous. Test twice daily.       lisinopril (PRINIVIL,ZESTRIL) 10 MG tablet  TAKE ONE TABLET BY MOUTH ONE TIME DAILY 90 tablet 1     metFORMIN (GLUCOPHAGE-XR) 500 MG 24 hr tablet TAKE 3 TABLETS BY MOUTH ONCE DAILY WITH BREAKFAST 270 tablet 2     sertraline (ZOLOFT) 25 MG tablet Take 1 tablet (25 mg total) by mouth daily. 90 tablet 2     silver sulfADIAZINE (SILVADENE, SSD) 1 % cream Apply 1 application topically 2 (two) times a day as needed.       No current facility-administered medications for this visit.      Allergies: Patient has no known allergies.   No LMP for male patient.    HPI:   Patient is here for diabetic follow-up.  His hemoglobin A1c returns at goal range however patient is understanding that he is gained a lot of weight from earlier this year.  He is up about 50 pounds.  He is up 30 pounds in the last few months.  He has been eating he states way too much and he understands this.  He has not been exercising.  We discussed maintaining on current medications but the need for weight loss.  We discussed with him the concerns about his other medical conditions if he is not to lose this weight.  He takes a statin for cholesterol control will check cholesterol levels today.  He is been here a lot about prostate cancer he like to have a PSA level tested today.  Patient takes sertraline for anxiety and he feels is controlling his symptoms well.  He does not feel he is having problems with edema-he takes frusemide daily.  We will check kidney function and liver function as well as potassium.    ROS: negative except as per HPI    OBJECTIVE:   The patient appears well, alert, oriented x 3, in no distress.  /75 (Patient Site: Left Arm, Patient Position: Sitting, Cuff Size: Adult Large)   Pulse 66   Temp 97.5  F (36.4  C) (Oral)   Resp 16   Wt (!) 361 lb (163.7 kg)   BMI 54.09 kg/m      Lungs: clear, good air entry, no wheezes, rhonchi or rales.   Cardiac: S1 and S2 normal, no murmurs, regular rate and rhythm.   Abdomen: normal bowel sounds, soft, morbid obesity of  54  Extremities: show trace edema, normal peripheral pulses.   Neurological: normal, no focal findings.  Skin: clear, dry, no rashes/lesions  Psych- normal mood and affect      Pt states an understanding and agrees to the above plan.  Greater than 25 minutes was spent today in interview and examination with Chaparro Guillen with more than 50% of that time in counseling and coordination of care.

## 2021-06-23 NOTE — TELEPHONE ENCOUNTER
Refill Approved    Rx renewed per Medication Renewal Policy. Medication was last renewed on 7/26/2018.    Dominic Fontanez, [facility] Connection Triage/Med Refill 1/23/2019     Requested Prescriptions   Pending Prescriptions Disp Refills     lisinopril (PRINIVIL,ZESTRIL) 10 MG tablet 90 tablet 1     Sig: Take 1 tablet (10 mg total) by mouth daily.    Ace Inhibitors Refill Protocol Passed - 1/23/2019  8:34 AM       Passed - PCP or prescribing provider visit in past 12 months      Last office visit with prescriber/PCP: 11/30/2018 Ba Miguel MD OR same dept: 11/30/2018 Ba Miguel MD OR same specialty: 11/30/2018 Ba Miguel MD  Last physical: 12/15/2017 Last MTM visit: Visit date not found   Next visit within 3 mo: Visit date not found  Next physical within 3 mo: Visit date not found  Prescriber OR PCP: Ba Miguel MD  Last diagnosis associated with med order: 1. HTN (hypertension)  - lisinopril (PRINIVIL,ZESTRIL) 10 MG tablet; Take 1 tablet (10 mg total) by mouth daily.  Dispense: 90 tablet; Refill: 1    If protocol passes may refill for 12 months if within 3 months of last provider visit (or a total of 15 months).            Passed - Serum Potassium in past 12 months    Lab Results   Component Value Date    Potassium 4.2 11/30/2018            Passed - Blood pressure filed in past 12 months    BP Readings from Last 1 Encounters:   11/30/18 132/75            Passed - Serum Creatinine in past 12 months    Creatinine   Date Value Ref Range Status   11/30/2018 1.15 0.70 - 1.30 mg/dL Final

## 2021-06-23 NOTE — TELEPHONE ENCOUNTER
Refill Approved    Rx renewed per Medication Renewal Policy. Medication was last renewed on Atorvastatin=5/1/2018 with 2 refills.  Sertraline=5/2/2018 with 2 refills.  Last office visit:11/30/2018 with PCP Dr JESSICA Miguel.     Simona Singletary, [facility] Triage/Med Refill 1/23/2019     Requested Prescriptions   Pending Prescriptions Disp Refills     sertraline (ZOLOFT) 25 MG tablet 90 tablet 2     Sig: Take 1 tablet (25 mg total) by mouth daily.    SSRI Refill Protocol  Passed - 1/21/2019  5:16 PM       Passed - PCP or prescribing provider visit in last year    Last office visit with prescriber/PCP: 11/30/2018 Ba Miguel MD OR same dept: 11/30/2018 Ba Miguel MD OR same specialty: 11/30/2018 Ba Miguel MD  Last physical: 12/15/2017 Last MTM visit: Visit date not found   Next visit within 3 mo: Visit date not found  Next physical within 3 mo: Visit date not found  Prescriber OR PCP: Ba Miguel MD  Last diagnosis associated with med order: 1. Anxiety  - sertraline (ZOLOFT) 25 MG tablet; Take 1 tablet (25 mg total) by mouth daily.  Dispense: 90 tablet; Refill: 2    2. Dyslipidemia  - atorvastatin (LIPITOR) 20 MG tablet; Take 1 tablet (20 mg total) by mouth daily.  Dispense: 90 tablet; Refill: 2    If protocol passes may refill for 12 months if within 3 months of last provider visit (or a total of 15 months).             atorvastatin (LIPITOR) 20 MG tablet 90 tablet 2     Sig: Take 1 tablet (20 mg total) by mouth daily.    Statins Refill Protocol (Hmg CoA Reductase Inhibitors) Passed - 1/21/2019  5:16 PM       Passed - PCP or prescribing provider visit in past 12 months     Last office visit with prescriber/PCP: 11/30/2018 Ba Miguel MD OR same dept: 11/30/2018 Ba Miguel MD OR same specialty: 11/30/2018 Ba Miguel MD  Last physical: 12/15/2017 Last MTM visit: Visit date not found   Next visit within 3 mo: Visit date not found  Next  physical within 3 mo: Visit date not found  Prescriber OR PCP: Ba Miguel MD  Last diagnosis associated with med order: 1. Anxiety  - sertraline (ZOLOFT) 25 MG tablet; Take 1 tablet (25 mg total) by mouth daily.  Dispense: 90 tablet; Refill: 2    2. Dyslipidemia  - atorvastatin (LIPITOR) 20 MG tablet; Take 1 tablet (20 mg total) by mouth daily.  Dispense: 90 tablet; Refill: 2    If protocol passes may refill for 12 months if within 3 months of last provider visit (or a total of 15 months).

## 2021-06-23 NOTE — TELEPHONE ENCOUNTER
Transmission to pharmacy failed during last prescription attempt, resent electronically to pharmacy.    Maria Fernanda Epstein RN  [facility] Medication Refill Nurse  1/25/2019  8:25 AM

## 2021-06-26 ENCOUNTER — COMMUNICATION - HEALTHEAST (OUTPATIENT)
Dept: FAMILY MEDICINE | Facility: CLINIC | Age: 76
End: 2021-06-26

## 2021-06-26 DIAGNOSIS — E11.9 TYPE 2 DIABETES MELLITUS WITHOUT COMPLICATION, WITHOUT LONG-TERM CURRENT USE OF INSULIN (H): ICD-10-CM

## 2021-06-26 RX ORDER — METFORMIN HCL 500 MG
TABLET, EXTENDED RELEASE 24 HR ORAL
Qty: 270 TABLET | Refills: 2 | Status: SHIPPED | OUTPATIENT
Start: 2021-06-26 | End: 2022-03-23

## 2021-07-07 NOTE — TELEPHONE ENCOUNTER
Refill Approved    Rx renewed per Medication Renewal Policy. Medication was last renewed on 7/12//20, last OV 2/24/21    Clarisa Rivers, [facility] Triage/Med Refill 6/26/2021     Requested Prescriptions   Pending Prescriptions Disp Refills     metFORMIN (GLUCOPHAGE-XR) 500 MG 24 hr tablet [Pharmacy Med Name: metFORMIN HCl ER Oral Tablet Extended Release 24 Hour 500 MG] 270 tablet 0     Sig: TAKE 3 TABLETS BY MOUTH ONCE DAILY WITH BREAKFAST       Metformin Refill Protocol Passed - 6/26/2021  2:01 AM        Passed - Blood pressure in last 12 months     BP Readings from Last 1 Encounters:   02/24/21 126/80             Passed - LFT or AST or ALT in last 12 months     Albumin   Date Value Ref Range Status   02/24/2021 4.0 3.5 - 5.0 g/dL Final     Bilirubin, Total   Date Value Ref Range Status   02/24/2021 0.9 0.0 - 1.0 mg/dL Final     Alkaline Phosphatase   Date Value Ref Range Status   02/24/2021 57 45 - 120 U/L Final     AST   Date Value Ref Range Status   02/24/2021 16 0 - 40 U/L Final     ALT   Date Value Ref Range Status   02/24/2021 23 0 - 45 U/L Final     Protein, Total   Date Value Ref Range Status   02/24/2021 7.5 6.0 - 8.0 g/dL Final                Passed - GFR or Serum Creatinine in last 6 months     GFR MDRD Non Af Amer   Date Value Ref Range Status   02/24/2021 >60 >60 mL/min/1.73m2 Final     GFR MDRD Af Amer   Date Value Ref Range Status   02/24/2021 >60 >60 mL/min/1.73m2 Final             Passed - Visit with PCP or prescribing provider visit in last 6 months or next 3 months     Last office visit with prescriber/PCP: 2/24/2021 OR same dept: 2/24/2021 Ba Miguel MD OR same specialty: 2/24/2021 Ba Miguel MD Last physical: Visit date not found Last MTM visit: Visit date not found         Next appt within 3 mo: Visit date not found  Next physical within 3 mo: Visit date not found  Prescriber OR PCP: Ba Miguel MD  Last diagnosis associated with med order: 1. Type 2  diabetes mellitus without complication, without long-term current use of insulin (H)  - metFORMIN (GLUCOPHAGE-XR) 500 MG 24 hr tablet [Pharmacy Med Name: metFORMIN HCl ER Oral Tablet Extended Release 24 Hour 500 MG]; TAKE 3 TABLETS BY MOUTH ONCE DAILY WITH BREAKFAST  Dispense: 270 tablet; Refill: 0     If protocol passes may refill for 12 months if within 3 months of last provider visit (or a total of 15 months).           Passed - A1C in last 6 months     Hemoglobin A1c   Date Value Ref Range Status   02/24/2021 5.9 (H) <=5.6 % Final               Passed - Microalbumin in last year      Microalbumin, Random Urine   Date Value Ref Range Status   08/21/2020 2.71 (H) 0.00 - 1.99 mg/dL Final

## 2021-08-27 ENCOUNTER — OFFICE VISIT (OUTPATIENT)
Dept: FAMILY MEDICINE | Facility: CLINIC | Age: 76
End: 2021-08-27
Payer: COMMERCIAL

## 2021-08-27 VITALS
RESPIRATION RATE: 24 BRPM | SYSTOLIC BLOOD PRESSURE: 110 MMHG | DIASTOLIC BLOOD PRESSURE: 79 MMHG | HEART RATE: 72 BPM | BODY MASS INDEX: 50.41 KG/M2 | WEIGHT: 315 LBS

## 2021-08-27 DIAGNOSIS — E11.9 TYPE 2 DIABETES MELLITUS WITHOUT COMPLICATION, WITHOUT LONG-TERM CURRENT USE OF INSULIN (H): Primary | ICD-10-CM

## 2021-08-27 DIAGNOSIS — E66.01 MORBID OBESITY (H): ICD-10-CM

## 2021-08-27 DIAGNOSIS — Z00.00 HEALTH CARE MAINTENANCE: ICD-10-CM

## 2021-08-27 DIAGNOSIS — E78.2 MIXED HYPERLIPIDEMIA: ICD-10-CM

## 2021-08-27 DIAGNOSIS — I10 ESSENTIAL HYPERTENSION: ICD-10-CM

## 2021-08-27 DIAGNOSIS — Z12.5 SCREENING FOR PROSTATE CANCER: ICD-10-CM

## 2021-08-27 LAB
ALBUMIN SERPL-MCNC: 3.9 G/DL (ref 3.5–5)
ALP SERPL-CCNC: 54 U/L (ref 45–120)
ALT SERPL W P-5'-P-CCNC: 23 U/L (ref 0–45)
ANION GAP SERPL CALCULATED.3IONS-SCNC: 14 MMOL/L (ref 5–18)
AST SERPL W P-5'-P-CCNC: 18 U/L (ref 0–40)
BILIRUB SERPL-MCNC: 0.9 MG/DL (ref 0–1)
BUN SERPL-MCNC: 16 MG/DL (ref 8–28)
CALCIUM SERPL-MCNC: 9.1 MG/DL (ref 8.5–10.5)
CHLORIDE BLD-SCNC: 100 MMOL/L (ref 98–107)
CHOLEST SERPL-MCNC: 93 MG/DL
CO2 SERPL-SCNC: 24 MMOL/L (ref 22–31)
CREAT SERPL-MCNC: 1.15 MG/DL (ref 0.7–1.3)
CREAT UR-MCNC: 48 MG/DL
ERYTHROCYTE [DISTWIDTH] IN BLOOD BY AUTOMATED COUNT: 13.9 % (ref 10–15)
FASTING STATUS PATIENT QL REPORTED: YES
GFR SERPL CREATININE-BSD FRML MDRD: 62 ML/MIN/1.73M2
GLUCOSE BLD-MCNC: 113 MG/DL (ref 70–125)
HBA1C MFR BLD: 5.9 % (ref 0–5.6)
HCT VFR BLD AUTO: 46.9 % (ref 40–53)
HDLC SERPL-MCNC: 43 MG/DL
HGB BLD-MCNC: 15.7 G/DL (ref 13.3–17.7)
LDLC SERPL CALC-MCNC: 26 MG/DL
MCH RBC QN AUTO: 29.4 PG (ref 26.5–33)
MCHC RBC AUTO-ENTMCNC: 33.5 G/DL (ref 31.5–36.5)
MCV RBC AUTO: 88 FL (ref 78–100)
MICROALBUMIN UR-MCNC: <0.5 MG/DL (ref 0–1.99)
MICROALBUMIN/CREAT UR: NORMAL MG/G{CREAT}
PLATELET # BLD AUTO: 277 10E3/UL (ref 150–450)
POTASSIUM BLD-SCNC: 4.2 MMOL/L (ref 3.5–5)
PROT SERPL-MCNC: 7.3 G/DL (ref 6–8)
PSA SERPL-MCNC: 0.82 UG/L (ref 0–6.5)
RBC # BLD AUTO: 5.34 10E6/UL (ref 4.4–5.9)
SODIUM SERPL-SCNC: 138 MMOL/L (ref 136–145)
TRIGL SERPL-MCNC: 118 MG/DL
TSH SERPL DL<=0.005 MIU/L-ACNC: 3.42 UIU/ML (ref 0.3–5)
WBC # BLD AUTO: 9.5 10E3/UL (ref 4–11)

## 2021-08-27 PROCEDURE — 85027 COMPLETE CBC AUTOMATED: CPT | Performed by: FAMILY MEDICINE

## 2021-08-27 PROCEDURE — G0103 PSA SCREENING: HCPCS | Performed by: FAMILY MEDICINE

## 2021-08-27 PROCEDURE — 80053 COMPREHEN METABOLIC PANEL: CPT | Performed by: FAMILY MEDICINE

## 2021-08-27 PROCEDURE — 83036 HEMOGLOBIN GLYCOSYLATED A1C: CPT | Performed by: FAMILY MEDICINE

## 2021-08-27 PROCEDURE — 36415 COLL VENOUS BLD VENIPUNCTURE: CPT | Performed by: FAMILY MEDICINE

## 2021-08-27 PROCEDURE — 80061 LIPID PANEL: CPT | Performed by: FAMILY MEDICINE

## 2021-08-27 PROCEDURE — 99213 OFFICE O/P EST LOW 20 MIN: CPT | Performed by: FAMILY MEDICINE

## 2021-08-27 PROCEDURE — 84443 ASSAY THYROID STIM HORMONE: CPT | Performed by: FAMILY MEDICINE

## 2021-08-27 PROCEDURE — 82043 UR ALBUMIN QUANTITATIVE: CPT | Performed by: FAMILY MEDICINE

## 2021-08-27 NOTE — PROGRESS NOTES
Assessment & Plan     Type 2 diabetes mellitus without complication, without long-term current use of insulin (H)  A1c at goal range at 5.9%  Recommend 6-month follow-up  Maintain on current medications  Discussed diet and exercise need for weight loss  - MICROALBUMIN    Health care maintenance  Obtain fasting blood work today follow-up based on results  - PSA, screen; Future  - Comprehensive metabolic panel (BMP + Alb, Alk Phos, ALT, AST, Total. Bili, TP); Future  - Lipid Profile (Chol, Trig, HDL, LDL calc); Future  - CBC with platelets; Future    Screening for prostate cancer  Monitor PSA levels  - PSA, screen; Future    Mixed hyperlipidemia  Check cholesterol levels patient is on statin therapy goal LDL below 100    Essential hypertension  Blood pressure goal range check kidney function and potassium   Morbid obesity (H)  Patient aware of his weight  Check thyroid levels today  Discussed diet and exercise  - TSH with free T4 reflex; Future        No follow-ups on file.    Ba Miguel MD  [facility]    Mario Mayfield is a 76 year old who presents for the following health issues     HPI   76-year-old male here for medication follow-up and diabetes.  Patient's A1c returns at 5.9%.  We will have him continue with current dosing.  Patient's blood pressure is at goal range.  Check electrolytes and kidney function.  He is on statin therapy goal LDL below 100.  We will check fasting labs today.  We just discussed patient's weight and the need for weight loss.  Discussed Covid and possible third dose in the near future.  Discussed Covid pandemic.  We will follow based on laboratory results.  Patient is aware of his weight.  We discussed diet and exercise.      Review of Systems   Constitutional, HEENT, cardiovascular, pulmonary, gi and gu systems are negative, except as otherwise noted.      Objective    /79 (BP Location: Left arm, Patient Position: Sitting, Cuff Size:  Adult Large)   Pulse 72   Resp 24   Wt (!) 152.6 kg (336 lb 6.4 oz)   BMI 50.41 kg/m    Body mass index is 50.41 kg/m .  Physical Exam   GENERAL: healthy, alert and no distress  EYES: Eyes grossly normal to inspection, PERRL and conjunctivae and sclerae normal  HENT: ear canals and TM's normal, nose and mouth without ulcers or lesions  RESP: lungs clear to auscultation - no rales, rhonchi or wheezes  CV: regular rate and rhythm, normal S1 S2, no S3 or S4, no murmur, click or rub, no peripheral edema and peripheral pulses strong  ABDOMEN: bowel sounds normal and obese  MS: 1 + edema BL legs  PSYCH: mentation appears normal, affect normal/bright

## 2021-08-27 NOTE — LETTER
September 1, 2021      Chaparro Guillen  [address] [id number]        Dear ,    We are writing to inform you of your test results.      Resulted Orders   Hemoglobin A1c   Result Value Ref Range    Hemoglobin A1C 5.9 (H) 0.0 - 5.6 %      Comment:      Normal <5.7%   Prediabetes 5.7-6.4%    Diabetes 6.5% or higher     Note: Adopted from ADA consensus guidelines.   Albumin Random Urine Quantitative with Creat Ratio   Result Value Ref Range    Microalbumin Urine mg/dL <0.50 0.00 - 1.99 mg/dL    Creatinine Urine mg/dL 48 mg/dL    Microalbumin Urine mg/g Cr        Comment:      Unable to calculate:  Urine creatinine or microalbumin value below detectable level    Narrative    Microalbumin, Random Urine   <2.0 mg/dL . . . . . . . . Normal   3.0-30.0 mg/dL . . . . . . Microalbuminuria   >30.0 mg/dL . . . . . .  . Clinical Proteinuria     Microalbumin/Creatinine Ratio, Random Urine   <20 mg/g . . . . .. . . . Normal    mg/g . . . . . . . Microalbuminuria   >300 mg/g . . . . . . . . Clinical Proteinuria   PSA, screen   Result Value Ref Range    Prostate Specific Antigen Screen 0.82 0.00 - 6.50 ug/L   Comprehensive metabolic panel (BMP + Alb, Alk Phos, ALT, AST, Total. Bili, TP)   Result Value Ref Range    Sodium 138 136 - 145 mmol/L    Potassium 4.2 3.5 - 5.0 mmol/L    Chloride 100 98 - 107 mmol/L    Carbon Dioxide (CO2) 24 22 - 31 mmol/L    Anion Gap 14 5 - 18 mmol/L    Urea Nitrogen 16 8 - 28 mg/dL    Creatinine 1.15 0.70 - 1.30 mg/dL    Calcium 9.1 8.5 - 10.5 mg/dL    Glucose 113 70 - 125 mg/dL    Alkaline Phosphatase 54 45 - 120 U/L    AST 18 0 - 40 U/L    ALT 23 0 - 45 U/L    Protein Total 7.3 6.0 - 8.0 g/dL    Albumin 3.9 3.5 - 5.0 g/dL    Bilirubin Total 0.9 0.0 - 1.0 mg/dL    GFR Estimate 62 >60 mL/min/1.73m2      Comment:      As of July 11, 2021, eGFR is calculated by the CKD-EPI creatinine equation, without race adjustment. eGFR can be influenced by muscle mass,  exercise, and diet. The reported eGFR is an estimation only and is only applicable if the renal function is stable.   Lipid Profile (Chol, Trig, HDL, LDL calc)   Result Value Ref Range    Cholesterol 93 <=199 mg/dL    Triglycerides 118 <=149 mg/dL    Direct Measure HDL 43 >=40 mg/dL      Comment:      HDL Cholesterol Reference Range:     0-2 years:   No reference ranges established for patients under 2 years old  at [facility] for lipid analytes.    2-8 years:  Greater than 45 mg/dL     18 years and older:   Female: Greater than or equal to 50 mg/dL   Male:   Greater than or equal to 40 mg/dL    LDL Cholesterol Calculated 26 <=129 mg/dL    Patient Fasting > 8hrs? Yes    CBC with platelets   Result Value Ref Range    WBC Count 9.5 4.0 - 11.0 10e3/uL    RBC Count 5.34 4.40 - 5.90 10e6/uL    Hemoglobin 15.7 13.3 - 17.7 g/dL    Hematocrit 46.9 40.0 - 53.0 %    MCV 88 78 - 100 fL    MCH 29.4 26.5 - 33.0 pg    MCHC 33.5 31.5 - 36.5 g/dL    RDW 13.9 10.0 - 15.0 %    Platelet Count 277 150 - 450 10e3/uL   TSH with free T4 reflex   Result Value Ref Range    TSH 3.42 0.30 - 5.00 uIU/mL     PSA levels are stable- we will monitor yearly.   Thyroid levels are normal.   Kidney and liver function are normal.   Cholesterol levels are at goal range.   No concerns on blood work.       If you have any questions or concerns, please call the clinic at the number listed above.       Sincerely,      Ba Miguel MD

## 2021-09-05 DIAGNOSIS — E78.2 MIXED HYPERLIPIDEMIA: ICD-10-CM

## 2021-09-05 DIAGNOSIS — I10 ESSENTIAL HYPERTENSION: ICD-10-CM

## 2021-09-05 DIAGNOSIS — I10 HTN (HYPERTENSION): ICD-10-CM

## 2021-09-05 RX ORDER — HYDROCHLOROTHIAZIDE 25 MG/1
25 TABLET ORAL DAILY
Qty: 90 TABLET | Refills: 3 | Status: SHIPPED | OUTPATIENT
Start: 2021-09-05 | End: 2022-08-18

## 2021-09-05 RX ORDER — LISINOPRIL 10 MG/1
10 TABLET ORAL DAILY
Qty: 90 TABLET | Refills: 3 | Status: SHIPPED | OUTPATIENT
Start: 2021-09-05 | End: 2022-08-18

## 2021-09-05 RX ORDER — ATORVASTATIN CALCIUM 10 MG/1
TABLET, FILM COATED ORAL
Qty: 90 TABLET | Refills: 3 | Status: SHIPPED | OUTPATIENT
Start: 2021-09-05 | End: 2022-08-18

## 2021-09-05 NOTE — TELEPHONE ENCOUNTER
"Last Written Prescription Date:  9/8/20  Last Fill Quantity: 90,  # refills: 3   Last office visit provider:  8/27/21     Last Written Prescription Date:  12/22/20  Last Fill Quantity: 90,  # refills: 2   Last office visit provider:  8/27/21     Requested Prescriptions   Pending Prescriptions Disp Refills     atorvastatin (LIPITOR) 10 MG tablet [Pharmacy Med Name: Atorvastatin Calcium Oral Tablet 10 MG] 90 tablet 0     Sig: TAKE 1 TABLET (10 MG) BY MOUTH ONCE DAILY       Statins Protocol Passed - 9/5/2021  2:01 AM        Passed - LDL on file in past 12 months     Recent Labs   Lab Test 08/27/21  0840   LDL 26             Passed - No abnormal creatine kinase in past 12 months     No lab results found.             Passed - Recent (12 mo) or future (30 days) visit within the authorizing provider's specialty     Patient has had an office visit with the authorizing provider or a provider within the authorizing providers department within the previous 12 mos or has a future within next 30 days. See \"Patient Info\" tab in [employer], or \"Choose Columns\" in Meds & Orders section of the refill encounter.              Passed - Medication is active on med list        Passed - Patient is age 18 or older           hydrochlorothiazide (HYDRODIURIL) 25 MG tablet [Pharmacy Med Name: hydroCHLOROthiazide Oral Tablet 25 MG] 90 tablet 0     Sig: Take 1 tablet  by mouth once daily       Diuretics (Including Combos) Protocol Passed - 9/5/2021  2:01 AM        Passed - Blood pressure under 140/90 in past 12 months     BP Readings from Last 3 Encounters:   08/27/21 110/79   02/24/21 126/80   08/21/20 115/72                 Passed - Recent (12 mo) or future (30 days) visit within the authorizing provider's specialty     Patient has had an office visit with the authorizing provider or a provider within the authorizing providers department within the previous 12 mos or has a future within next 30 days. See \"Patient Info\" tab in [employer], or " "\"Choose Columns\" in Meds & Orders section of the refill encounter.              Passed - Medication is active on med list        Passed - Patient is age 18 or older        Passed - Normal serum creatinine on file in past 12 months     Recent Labs   Lab Test 08/27/21  0840   CR 1.15              Passed - Normal serum potassium on file in past 12 months     Recent Labs   Lab Test 08/27/21  0840   POTASSIUM 4.2                    Passed - Normal serum sodium on file in past 12 months     Recent Labs   Lab Test 08/27/21  0840                    lisinopril (ZESTRIL) 10 MG tablet [Pharmacy Med Name: Lisinopril Oral Tablet 10 MG] 90 tablet 0     Sig: Take 1 tablet  by mouth once a day       ACE Inhibitors (Including Combos) Protocol Passed - 9/5/2021  2:01 AM        Passed - Blood pressure under 140/90 in past 12 months     BP Readings from Last 3 Encounters:   08/27/21 110/79   02/24/21 126/80   08/21/20 115/72                 Passed - Recent (12 mo) or future (30 days) visit within the authorizing provider's specialty     Patient has had an office visit with the authorizing provider or a provider within the authorizing providers department within the previous 12 mos or has a future within next 30 days. See \"Patient Info\" tab in inbasket, or \"Choose Columns\" in Meds & Orders section of the refill encounter.              Passed - Medication is active on med list        Passed - Patient is age 18 or older        Passed - Normal serum creatinine on file in past 12 months     Recent Labs   Lab Test 08/27/21  0840   CR 1.15       Ok to refill medication if creatinine is low          Passed - Normal serum potassium on file in past 12 months     Recent Labs   Lab Test 08/27/21  0840   POTASSIUM 4.2                  Isidro Cordon RN 09/05/21 12:12 PM  "

## 2021-09-29 ENCOUNTER — TRANSFERRED RECORDS (OUTPATIENT)
Dept: HEALTH INFORMATION MANAGEMENT | Facility: CLINIC | Age: 76
End: 2021-09-29

## 2021-11-09 ENCOUNTER — TRANSFERRED RECORDS (OUTPATIENT)
Dept: HEALTH INFORMATION MANAGEMENT | Facility: CLINIC | Age: 76
End: 2021-11-09
Payer: COMMERCIAL

## 2021-11-11 ENCOUNTER — TRANSFERRED RECORDS (OUTPATIENT)
Dept: HEALTH INFORMATION MANAGEMENT | Facility: CLINIC | Age: 76
End: 2021-11-11
Payer: COMMERCIAL

## 2021-11-11 LAB — RETINOPATHY: POSITIVE

## 2022-01-21 DIAGNOSIS — F41.9 ANXIETY: ICD-10-CM

## 2022-01-21 DIAGNOSIS — R60.9 EDEMA, UNSPECIFIED TYPE: ICD-10-CM

## 2022-01-21 DIAGNOSIS — Z79.899 MEDICATION MANAGEMENT: ICD-10-CM

## 2022-01-23 RX ORDER — FUROSEMIDE 20 MG
TABLET ORAL
Qty: 90 TABLET | Refills: 2 | Status: SHIPPED | OUTPATIENT
Start: 2022-01-23 | End: 2022-10-15

## 2022-01-23 RX ORDER — SERTRALINE HYDROCHLORIDE 25 MG/1
TABLET, FILM COATED ORAL
Qty: 90 TABLET | Refills: 2 | Status: SHIPPED | OUTPATIENT
Start: 2022-01-23 | End: 2022-10-15

## 2022-01-23 NOTE — TELEPHONE ENCOUNTER
"Routing refill request to provider for review/approval because:  Labs not current:  Uric acid    Last Written Prescription Date:  9/23/21  Last Fill Quantity: 90,  # refills: 0   Last office visit provider:  8/27/21     Requested Prescriptions   Pending Prescriptions Disp Refills     allopurinol (ZYLOPRIM) 300 MG tablet [Pharmacy Med Name: Allopurinol Oral Tablet 300 MG] 90 tablet 0     Sig: Take 1 tablet (300 mg) by mouth once daily       Gout Agents Protocol Failed - 1/21/2022 10:17 AM        Failed - Has Uric Acid on file in past 12 months and value is less than 6     No lab results found.  If level is 6mg/dL or greater, ok to refill one time and refer to provider.           Passed - CBC on file in past 12 months     Recent Labs   Lab Test 08/27/21  0840   WBC 9.5   RBC 5.34   HGB 15.7   HCT 46.9                    Passed - ALT on file in past 12 months     Recent Labs   Lab Test 08/27/21  0840   ALT 23             Passed - Recent (12 mo) or future (30 days) visit within the authorizing provider's specialty     Patient has had an office visit with the authorizing provider or a provider within the authorizing providers department within the previous 12 mos or has a future within next 30 days. See \"Patient Info\" tab in inbasket, or \"Choose Columns\" in Meds & Orders section of the refill encounter.              Passed - Medication is active on med list        Passed - Patient is age 18 or older        Passed - Normal serum creatinine on file in the past 12 months     Recent Labs   Lab Test 08/27/21  0840   CR 1.15       Ok to refill medication if creatinine is low           Signed Prescriptions Disp Refills    sertraline (ZOLOFT) 25 MG tablet 90 tablet 2     Sig: Take 1 tablet (25 mg total) by mouth ONCE daily.       SSRIs Protocol Passed - 1/21/2022 10:17 AM        Passed - Recent (12 mo) or future (30 days) visit within the authorizing provider's specialty     Patient has had an office visit with the " "authorizing provider or a provider within the authorizing providers department within the previous 12 mos or has a future within next 30 days. See \"Patient Info\" tab in inbasket, or \"Choose Columns\" in Meds & Orders section of the refill encounter.              Passed - Medication is active on med list        Passed - Patient is age 18 or older          furosemide (LASIX) 20 MG tablet 90 tablet 2     Sig: Take 1 tablet (20 mg) by mouth once a day       Diuretics (Including Combos) Protocol Passed - 1/21/2022 10:17 AM        Passed - Blood pressure under 140/90 in past 12 months     BP Readings from Last 3 Encounters:   08/27/21 110/79   02/24/21 126/80   08/21/20 115/72                 Passed - Recent (12 mo) or future (30 days) visit within the authorizing provider's specialty     Patient has had an office visit with the authorizing provider or a provider within the authorizing providers department within the previous 12 mos or has a future within next 30 days. See \"Patient Info\" tab in [employer], or \"Choose Columns\" in Meds & Orders section of the refill encounter.              Passed - Medication is active on med list        Passed - Patient is age 18 or older        Passed - Normal serum creatinine on file in past 12 months     Recent Labs   Lab Test 08/27/21  0840   CR 1.15              Passed - Normal serum potassium on file in past 12 months     Recent Labs   Lab Test 08/27/21  0840   POTASSIUM 4.2                    Passed - Normal serum sodium on file in past 12 months     Recent Labs   Lab Test 08/27/21  0840                      Isidro Cordon RN 01/23/22 10:22 AM  "

## 2022-01-23 NOTE — TELEPHONE ENCOUNTER
"Last Written Prescription Date:  9/23/21  Last Fill Quantity: 90,  # refills: 0   Last office visit provider:  8/27/21     Requested Prescriptions   Pending Prescriptions Disp Refills     sertraline (ZOLOFT) 25 MG tablet [Pharmacy Med Name: Sertraline HCl Oral Tablet 25 MG] 90 tablet 0     Sig: Take 1 tablet (25 mg total) by mouth ONCE daily.       SSRIs Protocol Passed - 1/21/2022 10:17 AM        Passed - Recent (12 mo) or future (30 days) visit within the authorizing provider's specialty     Patient has had an office visit with the authorizing provider or a provider within the authorizing providers department within the previous 12 mos or has a future within next 30 days. See \"Patient Info\" tab in inbasket, or \"Choose Columns\" in Meds & Orders section of the refill encounter.              Passed - Medication is active on med list        Passed - Patient is age 18 or older           allopurinol (ZYLOPRIM) 300 MG tablet [Pharmacy Med Name: Allopurinol Oral Tablet 300 MG] 90 tablet 0     Sig: Take 1 tablet (300 mg) by mouth once daily       Gout Agents Protocol Failed - 1/21/2022 10:17 AM        Failed - Has Uric Acid on file in past 12 months and value is less than 6     No lab results found.  If level is 6mg/dL or greater, ok to refill one time and refer to provider.           Passed - CBC on file in past 12 months     Recent Labs   Lab Test 08/27/21  0840   WBC 9.5   RBC 5.34   HGB 15.7   HCT 46.9                    Passed - ALT on file in past 12 months     Recent Labs   Lab Test 08/27/21  0840   ALT 23             Passed - Recent (12 mo) or future (30 days) visit within the authorizing provider's specialty     Patient has had an office visit with the authorizing provider or a provider within the authorizing providers department within the previous 12 mos or has a future within next 30 days. See \"Patient Info\" tab in inbasket, or \"Choose Columns\" in Meds & Orders section of the refill encounter.          " "    Passed - Medication is active on med list        Passed - Patient is age 18 or older        Passed - Normal serum creatinine on file in the past 12 months     Recent Labs   Lab Test 08/27/21  0840   CR 1.15       Ok to refill medication if creatinine is low             furosemide (LASIX) 20 MG tablet [Pharmacy Med Name: Furosemide Oral Tablet 20 MG] 90 tablet 0     Sig: Take 1 tablet (20 mg) by mouth once a day       Diuretics (Including Combos) Protocol Passed - 1/21/2022 10:17 AM        Passed - Blood pressure under 140/90 in past 12 months     BP Readings from Last 3 Encounters:   08/27/21 110/79   02/24/21 126/80   08/21/20 115/72                 Passed - Recent (12 mo) or future (30 days) visit within the authorizing provider's specialty     Patient has had an office visit with the authorizing provider or a provider within the authorizing providers department within the previous 12 mos or has a future within next 30 days. See \"Patient Info\" tab in inbasket, or \"Choose Columns\" in Meds & Orders section of the refill encounter.              Passed - Medication is active on med list        Passed - Patient is age 18 or older        Passed - Normal serum creatinine on file in past 12 months     Recent Labs   Lab Test 08/27/21  0840   CR 1.15              Passed - Normal serum potassium on file in past 12 months     Recent Labs   Lab Test 08/27/21  0840   POTASSIUM 4.2                    Passed - Normal serum sodium on file in past 12 months     Recent Labs   Lab Test 08/27/21  0840                      Isidro Cordon RN 01/23/22 10:21 AM  "

## 2022-01-24 RX ORDER — ALLOPURINOL 300 MG/1
TABLET ORAL
Qty: 90 TABLET | Refills: 0 | Status: SHIPPED | OUTPATIENT
Start: 2022-01-24 | End: 2022-04-22

## 2022-03-02 ENCOUNTER — OFFICE VISIT (OUTPATIENT)
Dept: FAMILY MEDICINE | Facility: CLINIC | Age: 77
End: 2022-03-02
Payer: COMMERCIAL

## 2022-03-02 ENCOUNTER — TELEPHONE (OUTPATIENT)
Dept: FAMILY MEDICINE | Facility: CLINIC | Age: 77
End: 2022-03-02

## 2022-03-02 VITALS
HEIGHT: 69 IN | DIASTOLIC BLOOD PRESSURE: 63 MMHG | HEART RATE: 43 BPM | WEIGHT: 314.13 LBS | BODY MASS INDEX: 46.53 KG/M2 | SYSTOLIC BLOOD PRESSURE: 122 MMHG | RESPIRATION RATE: 16 BRPM

## 2022-03-02 DIAGNOSIS — E66.01 MORBID OBESITY (H): ICD-10-CM

## 2022-03-02 DIAGNOSIS — F41.9 ANXIETY: ICD-10-CM

## 2022-03-02 DIAGNOSIS — Z00.00 HEALTH CARE MAINTENANCE: Primary | ICD-10-CM

## 2022-03-02 DIAGNOSIS — E11.9 TYPE 2 DIABETES MELLITUS WITHOUT COMPLICATION, WITHOUT LONG-TERM CURRENT USE OF INSULIN (H): ICD-10-CM

## 2022-03-02 DIAGNOSIS — E78.2 MIXED HYPERLIPIDEMIA: ICD-10-CM

## 2022-03-02 DIAGNOSIS — I10 PRIMARY HYPERTENSION: ICD-10-CM

## 2022-03-02 DIAGNOSIS — R00.1 BRADYCARDIA: ICD-10-CM

## 2022-03-02 LAB
ALBUMIN SERPL-MCNC: 3.6 G/DL (ref 3.5–5)
ALP SERPL-CCNC: 62 U/L (ref 45–120)
ALT SERPL W P-5'-P-CCNC: 15 U/L (ref 0–45)
ANION GAP SERPL CALCULATED.3IONS-SCNC: 12 MMOL/L (ref 5–18)
AST SERPL W P-5'-P-CCNC: 14 U/L (ref 0–40)
BILIRUB SERPL-MCNC: 1 MG/DL (ref 0–1)
BUN SERPL-MCNC: 17 MG/DL (ref 8–28)
CALCIUM SERPL-MCNC: 9.1 MG/DL (ref 8.5–10.5)
CHLORIDE BLD-SCNC: 99 MMOL/L (ref 98–107)
CHOLEST SERPL-MCNC: 103 MG/DL
CO2 SERPL-SCNC: 26 MMOL/L (ref 22–31)
CREAT SERPL-MCNC: 0.95 MG/DL (ref 0.7–1.3)
ERYTHROCYTE [DISTWIDTH] IN BLOOD BY AUTOMATED COUNT: 14.6 % (ref 10–15)
FASTING STATUS PATIENT QL REPORTED: YES
GFR SERPL CREATININE-BSD FRML MDRD: 83 ML/MIN/1.73M2
GLUCOSE BLD-MCNC: 114 MG/DL (ref 70–125)
HBA1C MFR BLD: 5.8 % (ref 0–5.6)
HCT VFR BLD AUTO: 46.8 % (ref 40–53)
HDLC SERPL-MCNC: 41 MG/DL
HGB BLD-MCNC: 15.7 G/DL (ref 13.3–17.7)
LDLC SERPL CALC-MCNC: 38 MG/DL
MCH RBC QN AUTO: 29.3 PG (ref 26.5–33)
MCHC RBC AUTO-ENTMCNC: 33.5 G/DL (ref 31.5–36.5)
MCV RBC AUTO: 87 FL (ref 78–100)
PLATELET # BLD AUTO: 270 10E3/UL (ref 150–450)
POTASSIUM BLD-SCNC: 3.9 MMOL/L (ref 3.5–5)
PROT SERPL-MCNC: 7.2 G/DL (ref 6–8)
RBC # BLD AUTO: 5.36 10E6/UL (ref 4.4–5.9)
SODIUM SERPL-SCNC: 137 MMOL/L (ref 136–145)
TRIGL SERPL-MCNC: 118 MG/DL
WBC # BLD AUTO: 10.4 10E3/UL (ref 4–11)

## 2022-03-02 PROCEDURE — 36415 COLL VENOUS BLD VENIPUNCTURE: CPT | Performed by: FAMILY MEDICINE

## 2022-03-02 PROCEDURE — 80053 COMPREHEN METABOLIC PANEL: CPT | Performed by: FAMILY MEDICINE

## 2022-03-02 PROCEDURE — 83036 HEMOGLOBIN GLYCOSYLATED A1C: CPT | Performed by: FAMILY MEDICINE

## 2022-03-02 PROCEDURE — 85027 COMPLETE CBC AUTOMATED: CPT | Performed by: FAMILY MEDICINE

## 2022-03-02 PROCEDURE — 80061 LIPID PANEL: CPT | Performed by: FAMILY MEDICINE

## 2022-03-02 PROCEDURE — 99213 OFFICE O/P EST LOW 20 MIN: CPT | Performed by: FAMILY MEDICINE

## 2022-03-02 RX ORDER — TIMOLOL MALEATE 5 MG/ML
SOLUTION/ DROPS OPHTHALMIC
COMMUNITY
Start: 2022-01-22

## 2022-03-02 NOTE — TELEPHONE ENCOUNTER
----- Message from Ba Miguel MD sent at 3/2/2022  4:41 PM CST -----  Please inform patient that kidney function and liver function are normal.Cholesterol levels are at goal range.Hemoglobin A1c is at 5.8%No other concerns on blood work.

## 2022-03-02 NOTE — PROGRESS NOTES
"  Assessment & Plan     Health care maintenance  Patient is here for fasting blood work and medication follow-up  We will obtain labs and follow-up based on results  - HEMOGLOBIN A1C; Future  - Comprehensive metabolic panel (BMP + Alb, Alk Phos, ALT, AST, Total. Bili, TP); Future  - CBC with platelets; Future  - Lipid panel; Future    Type 2 diabetes mellitus without complication, without long-term current use of insulin (H)  Glucose levels been running in normal range we will check A1c today  Continue to work on healthy eating and getting regular exercise  Patient has not been going out much due to Covid  We will follow-up based on labs  - HEMOGLOBIN A1C; Future    Morbid obesity (H)  Patient is down 22 pound since I saw him 6 months ago  He has noted that he has not had as much of an appetite so has been eating differently  Continue to monitor for weight    Bradycardia  Patient is slightly bradycardic today on examination  Discussed with him watching for any signs of dizziness or fatigue if so further work-up with echo and cardiology    Primary hypertension  Patient's blood pressure goal range check kidney function electrolytes    Mixed hyperlipidemia  Patient is on statin therapy goal LDL below 100 with his history of diabetes    Anxiety  Patient feels current dosing of sertraline working well with like to continue with current dosing         BMI:   Estimated body mass index is 47.07 kg/m  as calculated from the following:    Height as of this encounter: 1.74 m (5' 8.5\").    Weight as of this encounter: 142.5 kg (314 lb 2 oz).   Weight management plan: Discussed healthy diet and exercise guidelines        No follow-ups on file.    Ba Miguel MD  [facility]    Mario Mayfield is a 76 year old who presents for the following health issues  Patient is here for medication check  He has no acute concerns  Blood pressure is at goal range she is fasting interested in blood " work  A1c has been running at goal range we will check labs today and follow-up based on results  He is on SSRI for anxiety feeling well with his current medication  He has had successful 22 pound weight loss with decreasing his appetite and what he is consuming in his diet I congratulated him on his 22 pound weight loss in last 6 months and encouraged him to continue BMI continues to be in the morbidly obese range  He has not had a gout flare for many years but wants to continue with allopurinol as he feels it is working well  We will check labs today and follow-up  Recommend follow-up in 6 months    History of Present Illness       Diabetes:   He presents for follow up of diabetes.  He is checking home blood glucose one time daily. He checks blood glucose before meals.  Blood glucose is never over 200 and never under 70. When his blood glucose is low, the patient is asymptomatic for confusion, blurred vision, lethargy and reports not feeling dizzy, shaky, or weak.  He has no concerns regarding his diabetes at this time.  He is not experiencing numbness or burning in feet, excessive thirst, blurry vision, weight changes or redness, sores or blisters on feet.         Hyperlipidemia:  He presents for follow up of hyperlipidemia.  He is taking medication to lower cholesterol. He is not having myalgia or other side effects to statin medications.    Hypertension: He presents for follow up of hypertension.  He does check blood pressure  regularly outside of the clinic. Outpatient blood pressures have not been over 140/90. He does not follow a low salt diet.   Reason for visit:  [facility]  Symptom progression:  Staying the same  What makes it worse:  G    He eats 0-1 servings of fruits and vegetables daily.He consumes 2 sweetened beverage(s) daily.He exercises with enough effort to increase his heart rate 9 or less minutes per day.  He exercises with enough effort to increase his heart rate 3 or less days per week.   He is  "taking medications regularly.           Review of Systems   Constitutional, HEENT, cardiovascular, pulmonary, gi and gu systems are negative, except as otherwise noted.      Objective    /63 (BP Location: Left arm, Patient Position: Sitting, Cuff Size: Adult Large)   Pulse (!) 43   Resp 16   Ht 1.74 m (5' 8.5\")   Wt 142.5 kg (314 lb 2 oz)   BMI 47.07 kg/m    Body mass index is 47.07 kg/m .  Physical Exam   GENERAL: healthy, alert and no distress  EYES: Eyes grossly normal to inspection, PERRL and conjunctivae and sclerae normal  RESP: lungs clear to auscultation - no rales, rhonchi or wheezes  CV: regular rate and rhythm, normal S1 S2, no S3 or S4, no murmur, click or rub, no peripheral edema and peripheral pulses strong  ABDOMEN: bowel sounds normal and BMI greater than 40  MS: no gross musculoskeletal defects noted, no edema, diabetic foot exam showing some neuropathy bilateral feet good peripheral pulses dry callused feet discussed lotion use  NEURO: Normal strength and tone, mentation intact and speech normal  PSYCH: mentation appears normal, affect normal/bright            "

## 2022-03-02 NOTE — LETTER
March 2, 2022      Chaparro Guillen  [id number] [phone]        Dear ,    We are writing to inform you of your test results.    kidney function and liver function are normal.  Cholesterol levels are at goal range.  Hemoglobin A1c is at 5.8%  No other concerns on blood work      Resulted Orders   HEMOGLOBIN A1C   Result Value Ref Range    Hemoglobin A1C 5.8 (H) 0.0 - 5.6 %      Comment:      Normal <5.7%   Prediabetes 5.7-6.4%    Diabetes 6.5% or higher     Note: Adopted from ADA consensus guidelines.   Comprehensive metabolic panel (BMP + Alb, Alk Phos, ALT, AST, Total. Bili, TP)   Result Value Ref Range    Sodium 137 136 - 145 mmol/L    Potassium 3.9 3.5 - 5.0 mmol/L    Chloride 99 98 - 107 mmol/L    Carbon Dioxide (CO2) 26 22 - 31 mmol/L    Anion Gap 12 5 - 18 mmol/L    Urea Nitrogen 17 8 - 28 mg/dL    Creatinine 0.95 0.70 - 1.30 mg/dL    Calcium 9.1 8.5 - 10.5 mg/dL    Glucose 114 70 - 125 mg/dL    Alkaline Phosphatase 62 45 - 120 U/L    AST 14 0 - 40 U/L    ALT 15 0 - 45 U/L    Protein Total 7.2 6.0 - 8.0 g/dL    Albumin 3.6 3.5 - 5.0 g/dL    Bilirubin Total 1.0 0.0 - 1.0 mg/dL    GFR Estimate 83 >60 mL/min/1.73m2      Comment:      Effective December 21, 2021 eGFRcr in adults is calculated using the 2021 CKD-EPI creatinine equation which includes age and gender (Austin et al., NEJ, DOI: 10.1056/KNVVmw4333740)   CBC with platelets   Result Value Ref Range    WBC Count 10.4 4.0 - 11.0 10e3/uL    RBC Count 5.36 4.40 - 5.90 10e6/uL    Hemoglobin 15.7 13.3 - 17.7 g/dL    Hematocrit 46.8 40.0 - 53.0 %    MCV 87 78 - 100 fL    MCH 29.3 26.5 - 33.0 pg    MCHC 33.5 31.5 - 36.5 g/dL    RDW 14.6 10.0 - 15.0 %    Platelet Count 270 150 - 450 10e3/uL   Lipid panel   Result Value Ref Range    Cholesterol 103 <=199 mg/dL    Triglycerides 118 <=149 mg/dL    Direct Measure HDL 41 >=40 mg/dL      Comment:      HDL Cholesterol Reference Range:     0-2 years:   No reference ranges  established for patients under 2 years old  at [facility] for lipid analytes.    2-8 years:  Greater than 45 mg/dL     18 years and older:   Female: Greater than or equal to 50 mg/dL   Male:   Greater than or equal to 40 mg/dL    LDL Cholesterol Calculated 38 <=129 mg/dL    Patient Fasting > 8hrs? Yes        If you have any questions or concerns, please call the clinic at the number listed above.       Sincerely,

## 2022-03-03 DIAGNOSIS — E11.9 TYPE 2 DIABETES MELLITUS WITHOUT COMPLICATION, WITHOUT LONG-TERM CURRENT USE OF INSULIN (H): Primary | ICD-10-CM

## 2022-03-03 RX ORDER — BLOOD SUGAR DIAGNOSTIC
STRIP MISCELLANEOUS
Qty: 200 STRIP | Refills: 1 | Status: SHIPPED | OUTPATIENT
Start: 2022-03-03 | End: 2023-03-05

## 2022-03-22 DIAGNOSIS — E11.9 TYPE 2 DIABETES MELLITUS WITHOUT COMPLICATION, WITHOUT LONG-TERM CURRENT USE OF INSULIN (H): ICD-10-CM

## 2022-03-23 RX ORDER — METFORMIN HCL 500 MG
TABLET, EXTENDED RELEASE 24 HR ORAL
Qty: 270 TABLET | Refills: 1 | Status: SHIPPED | OUTPATIENT
Start: 2022-03-23 | End: 2022-09-13

## 2022-03-23 NOTE — TELEPHONE ENCOUNTER
"Last Written Prescription Date:  6/26/21  Last Fill Quantity: 270,  # refills: 2   Last office visit provider:  3/2/22     Requested Prescriptions   Pending Prescriptions Disp Refills     metFORMIN (GLUCOPHAGE-XR) 500 MG 24 hr tablet [Pharmacy Med Name: metFORMIN HCl ER Oral Tablet Extended Release 24 Hour 500 MG] 270 tablet 0     Sig: TAKE 3 TABLETS BY MOUTH ONCE DAILY WITH BREAKFAST       Biguanide Agents Passed - 3/23/2022  7:49 AM        Passed - Patient is age 10 or older        Passed - Patient has documented A1c within the specified period of time.     If HgbA1C is 8 or greater, it needs to be on file within the past 3 months.  If less than 8, must be on file within the past 6 months.     Recent Labs   Lab Test 03/02/22  0734   A1C 5.8*             Passed - Patient's CR is NOT>1.4 OR Patient's EGFR is NOT<45 within past 12 mos.     Recent Labs   Lab Test 03/02/22  0734 08/27/21  0840 02/24/21  0915   GFRESTIMATED 83   < > >60   GFRESTBLACK  --   --  >60    < > = values in this interval not displayed.       Recent Labs   Lab Test 03/02/22  0734   CR 0.95             Passed - Patient does NOT have a diagnosis of CHF.        Passed - Medication is active on med list        Passed - Recent (6 mo) or future (30 days) visit within the authorizing provider's specialty     Patient had office visit in the last 6 months or has a visit in the next 30 days with authorizing provider or within the authorizing provider's specialty.  See \"Patient Info\" tab in inbasket, or \"Choose Columns\" in Meds & Orders section of the refill encounter.                 Isidro Cordon RN 03/23/22 7:50 AM  "

## 2022-04-20 DIAGNOSIS — Z79.899 MEDICATION MANAGEMENT: ICD-10-CM

## 2022-04-22 RX ORDER — ALLOPURINOL 300 MG/1
TABLET ORAL
Qty: 90 TABLET | Refills: 0 | Status: SHIPPED | OUTPATIENT
Start: 2022-04-22 | End: 2022-07-20

## 2022-04-22 NOTE — TELEPHONE ENCOUNTER
"Routing refill request to provider for review/approval because:  Labs not current:  Uric acid    Last Written Prescription Date:  1/14/22  Last Fill Quantity: 90,  # refills: 0   Last office visit provider:  3/2/22     Requested Prescriptions   Pending Prescriptions Disp Refills     allopurinol (ZYLOPRIM) 300 MG tablet [Pharmacy Med Name: Allopurinol Oral Tablet 300 MG] 90 tablet 0     Sig: Take 1 tablet  by mouth once daily       Gout Agents Protocol Failed - 4/22/2022  7:30 AM        Failed - Has Uric Acid on file in past 12 months and value is less than 6     No lab results found.  If level is 6mg/dL or greater, ok to refill one time and refer to provider.           Passed - CBC on file in past 12 months     Recent Labs   Lab Test 03/02/22  0734   WBC 10.4   RBC 5.36   HGB 15.7   HCT 46.8                    Passed - ALT on file in past 12 months     Recent Labs   Lab Test 03/02/22  0734   ALT 15             Passed - Recent (12 mo) or future (30 days) visit within the authorizing provider's specialty     Patient has had an office visit with the authorizing provider or a provider within the authorizing providers department within the previous 12 mos or has a future within next 30 days. See \"Patient Info\" tab in inbasket, or \"Choose Columns\" in Meds & Orders section of the refill encounter.              Passed - Medication is active on med list        Passed - Patient is age 18 or older        Passed - Normal serum creatinine on file in the past 12 months     Recent Labs   Lab Test 03/02/22  0734   CR 0.95       Ok to refill medication if creatinine is low               Isidro Cordon RN 04/22/22 7:30 AM  "

## 2022-07-20 DIAGNOSIS — Z79.899 MEDICATION MANAGEMENT: ICD-10-CM

## 2022-07-20 RX ORDER — ALLOPURINOL 300 MG/1
TABLET ORAL
Qty: 90 TABLET | Refills: 0 | Status: SHIPPED | OUTPATIENT
Start: 2022-07-20 | End: 2022-11-01

## 2022-07-20 NOTE — TELEPHONE ENCOUNTER
"Routing refill request to provider for review/approval because:  Labs not current:  Uric acid    Last Written Prescription Date: 4/22/22  Last Fill Quantity: 90, # refills: 0  Last office visit provider: Myriam 3/2/22        Requested Prescriptions   Pending Prescriptions Disp Refills    allopurinol (ZYLOPRIM) 300 MG tablet [Pharmacy Med Name: Allopurinol Oral Tablet 300 MG] 90 tablet 0     Sig: Take 1 tablet  by mouth once daily        Gout Agents Protocol Failed - 7/20/2022  2:00 AM        Failed - Has Uric Acid on file in past 12 months and value is less than 6     No lab results found.  If level is 6mg/dL or greater, ok to refill one time and refer to provider.             Passed - CBC on file in past 12 months     Recent Labs   Lab Test 03/02/22  0734   WBC 10.4   RBC 5.36   HGB 15.7   HCT 46.8                      Passed - ALT on file in past 12 months     Recent Labs   Lab Test 03/02/22  0734   ALT 15               Passed - Recent (12 mo) or future (30 days) visit within the authorizing provider's specialty     Patient has had an office visit with the authorizing provider or a provider within the authorizing providers department within the previous 12 mos or has a future within next 30 days. See \"Patient Info\" tab in inbasket, or \"Choose Columns\" in Meds & Orders section of the refill encounter.              Passed - Medication is active on med list        Passed - Patient is age 18 or older        Passed - Normal serum creatinine on file in the past 12 months     Recent Labs   Lab Test 03/02/22  0734   CR 0.95       Ok to refill medication if creatinine is low                    Stella Bright RN 07/20/22 11:23 AM    "

## 2022-08-18 DIAGNOSIS — E78.2 MIXED HYPERLIPIDEMIA: ICD-10-CM

## 2022-08-18 DIAGNOSIS — I10 HTN (HYPERTENSION): ICD-10-CM

## 2022-08-18 DIAGNOSIS — I10 ESSENTIAL HYPERTENSION: ICD-10-CM

## 2022-08-18 RX ORDER — HYDROCHLOROTHIAZIDE 25 MG/1
TABLET ORAL
Qty: 90 TABLET | Refills: 1 | Status: SHIPPED | OUTPATIENT
Start: 2022-08-18 | End: 2023-02-13

## 2022-08-18 RX ORDER — ATORVASTATIN CALCIUM 10 MG/1
TABLET, FILM COATED ORAL
Qty: 90 TABLET | Refills: 1 | Status: SHIPPED | OUTPATIENT
Start: 2022-08-18 | End: 2023-02-11

## 2022-08-18 RX ORDER — LISINOPRIL 10 MG/1
TABLET ORAL
Qty: 90 TABLET | Refills: 1 | Status: SHIPPED | OUTPATIENT
Start: 2022-08-18 | End: 2023-02-13

## 2022-08-18 NOTE — TELEPHONE ENCOUNTER
"Last Written Prescription Date:  9/5/2021  Last Fill Quantity: 90,  # refills: 3   Last office visit provider:  3/2/2022     Requested Prescriptions   Pending Prescriptions Disp Refills     hydrochlorothiazide (HYDRODIURIL) 25 MG tablet [Pharmacy Med Name: hydroCHLOROthiazide Oral Tablet 25 MG] 90 tablet 0     Sig: TAKE 1 TABLET (25 MG) BY MOUTH ONCE DAILY       Diuretics (Including Combos) Protocol Passed - 8/18/2022  2:24 PM        Passed - Blood pressure under 140/90 in past 12 months     BP Readings from Last 3 Encounters:   03/02/22 122/63   08/27/21 110/79   02/24/21 126/80                 Passed - Recent (12 mo) or future (30 days) visit within the authorizing provider's specialty     Patient has had an office visit with the authorizing provider or a provider within the authorizing providers department within the previous 12 mos or has a future within next 30 days. See \"Patient Info\" tab in inbasket, or \"Choose Columns\" in Meds & Orders section of the refill encounter.              Passed - Medication is active on med list        Passed - Patient is age 18 or older        Passed - Normal serum creatinine on file in past 12 months     Recent Labs   Lab Test 03/02/22  0734   CR 0.95              Passed - Normal serum potassium on file in past 12 months     Recent Labs   Lab Test 03/02/22  0734   POTASSIUM 3.9                    Passed - Normal serum sodium on file in past 12 months     Recent Labs   Lab Test 03/02/22  0734                 Last Written Prescription Date:  9/5/2021  Last Fill Quantity: 90,  # refills: 3   Last office visit provider:  3/2/2022        atorvastatin (LIPITOR) 10 MG tablet [Pharmacy Med Name: Atorvastatin Calcium Oral Tablet 10 MG] 90 tablet 0     Sig: TAKE 1 TABLET (10 MG) BY MOUTH ONCE DAILY       Statins Protocol Passed - 8/18/2022  2:24 PM        Passed - LDL on file in past 12 months     Recent Labs   Lab Test 03/02/22  0734   LDL 38             Passed - No abnormal " "creatine kinase in past 12 months     No lab results found.             Passed - Recent (12 mo) or future (30 days) visit within the authorizing provider's specialty     Patient has had an office visit with the authorizing provider or a provider within the authorizing providers department within the previous 12 mos or has a future within next 30 days. See \"Patient Info\" tab in inbasket, or \"Choose Columns\" in Meds & Orders section of the refill encounter.              Passed - Medication is active on med list        Passed - Patient is age 18 or older        Last Written Prescription Date:  9/5/2021  Last Fill Quantity: 90,  # refills: 3   Last office visit provider:  3/2/2022        lisinopril (ZESTRIL) 10 MG tablet [Pharmacy Med Name: Lisinopril Oral Tablet 10 MG] 90 tablet 0     Sig: TAKE 1 TABLET (10 MG) BY MOUTH ONCE DAILY       ACE Inhibitors (Including Combos) Protocol Passed - 8/18/2022  2:24 PM        Passed - Blood pressure under 140/90 in past 12 months     BP Readings from Last 3 Encounters:   03/02/22 122/63   08/27/21 110/79   02/24/21 126/80                 Passed - Recent (12 mo) or future (30 days) visit within the authorizing provider's specialty     Patient has had an office visit with the authorizing provider or a provider within the authorizing providers department within the previous 12 mos or has a future within next 30 days. See \"Patient Info\" tab in inbasket, or \"Choose Columns\" in Meds & Orders section of the refill encounter.              Passed - Medication is active on med list        Passed - Patient is age 18 or older        Passed - Normal serum creatinine on file in past 12 months     Recent Labs   Lab Test 03/02/22  0734   CR 0.95       Ok to refill medication if creatinine is low          Passed - Normal serum potassium on file in past 12 months     Recent Labs   Lab Test 03/02/22  0734   POTASSIUM 3.9                  Betty Conte RN 08/18/22 2:25 PM  "

## 2022-09-02 ENCOUNTER — OFFICE VISIT (OUTPATIENT)
Dept: FAMILY MEDICINE | Facility: CLINIC | Age: 77
End: 2022-09-02
Payer: COMMERCIAL

## 2022-09-02 VITALS
DIASTOLIC BLOOD PRESSURE: 81 MMHG | WEIGHT: 315 LBS | BODY MASS INDEX: 47.8 KG/M2 | SYSTOLIC BLOOD PRESSURE: 130 MMHG | RESPIRATION RATE: 20 BRPM | HEART RATE: 62 BPM

## 2022-09-02 DIAGNOSIS — I10 PRIMARY HYPERTENSION: ICD-10-CM

## 2022-09-02 DIAGNOSIS — F41.9 ANXIETY: ICD-10-CM

## 2022-09-02 DIAGNOSIS — E66.01 MORBID OBESITY (H): ICD-10-CM

## 2022-09-02 DIAGNOSIS — Z12.11 SCREEN FOR COLON CANCER: ICD-10-CM

## 2022-09-02 DIAGNOSIS — E11.9 TYPE 2 DIABETES MELLITUS WITHOUT COMPLICATION, WITHOUT LONG-TERM CURRENT USE OF INSULIN (H): Primary | ICD-10-CM

## 2022-09-02 DIAGNOSIS — M10.9 GOUTY ARTHROPATHY: ICD-10-CM

## 2022-09-02 LAB
CREAT UR-MCNC: 34.3 MG/DL
HBA1C MFR BLD: 5.8 % (ref 0–5.6)
HOLD SPECIMEN: NORMAL
MICROALBUMIN UR-MCNC: <12 MG/L
MICROALBUMIN/CREAT UR: NORMAL MG/G{CREAT}

## 2022-09-02 PROCEDURE — 90677 PCV20 VACCINE IM: CPT | Performed by: FAMILY MEDICINE

## 2022-09-02 PROCEDURE — 90662 IIV NO PRSV INCREASED AG IM: CPT | Performed by: FAMILY MEDICINE

## 2022-09-02 PROCEDURE — 83036 HEMOGLOBIN GLYCOSYLATED A1C: CPT | Performed by: FAMILY MEDICINE

## 2022-09-02 PROCEDURE — 99214 OFFICE O/P EST MOD 30 MIN: CPT | Mod: 25 | Performed by: FAMILY MEDICINE

## 2022-09-02 PROCEDURE — G0008 ADMIN INFLUENZA VIRUS VAC: HCPCS | Performed by: FAMILY MEDICINE

## 2022-09-02 PROCEDURE — 82043 UR ALBUMIN QUANTITATIVE: CPT | Performed by: FAMILY MEDICINE

## 2022-09-02 PROCEDURE — 36415 COLL VENOUS BLD VENIPUNCTURE: CPT | Performed by: FAMILY MEDICINE

## 2022-09-02 PROCEDURE — G0009 ADMIN PNEUMOCOCCAL VACCINE: HCPCS | Performed by: FAMILY MEDICINE

## 2022-09-02 NOTE — PROGRESS NOTES
"  Assessment & Plan     Type 2 diabetes mellitus without complication, without long-term current use of insulin (H)  hgba1c at goal range.  F/u in 6 months  Continue to work on diet and exercise  Check micro today  - Hemoglobin A1c; Future  - Albumin Random Urine Quantitative with Creat Ratio; Future  - Hemoglobin A1c  - Albumin Random Urine Quantitative with Creat Ratio    Screen for colon cancer  Due for colon cancer screening    Primary hypertension  bp at goal-continue with current medications    Morbid obesity (H)  BMI over 40  Continue to eat healthy and work on regular exercise    Anxiety  Doing well with current selective serotonin reuptake inhibitor- will continue with current dosing    Gout  Doing well with medication- not having flares  Check kidney function with use of allopurinol-continue to monitor         BMI:   Estimated body mass index is 47.8 kg/m  as calculated from the following:    Height as of 3/2/22: 1.74 m (5' 8.5\").    Weight as of this encounter: 144.7 kg (319 lb).       No follow-ups on file.   Follow-up Visit   Expected date:  Mar 06, 2023 (Approximate)      Follow Up Appointment Details:     Follow-up with whom?: Me    Follow-Up for what?: Chronic Disease f/u    Chronic Disease f/u: Diabetes    How?: In Person                    Ba Miguel MD  [facility]    Mario Mayfield is a 77 year old, presenting for the following health issues:  Diabetes (Fasting labs)  Pt here for DM follow up and medication check  Hgba1c returns at goal range  Pt is aware of weight- we discussed diet and exercise  Pt on selective serotonin reuptake inhibitor for anxiety- doing well would like to continue.  Pt due for colon cancer screen.  Checking feet daily.  Pt taking allopurinol- helping prevent flares- reviewed labs from March    History of Present Illness       Diabetes:   He presents for follow up of diabetes.  He is checking home blood glucose one time daily. He " checks blood glucose before meals.  Blood glucose is never over 200 and never under 70. When his blood glucose is low, the patient is asymptomatic for confusion, blurred vision, lethargy and reports not feeling dizzy, shaky, or weak.  He has no concerns regarding his diabetes at this time.  He is having excessive thirst and none of these symptoms.         Hypertension: He presents for follow up of hypertension.  He does not check blood pressure  regularly outside of the clinic. Outpatient blood pressures have not been over 140/90. He does not follow a low salt diet.               Review of Systems   Constitutional, HEENT, cardiovascular, pulmonary, gi and gu systems are negative, except as otherwise noted.      Objective    There were no vitals taken for this visit.  There is no height or weight on file to calculate BMI.  Physical Exam   GENERAL: healthy, alert and no distress  EYES: Eyes grossly normal to inspection, PERRL and conjunctivae and sclerae normal  RESP: lungs clear to auscultation - no rales, rhonchi or wheezes  CV: regular rate and rhythm, normal S1 S2, no S3 or S4, no murmur, click or rub, no peripheral edema and peripheral pulses strong  ABDOMEN: bowel sounds normal  MS: no gross musculoskeletal defects noted, no edema  NEURO: Normal strength and tone, mentation intact and speech normal  PSYCH: mentation appears normal, affect normal/bright  Diabetic foot exam: normal DP and PT pulses, no trophic changes or ulcerative lesions and normal sensory exam                    [unfilled]  [unfilled]

## 2022-09-13 DIAGNOSIS — E11.9 TYPE 2 DIABETES MELLITUS WITHOUT COMPLICATION, WITHOUT LONG-TERM CURRENT USE OF INSULIN (H): ICD-10-CM

## 2022-09-13 RX ORDER — METFORMIN HCL 500 MG
TABLET, EXTENDED RELEASE 24 HR ORAL
Qty: 270 TABLET | Refills: 1 | Status: SHIPPED | OUTPATIENT
Start: 2022-09-13 | End: 2023-03-10

## 2022-09-13 NOTE — TELEPHONE ENCOUNTER
"Last Written Prescription Date:  3/23/22  Last Fill Quantity: 270,  # refills: 1   Last office visit provider: 9/2/22      Requested Prescriptions   Pending Prescriptions Disp Refills     metFORMIN (GLUCOPHAGE XR) 500 MG 24 hr tablet [Pharmacy Med Name: metFORMIN HCl ER Oral Tablet Extended Release 24 Hour 500 MG] 270 tablet 0     Sig: TAKE 3 TABLETS BY MOUTH ONCE DAILY WITH BREAKFAST       Biguanide Agents Passed - 9/13/2022  2:00 AM        Passed - Patient is age 10 or older        Passed - Patient has documented A1c within the specified period of time.     If HgbA1C is 8 or greater, it needs to be on file within the past 3 months.  If less than 8, must be on file within the past 6 months.     Recent Labs   Lab Test 09/02/22  0951   A1C 5.8*             Passed - Patient's CR is NOT>1.4 OR Patient's EGFR is NOT<45 within past 12 mos.     Recent Labs   Lab Test 03/02/22  0734 08/27/21  0840 02/24/21  0915   GFRESTIMATED 83   < > >60   GFRESTBLACK  --   --  >60    < > = values in this interval not displayed.       Recent Labs   Lab Test 03/02/22  0734   CR 0.95             Passed - Patient does NOT have a diagnosis of CHF.        Passed - Medication is active on med list        Passed - Recent (6 mo) or future (30 days) visit within the authorizing provider's specialty     Patient had office visit in the last 6 months or has a visit in the next 30 days with authorizing provider or within the authorizing provider's specialty.  See \"Patient Info\" tab in inbasket, or \"Choose Columns\" in Meds & Orders section of the refill encounter.                 Brigitte Umaña RN 09/13/22 1:03 PM  "

## 2022-09-15 DIAGNOSIS — E11.9 TYPE 2 DIABETES MELLITUS WITHOUT COMPLICATION, WITHOUT LONG-TERM CURRENT USE OF INSULIN (H): Primary | ICD-10-CM

## 2022-09-16 RX ORDER — LANCETS 30 GAUGE
EACH MISCELLANEOUS
Qty: 200 EACH | Refills: 0 | Status: SHIPPED | OUTPATIENT
Start: 2022-09-16 | End: 2023-03-05

## 2022-09-16 NOTE — TELEPHONE ENCOUNTER
"Routing refill request to provider for review/approval because:  A break in medication  Medication is reported/historical    Last Written Prescription Date: 1/18/2015  Last Fill Quantity: ?, # refills: ?  Last office visit provider: Myriam 9/2/2022    Diabetes management unclear from previous visit note. Unsure frequency of BG checks. Please advise.      Requested Prescriptions   Pending Prescriptions Disp Refills    Lancets (ONETOUCH DELICA PLUS [id number]) MISC [Pharmacy Med Name: OneTouch Delica Plus [id number] Miscellaneous] 200 each 0     Sig: TEST TWICE DAILY        Diabetic Supplies Protocol Passed - 9/15/2022 11:49 AM        Passed - Medication is active on med list        Passed - Patient is 18 years of age or older        Passed - Recent (6 mo) or future (30 days) visit within the authorizing provider's specialty     Patient had office visit in the last 6 months or has a visit in the next 30 days with authorizing provider.  See \"Patient Info\" tab in inbasket, or \"Choose Columns\" in Meds & Orders section of the refill encounter.                    Lolita Diaz RN, BSN 09/16/22 9:42 AM   "

## 2022-10-15 DIAGNOSIS — R60.9 EDEMA, UNSPECIFIED TYPE: ICD-10-CM

## 2022-10-15 DIAGNOSIS — F41.9 ANXIETY: ICD-10-CM

## 2022-10-15 RX ORDER — FUROSEMIDE 20 MG
TABLET ORAL
Qty: 90 TABLET | Refills: 3 | Status: SHIPPED | OUTPATIENT
Start: 2022-10-15 | End: 2023-09-06

## 2022-10-15 RX ORDER — SERTRALINE HYDROCHLORIDE 25 MG/1
TABLET, FILM COATED ORAL
Qty: 90 TABLET | Refills: 3 | Status: SHIPPED | OUTPATIENT
Start: 2022-10-15 | End: 2023-09-06

## 2022-10-15 NOTE — TELEPHONE ENCOUNTER
"Last Written Prescription Date:  1/23/22  Last Fill Quantity: 90,  # refills: 2   Last office visit provider:  9/2/22     Requested Prescriptions   Pending Prescriptions Disp Refills     sertraline (ZOLOFT) 25 MG tablet [Pharmacy Med Name: Sertraline HCl Oral Tablet 25 MG] 90 tablet 0     Sig: Take 1 tablet (25 mg total) by mouth ONCE daily.       SSRIs Protocol Passed - 10/15/2022  2:02 AM        Passed - Recent (12 mo) or future (30 days) visit within the authorizing provider's specialty     Patient has had an office visit with the authorizing provider or a provider within the authorizing providers department within the previous 12 mos or has a future within next 30 days. See \"Patient Info\" tab in inbasket, or \"Choose Columns\" in Meds & Orders section of the refill encounter.              Passed - Medication is active on med list        Passed - Patient is age 18 or older           furosemide (LASIX) 20 MG tablet [Pharmacy Med Name: Furosemide Oral Tablet 20 MG] 90 tablet 0     Sig: Take 1 tablet (20 mg) by mouth once a day       Diuretics (Including Combos) Protocol Passed - 10/15/2022  2:02 AM        Passed - Blood pressure under 140/90 in past 12 months     BP Readings from Last 3 Encounters:   09/02/22 130/81   03/02/22 122/63   08/27/21 110/79                 Passed - Recent (12 mo) or future (30 days) visit within the authorizing provider's specialty     Patient has had an office visit with the authorizing provider or a provider within the authorizing providers department within the previous 12 mos or has a future within next 30 days. See \"Patient Info\" tab in inbasket, or \"Choose Columns\" in Meds & Orders section of the refill encounter.              Passed - Medication is active on med list        Passed - Patient is age 18 or older        Passed - Normal serum creatinine on file in past 12 months     Recent Labs   Lab Test 03/02/22  0734   CR 0.95              Passed - Normal serum potassium on file in " past 12 months     Recent Labs   Lab Test 03/02/22  0734   POTASSIUM 3.9                    Passed - Normal serum sodium on file in past 12 months     Recent Labs   Lab Test 03/02/22  0734                      Shagufta Choudhary, RN 10/15/22 2:05 PM

## 2022-10-31 DIAGNOSIS — Z79.899 MEDICATION MANAGEMENT: ICD-10-CM

## 2022-11-01 RX ORDER — ALLOPURINOL 300 MG/1
1 TABLET ORAL DAILY
Qty: 90 TABLET | Refills: 0 | Status: SHIPPED | OUTPATIENT
Start: 2022-11-01 | End: 2023-01-18

## 2022-11-01 NOTE — TELEPHONE ENCOUNTER
"Routing refill request to provider for review/approval because:  Labs not current:  Uric acid    Last Written Prescription Date:  7/20/22  Last Fill Quantity: 90,  # refills: 0   Last office visit provider:  9/2/22     Requested Prescriptions   Pending Prescriptions Disp Refills     allopurinol (ZYLOPRIM) 300 MG tablet [Pharmacy Med Name: Allopurinol Oral Tablet 300 MG] 90 tablet 0     Sig: Take 1 tablet  by mouth once daily       Gout Agents Protocol Failed - 11/1/2022  9:36 AM        Failed - Has Uric Acid on file in past 12 months and value is less than 6     No lab results found.  If level is 6mg/dL or greater, ok to refill one time and refer to provider.           Passed - CBC on file in past 12 months     Recent Labs   Lab Test 03/02/22  0734   WBC 10.4   RBC 5.36   HGB 15.7   HCT 46.8                    Passed - ALT on file in past 12 months     Recent Labs   Lab Test 03/02/22  0734   ALT 15             Passed - Recent (12 mo) or future (30 days) visit within the authorizing provider's specialty     Patient has had an office visit with the authorizing provider or a provider within the authorizing providers department within the previous 12 mos or has a future within next 30 days. See \"Patient Info\" tab in inbasket, or \"Choose Columns\" in Meds & Orders section of the refill encounter.              Passed - Medication is active on med list        Passed - Patient is age 18 or older        Passed - Normal serum creatinine on file in the past 12 months     Recent Labs   Lab Test 03/02/22  0734   CR 0.95       Ok to refill medication if creatinine is low               Isidro Cordon RN 11/01/22 9:36 AM  "

## 2022-11-17 ENCOUNTER — TRANSFERRED RECORDS (OUTPATIENT)
Dept: HEALTH INFORMATION MANAGEMENT | Facility: CLINIC | Age: 77
End: 2022-11-17

## 2022-11-17 LAB — RETINOPATHY: POSITIVE

## 2022-12-08 NOTE — LETTER
"Subjective   History of Present Illness    Patient is a 75-year-old female presenting to ED with concern for GI bleed.  PMH significant for insulin-dependent diabetes, daily use 81 mg aspirin, Hypertension, GERD, cholecystectomy, hysterectomy, partial thyroidectomy.  Patient states that today she noticed she was having black tarry stools and home health advised her that she may be having a GI bleed as her H&H has recently been decreasing.   at bedside states that for the past few months patient has been sleeping more \"almost up to 20 hours a day.\"  Patient states that she was seen at her primary care provider for problems with the weakness at which time they began monitoring her hemoglobin.  Patient denies any history of previous GI bleeds and states she routinely gets colonoscopies with her last one 3 to 4 years ago for which 1 polyp was removed with no abnormalities otherwise.  Patient has no history of upper EGDs.   at bedside did state that patient was recently admitted for uncontrolled diabetes with a blood sugar over 600 for which they have been trying to be more diligent however outside of this patient has recently been well.  Patient denies bleeding of any other source including epistaxis, hemoptysis, hematemesis, vaginal bleeding, or hematuria.  Patient states she takes an 81 mg aspirin daily but denies use of any further blood thinners.  Patient denies abdominal pain, nausea, vomiting, or bowel abnormalities and presents at this time after recommendation by home health.    Records reviewed show patient last seen in the ED on 11/23/2022 for weakness, impaired mobility, COVID-19, hyperglycemia.    Patient was recently seen outpatient at the PCP office yesterday for weakness, incontinence of feces, melena, gait difficulty, anticoagulated, uncontrolled type 2 diabetes.    Patient had an H&H yesterday of 8.8/26.6 and today 8.1/24.2.    Review of Systems   Constitutional: Positive for fatigue. " September 7, 2022      Chaparro Guillen  [address] [address]        Dear ,    Here are the results to your recent lab tests that you requested. If you have any questions or concerns you can contact us at [phone].      Resulted Orders   Hemoglobin A1c   Result Value Ref Range    Hemoglobin A1C 5.8 (H) 0.0 - 5.6 %      Comment:      Normal <5.7%   Prediabetes 5.7-6.4%    Diabetes 6.5% or higher     Note: Adopted from ADA consensus guidelines.   Albumin Random Urine Quantitative with Creat Ratio   Result Value Ref Range    Albumin Urine mg/L <12.0 mg/L      Comment:      The reference ranges have not been established in urine albumin. The results should be integrated into the clinical context for interpretation.    Albumin Urine mg/g Cr        Comment:      Unable to calculate, urine albumin and/or urine creatinine is outside detectable limits.  Microalbuminuria is defined as an albumin:creatinine ratio of 17 to 299 for males and 25 to 299 for females. A ratio of albumin:creatinine of 300 or higher is indicative of overt proteinuria.  Due to biologic variability, positive results should be confirmed by a second, first-morning random or 24-hour timed urine specimen. If there is discrepancy, a third specimen is recommended. When 2 out of 3 results are in the microalbuminuria range, this is evidence for incipient nephropathy and warrants increased efforts at glucose control, blood pressure control, and institution of therapy with an angiotensin-converting-enzyme (ACE) inhibitor (if the patient can tolerate it).      Creatinine Urine mg/dL 34.3 mg/dL      Comment:      The reference ranges have not been established in urine creatinine. The results should be integrated into the clinical context for interpretation.     Sincerely,      Ba Miguel MD/ yoon           Negative for chills, diaphoresis and fever.   HENT: Negative.  Negative for nosebleeds.    Eyes: Negative.    Respiratory: Negative.  Negative for shortness of breath.         Denies hemoptysis   Cardiovascular: Negative.  Negative for chest pain and leg swelling.   Gastrointestinal: Positive for blood in stool (black). Negative for abdominal pain, constipation, diarrhea, nausea and vomiting.        Denies hematemesis   Genitourinary: Negative.  Negative for hematuria and vaginal bleeding.   Musculoskeletal: Negative.    Skin: Negative.  Negative for pallor and wound.   Neurological: Positive for weakness. Negative for dizziness and light-headedness.   Hematological: Does not bruise/bleed easily.   Psychiatric/Behavioral: Negative.    All other systems reviewed and are negative.      Past Medical History:   Diagnosis Date   • Allergic rhinitis    • Chronic laryngitis    • Diabetes mellitus (HCC)    • GERD (gastroesophageal reflux disease)    • Hypertension    • Hypertrophy of both inferior nasal turbinates    • Laryngopharyngeal reflux    • Nontoxic multinodular goiter    • Sicca laryngitis        Allergies   Allergen Reactions   • Adhesive Tape Other (See Comments)     Tears and bruises skin   • Other Other (See Comments)     Bandaids make arm Red/slightly swollen       Past Surgical History:   Procedure Laterality Date   • CARPAL TUNNEL RELEASE     • CHOLECYSTECTOMY     • COLONOSCOPY  10/01/2008    Multiple polyps removed   • COLONOSCOPY  09/18/2013    Diverticulosis in the sigmoid colon. Dr. Dave Mcnally Recall 5 years   • ENDOSCOPY N/A 12/8/2022    Procedure: ESOPHAGOGASTRODUODENOSCOPY WITH ANESTHESIA;  Surgeon: Luis Hutchison MD;  Location: [facility] ENDOSCOPY;  Service: Gastroenterology;  Laterality: N/A;  pre op: melena  post op:duodenal ulcer, gastritis, hiatal hernia  PCP: Len Crowe MD   • HAND SURGERY     • HYSTERECTOMY     • REPLACEMENT TOTAL KNEE Right 02/02/2021   • THYROIDECTOMY, PARTIAL Right  "2010   • TONSILLECTOMY     • TUBAL ABDOMINAL LIGATION         Family History   Problem Relation Age of Onset   • Diabetes Mother    • Stroke Mother    • Heart disease Mother    • Heart disease Father    • Colon cancer Neg Hx    • Colon polyps Neg Hx        Social History     Socioeconomic History   • Marital status:      Spouse name: Laurie   • Number of children: 2   • Years of education: 16   Tobacco Use   • Smoking status: Never   • Smokeless tobacco: Never   • Tobacco comments:     \"lived with smokers most of my life\"   Vaping Use   • Vaping Use: Never used   Substance and Sexual Activity   • Alcohol use: No     Comment: Rare   • Drug use: No   • Sexual activity: Defer     Partners: Male     Birth control/protection: None           Objective   Physical Exam  Vitals and nursing note reviewed. Exam conducted with a chaperone present (Chaperone present for entire examination, Kayy YOUNG).   Constitutional:       General: She is not in acute distress.     Appearance: Normal appearance. She is well-developed, well-groomed and overweight. She is not toxic-appearing or diaphoretic.   HENT:      Head: Normocephalic.      Mouth/Throat:      Mouth: Mucous membranes are moist.      Pharynx: Oropharynx is clear.   Eyes:      Conjunctiva/sclera: Conjunctivae normal.      Pupils: Pupils are equal, round, and reactive to light.      Comments: No conjunctival pallor   Cardiovascular:      Rate and Rhythm: Regular rhythm. Tachycardia present.   Pulmonary:      Effort: Pulmonary effort is normal.      Breath sounds: Normal breath sounds.   Abdominal:      General: Bowel sounds are normal.      Palpations: Abdomen is soft.      Tenderness: There is no abdominal tenderness. There is no right CVA tenderness or left CVA tenderness.   Genitourinary:     Rectum: Guaiac result positive. No tenderness, anal fissure or external hemorrhoid. Normal anal tone.   Musculoskeletal:      Cervical back: Normal range of motion and neck " supple.      Right lower leg: No edema.      Left lower leg: No edema.   Skin:     General: Skin is warm and dry.      Coloration: Skin is not jaundiced.      Findings: No bruising.   Neurological:      Mental Status: She is alert and oriented to person, place, and time.   Psychiatric:         Mood and Affect: Mood normal.         Behavior: Behavior normal. Behavior is cooperative.         Procedures           ED Course  ED Course as of 12/08/22 1448   Wed Dec 07, 2022   2029 Phone discussion with VITA Bowser.  Request patient be made n.p.o. at midnight for likely EGD tomorrow and continued on Protonix.  Will kindly consult with admission to hospitalist services.  [JS]   [address]  [JS]      ED Course User Index  [JS] Payam Wright PA-C                                           MDM  Number of Diagnoses or Management Options     Amount and/or Complexity of Data Reviewed  Clinical lab tests: ordered and reviewed  Tests in the radiology section of CPT®: reviewed and ordered  Tests in the medicine section of CPT®: reviewed and ordered  Decide to obtain previous medical records or to obtain history from someone other than the patient: yes  Obtain history from someone other than the patient: yes ()  Review and summarize past medical records: yes  Discuss the patient with other providers: yes (Dr. Hutchison (GI)  Dr. Acevedo (hospitalist))    Patient Progress  Patient progress: stable      Patient is a 75-year-old female presenting to ED with concern for GI bleed.  PMH significant for insulin-dependent diabetes, daily use 81 mg aspirin, Hypertension, GERD, cholecystectomy, hysterectomy, partial thyroidectomy.  Work-up revealed H&H of 8.2/25.7 Which is equivocal to 8.1/24.2 earlier this morning however decreased from 8.8/26.6 yesterday.  CBC otherwise unremarkable.  CMP with hyponatremia 132, hyperglycemia 183 no further acute abnormalities.  Lactic acid elevated at 2.3 with normal procalcitonin.  PT/INR WNL.  Iron  profile with iron saturation of 11 but otherwise unremarkable.  COVID, influenza, RSV testing negative.  Folate and vitamin B12 levels collected.  CT imaging of the abdomen and pelvis with IV contrast showed: Diverticulosis of colon, focal diverticulitis in the distal sigmoid region Where there is stranding of the adjacent fat, wall thickening, and trace amount of free fluid with no perforation or abscess.  Wall thickening of the stomach likely due to underdistention, there is some edema around the gastric antrum and proximal duodenum could be due to ulcer disease or inflammatory change, wall thickening in this area, small hiatal hernia, geographic fatty infiltration of the liver.  Case was discussed with Dr. Hutchison, GI, requested patient be made n.p.o. for plan for upper EGD performed in the morning for further evaluation of ulcers as well as possible GI bleed.  Request admission under the hospitalist services for which case was further discussed with Dr. Munguia, hospitalist, who will kindly except patient for admission under his services.  Advised patient on need for admission for further evaluation and treatment for which she is amenable with no further questions, concerns, needs.      Final diagnoses:   Gastrointestinal hemorrhage, unspecified gastrointestinal hemorrhage type   Diverticulitis   Hiatal hernia   Fatty liver       ED Disposition  ED Disposition     ED Disposition   Decision to Admit    Condition   --    Comment   Level of Care: Med/Surg [1]   Diagnosis: Gastrointestinal hemorrhage, unspecified gastrointestinal hemorrhage type [[id number]]   Admitting Physician: TRACIE MUNGUIA [[id number]]               No follow-up provider specified.       Medication List      ASK your doctor about these medications    amLODIPine 5 MG tablet  Commonly known as: NORVASC  Ask about: Which instructions should I use?     atorvastatin 20 MG tablet  Commonly known as: LIPITOR  Ask about: Which instructions should I use?      budesonide-formoterol 160-4.5 MCG/ACT inhaler  Commonly known as: SYMBICORT  Ask about: Which instructions should I use?     DULoxetine 20 MG capsule  Commonly known as: CYMBALTA  Ask about: Which instructions should I use?     irbesartan 300 MG tablet  Commonly known as: AVAPRO  Ask about: Which instructions should I use?     montelukast 10 MG tablet  Commonly known as: SINGULAIR  Ask about: Which instructions should I use?     potassium chloride 10 MEQ CR tablet  Ask about: Which instructions should I use?        hospitalist, who will come except patient for admission under his services at this time.  Advised patient on need for admission for further evaluation and treatment for which he is amenable with no further questions concerns, needs.     Payam Wright PA-C  12/08/22 [id number]

## 2023-01-17 DIAGNOSIS — Z79.899 MEDICATION MANAGEMENT: ICD-10-CM

## 2023-01-18 RX ORDER — ALLOPURINOL 300 MG/1
TABLET ORAL
Qty: 90 TABLET | Refills: 0 | Status: SHIPPED | OUTPATIENT
Start: 2023-01-18 | End: 2023-04-14

## 2023-01-18 NOTE — TELEPHONE ENCOUNTER
"Routing refill request to provider for review/approval because:  Labs not current:  Uric acid    Last Written Prescription Date:  11/1/22  Last Fill Quantity: 90,  # refills: 0   Last office visit provider:  9/2/22     Requested Prescriptions   Pending Prescriptions Disp Refills     allopurinol (ZYLOPRIM) 300 MG tablet [Pharmacy Med Name: Allopurinol Oral Tablet 300 MG] 90 tablet 0     Sig: Take 1 tablet (300 mg) by mouth once daily       Gout Agents Protocol Failed - 1/18/2023  3:15 PM        Failed - Has Uric Acid on file in past 12 months and value is less than 6     No lab results found.  If level is 6mg/dL or greater, ok to refill one time and refer to provider.           Failed - Medication is active on med list        Passed - CBC on file in past 12 months     Recent Labs   Lab Test 03/02/22  0734   WBC 10.4   RBC 5.36   HGB 15.7   HCT 46.8                    Passed - ALT on file in past 12 months     Recent Labs   Lab Test 03/02/22  0734   ALT 15             Passed - Recent (12 mo) or future (30 days) visit within the authorizing provider's specialty     Patient has had an office visit with the authorizing provider or a provider within the authorizing providers department within the previous 12 mos or has a future within next 30 days. See \"Patient Info\" tab in inbasket, or \"Choose Columns\" in Meds & Orders section of the refill encounter.              Passed - Patient is age 18 or older        Passed - Normal serum creatinine on file in the past 12 months     Recent Labs   Lab Test 03/02/22  0734   CR 0.95       Ok to refill medication if creatinine is low               Isidro Cordon RN 01/18/23 3:15 PM  "

## 2023-01-22 ENCOUNTER — APPOINTMENT (OUTPATIENT)
Dept: CT IMAGING | Facility: HOSPITAL | Age: 78
End: 2023-01-22
Attending: EMERGENCY MEDICINE
Payer: COMMERCIAL

## 2023-01-22 ENCOUNTER — HOSPITAL ENCOUNTER (OUTPATIENT)
Facility: HOSPITAL | Age: 78
Setting detail: OBSERVATION
Discharge: HOME | End: 2023-01-24
Attending: EMERGENCY MEDICINE | Admitting: HOSPITALIST
Payer: COMMERCIAL

## 2023-01-22 ENCOUNTER — ANESTHESIA (OUTPATIENT)
Dept: SURGERY | Facility: HOSPITAL | Age: 78
End: 2023-01-22
Payer: COMMERCIAL

## 2023-01-22 ENCOUNTER — ANESTHESIA EVENT (OUTPATIENT)
Dept: SURGERY | Facility: HOSPITAL | Age: 78
End: 2023-01-22
Payer: COMMERCIAL

## 2023-01-22 DIAGNOSIS — E11.9 TYPE 2 DIABETES MELLITUS WITHOUT COMPLICATION, WITHOUT LONG-TERM CURRENT USE OF INSULIN (H): ICD-10-CM

## 2023-01-22 DIAGNOSIS — K81.0 ACUTE CHOLECYSTITIS: ICD-10-CM

## 2023-01-22 DIAGNOSIS — N28.1 RENAL CYST, LEFT: ICD-10-CM

## 2023-01-22 DIAGNOSIS — Z86.39 HISTORY OF DIABETES MELLITUS: ICD-10-CM

## 2023-01-22 DIAGNOSIS — E66.01 MORBID OBESITY (H): ICD-10-CM

## 2023-01-22 DIAGNOSIS — E27.9 ADRENAL NODULE (H): ICD-10-CM

## 2023-01-22 DIAGNOSIS — E83.42 HYPOMAGNESEMIA: ICD-10-CM

## 2023-01-22 PROBLEM — R11.2 NAUSEA AND VOMITING: Status: ACTIVE | Noted: 2023-01-22

## 2023-01-22 PROBLEM — R10.11 ABDOMINAL PAIN, RIGHT UPPER QUADRANT: Status: ACTIVE | Noted: 2023-01-22

## 2023-01-22 PROBLEM — F41.9 ANXIETY: Status: ACTIVE | Noted: 2018-04-10

## 2023-01-22 PROBLEM — N40.0 BPH (BENIGN PROSTATIC HYPERPLASIA): Status: ACTIVE | Noted: 2017-12-27

## 2023-01-22 PROBLEM — E78.2 MIXED HYPERLIPIDEMIA: Status: ACTIVE | Noted: 2023-01-22

## 2023-01-22 LAB
ALBUMIN SERPL BCG-MCNC: 3.9 G/DL (ref 3.5–5.2)
ALP SERPL-CCNC: 59 U/L (ref 40–129)
ALT SERPL W P-5'-P-CCNC: 24 U/L (ref 10–50)
ANION GAP SERPL CALCULATED.3IONS-SCNC: 16 MMOL/L (ref 7–15)
AST SERPL W P-5'-P-CCNC: 22 U/L (ref 10–50)
BASOPHILS # BLD AUTO: 0.1 10E3/UL (ref 0–0.2)
BASOPHILS NFR BLD AUTO: 0 %
BILIRUB DIRECT SERPL-MCNC: 0.26 MG/DL (ref 0–0.3)
BILIRUB SERPL-MCNC: 0.9 MG/DL
BUN SERPL-MCNC: 20.1 MG/DL (ref 8–23)
CALCIUM SERPL-MCNC: 9.2 MG/DL (ref 8.8–10.2)
CHLORIDE SERPL-SCNC: 96 MMOL/L (ref 98–107)
CREAT SERPL-MCNC: 1.02 MG/DL (ref 0.67–1.17)
CREAT SERPL-MCNC: 1.02 MG/DL (ref 0.67–1.17)
DEPRECATED HCO3 PLAS-SCNC: 25 MMOL/L (ref 22–29)
EOSINOPHIL # BLD AUTO: 0 10E3/UL (ref 0–0.7)
EOSINOPHIL NFR BLD AUTO: 0 %
ERYTHROCYTE [DISTWIDTH] IN BLOOD BY AUTOMATED COUNT: 14.1 % (ref 10–15)
FLUAV RNA SPEC QL NAA+PROBE: NEGATIVE
FLUBV RNA RESP QL NAA+PROBE: NEGATIVE
GFR SERPL CREATININE-BSD FRML MDRD: 76 ML/MIN/1.73M2
GFR SERPL CREATININE-BSD FRML MDRD: 76 ML/MIN/1.73M2
GLUCOSE BLDC GLUCOMTR-MCNC: 119 MG/DL (ref 70–99)
GLUCOSE BLDC GLUCOMTR-MCNC: 137 MG/DL (ref 70–99)
GLUCOSE BLDC GLUCOMTR-MCNC: 152 MG/DL (ref 70–99)
GLUCOSE BLDC GLUCOMTR-MCNC: 164 MG/DL (ref 70–99)
GLUCOSE SERPL-MCNC: 144 MG/DL (ref 70–99)
HCT VFR BLD AUTO: 48.9 % (ref 40–53)
HGB BLD-MCNC: 16.8 G/DL (ref 13.3–17.7)
IMM GRANULOCYTES # BLD: 0.1 10E3/UL
IMM GRANULOCYTES NFR BLD: 1 %
LIPASE SERPL-CCNC: 27 U/L (ref 13–60)
LYMPHOCYTES # BLD AUTO: 1.7 10E3/UL (ref 0.8–5.3)
LYMPHOCYTES NFR BLD AUTO: 8 %
MAGNESIUM SERPL-MCNC: 1.6 MG/DL (ref 1.7–2.3)
MCH RBC QN AUTO: 29.9 PG (ref 26.5–33)
MCHC RBC AUTO-ENTMCNC: 34.4 G/DL (ref 31.5–36.5)
MCV RBC AUTO: 87 FL (ref 78–100)
MONOCYTES # BLD AUTO: 1.1 10E3/UL (ref 0–1.3)
MONOCYTES NFR BLD AUTO: 5 %
NEUTROPHILS # BLD AUTO: 17.9 10E3/UL (ref 1.6–8.3)
NEUTROPHILS NFR BLD AUTO: 86 %
NRBC # BLD AUTO: 0 10E3/UL
NRBC BLD AUTO-RTO: 0 /100
PLATELET # BLD AUTO: 296 10E3/UL (ref 150–450)
POTASSIUM SERPL-SCNC: 3.5 MMOL/L (ref 3.4–5.3)
PROT SERPL-MCNC: 7.7 G/DL (ref 6.4–8.3)
RBC # BLD AUTO: 5.61 10E6/UL (ref 4.4–5.9)
RSV RNA SPEC NAA+PROBE: NEGATIVE
SARS-COV-2 RNA RESP QL NAA+PROBE: NEGATIVE
SODIUM SERPL-SCNC: 137 MMOL/L (ref 136–145)
TROPONIN T SERPL HS-MCNC: 12 NG/L
WBC # BLD AUTO: 20.8 10E3/UL (ref 4–11)

## 2023-01-22 PROCEDURE — 250N000011 HC RX IP 250 OP 636: Performed by: HOSPITALIST

## 2023-01-22 PROCEDURE — 80053 COMPREHEN METABOLIC PANEL: CPT | Performed by: EMERGENCY MEDICINE

## 2023-01-22 PROCEDURE — 96361 HYDRATE IV INFUSION ADD-ON: CPT

## 2023-01-22 PROCEDURE — 82248 BILIRUBIN DIRECT: CPT | Performed by: EMERGENCY MEDICINE

## 2023-01-22 PROCEDURE — 258N000003 HC RX IP 258 OP 636: Performed by: HOSPITALIST

## 2023-01-22 PROCEDURE — 96375 TX/PRO/DX INJ NEW DRUG ADDON: CPT

## 2023-01-22 PROCEDURE — 999N000141 HC STATISTIC PRE-PROCEDURE NURSING ASSESSMENT: Performed by: SURGERY

## 2023-01-22 PROCEDURE — 250N000011 HC RX IP 250 OP 636: Performed by: SURGERY

## 2023-01-22 PROCEDURE — 82962 GLUCOSE BLOOD TEST: CPT

## 2023-01-22 PROCEDURE — 250N000011 HC RX IP 250 OP 636: Performed by: EMERGENCY MEDICINE

## 2023-01-22 PROCEDURE — 88304 TISSUE EXAM BY PATHOLOGIST: CPT | Mod: TC | Performed by: SURGERY

## 2023-01-22 PROCEDURE — 83690 ASSAY OF LIPASE: CPT | Performed by: EMERGENCY MEDICINE

## 2023-01-22 PROCEDURE — 96375 TX/PRO/DX INJ NEW DRUG ADDON: CPT | Mod: 59

## 2023-01-22 PROCEDURE — 258N000003 HC RX IP 258 OP 636: Performed by: ANESTHESIOLOGY

## 2023-01-22 PROCEDURE — 99205 OFFICE O/P NEW HI 60 MIN: CPT | Mod: 57 | Performed by: SURGERY

## 2023-01-22 PROCEDURE — 87637 SARSCOV2&INF A&B&RSV AMP PRB: CPT | Performed by: EMERGENCY MEDICINE

## 2023-01-22 PROCEDURE — 250N000025 HC SEVOFLURANE, PER MIN: Performed by: SURGERY

## 2023-01-22 PROCEDURE — 258N000003 HC RX IP 258 OP 636: Performed by: NURSE ANESTHETIST, CERTIFIED REGISTERED

## 2023-01-22 PROCEDURE — 250N000009 HC RX 250: Performed by: HOSPITALIST

## 2023-01-22 PROCEDURE — 250N000011 HC RX IP 250 OP 636: Performed by: ANESTHESIOLOGY

## 2023-01-22 PROCEDURE — G0378 HOSPITAL OBSERVATION PER HR: HCPCS

## 2023-01-22 PROCEDURE — C9803 HOPD COVID-19 SPEC COLLECT: HCPCS

## 2023-01-22 PROCEDURE — 250N000009 HC RX 250: Performed by: ANESTHESIOLOGY

## 2023-01-22 PROCEDURE — 99222 1ST HOSP IP/OBS MODERATE 55: CPT | Performed by: HOSPITALIST

## 2023-01-22 PROCEDURE — 272N000001 HC OR GENERAL SUPPLY STERILE: Performed by: SURGERY

## 2023-01-22 PROCEDURE — 258N000003 HC RX IP 258 OP 636: Performed by: SURGERY

## 2023-01-22 PROCEDURE — 74174 CTA ABD&PLVS W/CONTRAST: CPT

## 2023-01-22 PROCEDURE — 83735 ASSAY OF MAGNESIUM: CPT | Performed by: EMERGENCY MEDICINE

## 2023-01-22 PROCEDURE — 96374 THER/PROPH/DIAG INJ IV PUSH: CPT

## 2023-01-22 PROCEDURE — 250N000011 HC RX IP 250 OP 636: Performed by: NURSE ANESTHETIST, CERTIFIED REGISTERED

## 2023-01-22 PROCEDURE — 710N000009 HC RECOVERY PHASE 1, LEVEL 1, PER MIN: Performed by: SURGERY

## 2023-01-22 PROCEDURE — 360N000076 HC SURGERY LEVEL 3, PER MIN: Performed by: SURGERY

## 2023-01-22 PROCEDURE — 370N000017 HC ANESTHESIA TECHNICAL FEE, PER MIN: Performed by: SURGERY

## 2023-01-22 PROCEDURE — 36415 COLL VENOUS BLD VENIPUNCTURE: CPT | Performed by: EMERGENCY MEDICINE

## 2023-01-22 PROCEDURE — 250N000013 HC RX MED GY IP 250 OP 250 PS 637: Performed by: SURGERY

## 2023-01-22 PROCEDURE — 47562 LAPAROSCOPIC CHOLECYSTECTOMY: CPT | Performed by: SURGERY

## 2023-01-22 PROCEDURE — 99285 EMERGENCY DEPT VISIT HI MDM: CPT | Mod: 25,CS

## 2023-01-22 PROCEDURE — 84484 ASSAY OF TROPONIN QUANT: CPT | Performed by: EMERGENCY MEDICINE

## 2023-01-22 PROCEDURE — 258N000003 HC RX IP 258 OP 636: Performed by: EMERGENCY MEDICINE

## 2023-01-22 PROCEDURE — 85025 COMPLETE CBC W/AUTO DIFF WBC: CPT | Performed by: EMERGENCY MEDICINE

## 2023-01-22 PROCEDURE — 93005 ELECTROCARDIOGRAM TRACING: CPT | Mod: 59 | Performed by: EMERGENCY MEDICINE

## 2023-01-22 RX ORDER — POLYETHYLENE GLYCOL 3350 17 G/17G
17 POWDER, FOR SOLUTION ORAL DAILY
Status: DISCONTINUED | OUTPATIENT
Start: 2023-01-23 | End: 2023-01-24 | Stop reason: HOSPADM

## 2023-01-22 RX ORDER — FUROSEMIDE 20 MG
20 TABLET ORAL DAILY
Status: DISCONTINUED | OUTPATIENT
Start: 2023-01-22 | End: 2023-01-22

## 2023-01-22 RX ORDER — ONDANSETRON 4 MG/1
4 TABLET, ORALLY DISINTEGRATING ORAL EVERY 6 HOURS PRN
Status: DISCONTINUED | OUTPATIENT
Start: 2023-01-22 | End: 2023-01-24 | Stop reason: HOSPADM

## 2023-01-22 RX ORDER — HYDROMORPHONE HYDROCHLORIDE 1 MG/ML
0.2 INJECTION, SOLUTION INTRAMUSCULAR; INTRAVENOUS; SUBCUTANEOUS
Status: DISCONTINUED | OUTPATIENT
Start: 2023-01-22 | End: 2023-01-22

## 2023-01-22 RX ORDER — MAGNESIUM SULFATE 4 G/50ML
4 INJECTION INTRAVENOUS ONCE
Status: COMPLETED | OUTPATIENT
Start: 2023-01-22 | End: 2023-01-22

## 2023-01-22 RX ORDER — ONDANSETRON 2 MG/ML
INJECTION INTRAMUSCULAR; INTRAVENOUS PRN
Status: DISCONTINUED | OUTPATIENT
Start: 2023-01-22 | End: 2023-01-22

## 2023-01-22 RX ORDER — PROPOFOL 10 MG/ML
INJECTION, EMULSION INTRAVENOUS PRN
Status: DISCONTINUED | OUTPATIENT
Start: 2023-01-22 | End: 2023-01-22

## 2023-01-22 RX ORDER — CEFAZOLIN SODIUM/WATER 3 G/30 ML
3 SYRINGE (ML) INTRAVENOUS
Status: DISCONTINUED | OUTPATIENT
Start: 2023-01-22 | End: 2023-01-22 | Stop reason: HOSPADM

## 2023-01-22 RX ORDER — ALLOPURINOL 300 MG/1
300 TABLET ORAL DAILY
Status: DISCONTINUED | OUTPATIENT
Start: 2023-01-22 | End: 2023-01-24 | Stop reason: HOSPADM

## 2023-01-22 RX ORDER — SERTRALINE HYDROCHLORIDE 25 MG/1
25 TABLET, FILM COATED ORAL DAILY
Status: DISCONTINUED | OUTPATIENT
Start: 2023-01-22 | End: 2023-01-24 | Stop reason: HOSPADM

## 2023-01-22 RX ORDER — OXYCODONE HYDROCHLORIDE 5 MG/1
5 TABLET ORAL EVERY 4 HOURS PRN
Status: DISCONTINUED | OUTPATIENT
Start: 2023-01-22 | End: 2023-01-24 | Stop reason: HOSPADM

## 2023-01-22 RX ORDER — ONDANSETRON 4 MG/1
4 TABLET, ORALLY DISINTEGRATING ORAL EVERY 6 HOURS PRN
Status: DISCONTINUED | OUTPATIENT
Start: 2023-01-22 | End: 2023-01-22

## 2023-01-22 RX ORDER — ACETAMINOPHEN 325 MG/1
650 TABLET ORAL EVERY 4 HOURS PRN
Status: DISCONTINUED | OUTPATIENT
Start: 2023-01-25 | End: 2023-01-24 | Stop reason: HOSPADM

## 2023-01-22 RX ORDER — PIPERACILLIN SODIUM, TAZOBACTAM SODIUM 3; .375 G/15ML; G/15ML
3.38 INJECTION, POWDER, LYOPHILIZED, FOR SOLUTION INTRAVENOUS EVERY 8 HOURS
Status: DISCONTINUED | OUTPATIENT
Start: 2023-01-22 | End: 2023-01-24 | Stop reason: HOSPADM

## 2023-01-22 RX ORDER — MORPHINE SULFATE 4 MG/ML
4 INJECTION, SOLUTION INTRAMUSCULAR; INTRAVENOUS ONCE
Status: COMPLETED | OUTPATIENT
Start: 2023-01-22 | End: 2023-01-22

## 2023-01-22 RX ORDER — BUPIVACAINE HYDROCHLORIDE 2.5 MG/ML
INJECTION, SOLUTION INFILTRATION; PERINEURAL PRN
Status: DISCONTINUED | OUTPATIENT
Start: 2023-01-22 | End: 2023-01-22 | Stop reason: HOSPADM

## 2023-01-22 RX ORDER — SODIUM CHLORIDE, SODIUM LACTATE, POTASSIUM CHLORIDE, AND CALCIUM CHLORIDE .6; .31; .03; .02 G/100ML; G/100ML; G/100ML; G/100ML
IRRIGANT IRRIGATION PRN
Status: DISCONTINUED | OUTPATIENT
Start: 2023-01-22 | End: 2023-01-22 | Stop reason: HOSPADM

## 2023-01-22 RX ORDER — LIDOCAINE 40 MG/G
CREAM TOPICAL
Status: DISCONTINUED | OUTPATIENT
Start: 2023-01-22 | End: 2023-01-24 | Stop reason: HOSPADM

## 2023-01-22 RX ORDER — NALOXONE HYDROCHLORIDE 0.4 MG/ML
0.2 INJECTION, SOLUTION INTRAMUSCULAR; INTRAVENOUS; SUBCUTANEOUS
Status: DISCONTINUED | OUTPATIENT
Start: 2023-01-22 | End: 2023-01-24 | Stop reason: HOSPADM

## 2023-01-22 RX ORDER — ACETAMINOPHEN 325 MG/1
975 TABLET ORAL ONCE
Status: COMPLETED | OUTPATIENT
Start: 2023-01-22 | End: 2023-01-22

## 2023-01-22 RX ORDER — LIDOCAINE HYDROCHLORIDE 10 MG/ML
INJECTION, SOLUTION INFILTRATION; PERINEURAL PRN
Status: DISCONTINUED | OUTPATIENT
Start: 2023-01-22 | End: 2023-01-22

## 2023-01-22 RX ORDER — DEXAMETHASONE SODIUM PHOSPHATE 4 MG/ML
INJECTION, SOLUTION INTRA-ARTICULAR; INTRALESIONAL; INTRAMUSCULAR; INTRAVENOUS; SOFT TISSUE PRN
Status: DISCONTINUED | OUTPATIENT
Start: 2023-01-22 | End: 2023-01-22

## 2023-01-22 RX ORDER — BISACODYL 10 MG
10 SUPPOSITORY, RECTAL RECTAL DAILY PRN
Status: DISCONTINUED | OUTPATIENT
Start: 2023-01-22 | End: 2023-01-24 | Stop reason: HOSPADM

## 2023-01-22 RX ORDER — LISINOPRIL 5 MG/1
10 TABLET ORAL DAILY
Status: DISCONTINUED | OUTPATIENT
Start: 2023-01-22 | End: 2023-01-24 | Stop reason: HOSPADM

## 2023-01-22 RX ORDER — IOPAMIDOL 755 MG/ML
100 INJECTION, SOLUTION INTRAVASCULAR ONCE
Status: COMPLETED | OUTPATIENT
Start: 2023-01-22 | End: 2023-01-22

## 2023-01-22 RX ORDER — ACETAMINOPHEN 650 MG/1
650 SUPPOSITORY RECTAL EVERY 6 HOURS PRN
Status: DISCONTINUED | OUTPATIENT
Start: 2023-01-22 | End: 2023-01-24 | Stop reason: HOSPADM

## 2023-01-22 RX ORDER — SODIUM CHLORIDE 9 MG/ML
INJECTION, SOLUTION INTRAVENOUS CONTINUOUS
Status: ACTIVE | OUTPATIENT
Start: 2023-01-22 | End: 2023-01-22

## 2023-01-22 RX ORDER — LIDOCAINE 40 MG/G
CREAM TOPICAL
Status: DISCONTINUED | OUTPATIENT
Start: 2023-01-22 | End: 2023-01-22 | Stop reason: HOSPADM

## 2023-01-22 RX ORDER — ONDANSETRON 2 MG/ML
4 INJECTION INTRAMUSCULAR; INTRAVENOUS EVERY 6 HOURS PRN
Status: DISCONTINUED | OUTPATIENT
Start: 2023-01-22 | End: 2023-01-24 | Stop reason: HOSPADM

## 2023-01-22 RX ORDER — HYDROMORPHONE HYDROCHLORIDE 1 MG/ML
0.2 INJECTION, SOLUTION INTRAMUSCULAR; INTRAVENOUS; SUBCUTANEOUS EVERY 5 MIN PRN
Status: DISCONTINUED | OUTPATIENT
Start: 2023-01-22 | End: 2023-01-22 | Stop reason: HOSPADM

## 2023-01-22 RX ORDER — CEFAZOLIN SODIUM/WATER 3 G/30 ML
3 SYRINGE (ML) INTRAVENOUS SEE ADMIN INSTRUCTIONS
Status: DISCONTINUED | OUTPATIENT
Start: 2023-01-22 | End: 2023-01-22 | Stop reason: HOSPADM

## 2023-01-22 RX ORDER — FENTANYL CITRATE 50 UG/ML
50 INJECTION, SOLUTION INTRAMUSCULAR; INTRAVENOUS EVERY 5 MIN PRN
Status: DISCONTINUED | OUTPATIENT
Start: 2023-01-22 | End: 2023-01-22 | Stop reason: HOSPADM

## 2023-01-22 RX ORDER — ENOXAPARIN SODIUM 100 MG/ML
40 INJECTION SUBCUTANEOUS EVERY 24 HOURS
Status: DISCONTINUED | OUTPATIENT
Start: 2023-01-23 | End: 2023-01-24 | Stop reason: HOSPADM

## 2023-01-22 RX ORDER — FENTANYL CITRATE 50 UG/ML
INJECTION, SOLUTION INTRAMUSCULAR; INTRAVENOUS PRN
Status: DISCONTINUED | OUTPATIENT
Start: 2023-01-22 | End: 2023-01-22

## 2023-01-22 RX ORDER — ONDANSETRON 2 MG/ML
4 INJECTION INTRAMUSCULAR; INTRAVENOUS EVERY 30 MIN PRN
Status: DISCONTINUED | OUTPATIENT
Start: 2023-01-22 | End: 2023-01-22 | Stop reason: HOSPADM

## 2023-01-22 RX ORDER — NALOXONE HYDROCHLORIDE 0.4 MG/ML
0.4 INJECTION, SOLUTION INTRAMUSCULAR; INTRAVENOUS; SUBCUTANEOUS
Status: DISCONTINUED | OUTPATIENT
Start: 2023-01-22 | End: 2023-01-24 | Stop reason: HOSPADM

## 2023-01-22 RX ORDER — FENTANYL CITRATE 50 UG/ML
25 INJECTION, SOLUTION INTRAMUSCULAR; INTRAVENOUS EVERY 5 MIN PRN
Status: DISCONTINUED | OUTPATIENT
Start: 2023-01-22 | End: 2023-01-22 | Stop reason: HOSPADM

## 2023-01-22 RX ORDER — KETOROLAC TROMETHAMINE 15 MG/ML
15 INJECTION, SOLUTION INTRAMUSCULAR; INTRAVENOUS EVERY 6 HOURS PRN
Status: DISCONTINUED | OUTPATIENT
Start: 2023-01-22 | End: 2023-01-22

## 2023-01-22 RX ORDER — ONDANSETRON 2 MG/ML
4 INJECTION INTRAMUSCULAR; INTRAVENOUS EVERY 6 HOURS PRN
Status: DISCONTINUED | OUTPATIENT
Start: 2023-01-22 | End: 2023-01-22

## 2023-01-22 RX ORDER — PIPERACILLIN SODIUM, TAZOBACTAM SODIUM 3; .375 G/15ML; G/15ML
3.38 INJECTION, POWDER, LYOPHILIZED, FOR SOLUTION INTRAVENOUS ONCE
Status: COMPLETED | OUTPATIENT
Start: 2023-01-22 | End: 2023-01-22

## 2023-01-22 RX ORDER — HYDROCHLOROTHIAZIDE 25 MG/1
25 TABLET ORAL DAILY
Status: DISCONTINUED | OUTPATIENT
Start: 2023-01-22 | End: 2023-01-24 | Stop reason: HOSPADM

## 2023-01-22 RX ORDER — TIMOLOL MALEATE 5 MG/ML
1 SOLUTION/ DROPS OPHTHALMIC DAILY
Status: DISCONTINUED | OUTPATIENT
Start: 2023-01-22 | End: 2023-01-24 | Stop reason: HOSPADM

## 2023-01-22 RX ORDER — ENOXAPARIN SODIUM 100 MG/ML
40 INJECTION SUBCUTANEOUS EVERY 24 HOURS
Status: DISCONTINUED | OUTPATIENT
Start: 2023-01-22 | End: 2023-01-22

## 2023-01-22 RX ORDER — HYDROMORPHONE HCL IN WATER/PF 6 MG/30 ML
0.2 PATIENT CONTROLLED ANALGESIA SYRINGE INTRAVENOUS
Status: DISCONTINUED | OUTPATIENT
Start: 2023-01-22 | End: 2023-01-24 | Stop reason: HOSPADM

## 2023-01-22 RX ORDER — DEXTROSE MONOHYDRATE 25 G/50ML
25-50 INJECTION, SOLUTION INTRAVENOUS
Status: DISCONTINUED | OUTPATIENT
Start: 2023-01-22 | End: 2023-01-23

## 2023-01-22 RX ORDER — PROCHLORPERAZINE MALEATE 5 MG
5 TABLET ORAL EVERY 6 HOURS PRN
Status: DISCONTINUED | OUTPATIENT
Start: 2023-01-22 | End: 2023-01-24 | Stop reason: HOSPADM

## 2023-01-22 RX ORDER — HYDROMORPHONE HYDROCHLORIDE 1 MG/ML
0.4 INJECTION, SOLUTION INTRAMUSCULAR; INTRAVENOUS; SUBCUTANEOUS EVERY 5 MIN PRN
Status: DISCONTINUED | OUTPATIENT
Start: 2023-01-22 | End: 2023-01-22 | Stop reason: HOSPADM

## 2023-01-22 RX ORDER — NICOTINE POLACRILEX 4 MG
15-30 LOZENGE BUCCAL
Status: DISCONTINUED | OUTPATIENT
Start: 2023-01-22 | End: 2023-01-23

## 2023-01-22 RX ORDER — OXYCODONE HYDROCHLORIDE 5 MG/1
10 TABLET ORAL EVERY 4 HOURS PRN
Status: DISCONTINUED | OUTPATIENT
Start: 2023-01-22 | End: 2023-01-24 | Stop reason: HOSPADM

## 2023-01-22 RX ORDER — ACETAMINOPHEN 325 MG/1
975 TABLET ORAL EVERY 8 HOURS
Status: DISCONTINUED | OUTPATIENT
Start: 2023-01-22 | End: 2023-01-24 | Stop reason: HOSPADM

## 2023-01-22 RX ORDER — SODIUM CHLORIDE, SODIUM LACTATE, POTASSIUM CHLORIDE, CALCIUM CHLORIDE 600; 310; 30; 20 MG/100ML; MG/100ML; MG/100ML; MG/100ML
INJECTION, SOLUTION INTRAVENOUS CONTINUOUS
Status: DISCONTINUED | OUTPATIENT
Start: 2023-01-22 | End: 2023-01-22 | Stop reason: HOSPADM

## 2023-01-22 RX ORDER — ONDANSETRON 4 MG/1
4 TABLET, ORALLY DISINTEGRATING ORAL EVERY 30 MIN PRN
Status: DISCONTINUED | OUTPATIENT
Start: 2023-01-22 | End: 2023-01-22 | Stop reason: HOSPADM

## 2023-01-22 RX ORDER — ATORVASTATIN CALCIUM 10 MG/1
10 TABLET, FILM COATED ORAL DAILY
Status: DISCONTINUED | OUTPATIENT
Start: 2023-01-22 | End: 2023-01-24 | Stop reason: HOSPADM

## 2023-01-22 RX ORDER — LIDOCAINE 40 MG/G
CREAM TOPICAL
Status: DISCONTINUED | OUTPATIENT
Start: 2023-01-22 | End: 2023-01-24

## 2023-01-22 RX ORDER — ACETAMINOPHEN 325 MG/1
650 TABLET ORAL EVERY 6 HOURS PRN
Status: DISCONTINUED | OUTPATIENT
Start: 2023-01-22 | End: 2023-01-22

## 2023-01-22 RX ORDER — SODIUM CHLORIDE, SODIUM LACTATE, POTASSIUM CHLORIDE, CALCIUM CHLORIDE 600; 310; 30; 20 MG/100ML; MG/100ML; MG/100ML; MG/100ML
INJECTION, SOLUTION INTRAVENOUS CONTINUOUS
Status: DISCONTINUED | OUTPATIENT
Start: 2023-01-22 | End: 2023-01-22

## 2023-01-22 RX ORDER — AMOXICILLIN 250 MG
1 CAPSULE ORAL 2 TIMES DAILY
Status: DISCONTINUED | OUTPATIENT
Start: 2023-01-22 | End: 2023-01-24 | Stop reason: HOSPADM

## 2023-01-22 RX ORDER — HYDROMORPHONE HCL IN WATER/PF 6 MG/30 ML
0.4 PATIENT CONTROLLED ANALGESIA SYRINGE INTRAVENOUS
Status: DISCONTINUED | OUTPATIENT
Start: 2023-01-22 | End: 2023-01-24 | Stop reason: HOSPADM

## 2023-01-22 RX ORDER — ONDANSETRON 2 MG/ML
4 INJECTION INTRAMUSCULAR; INTRAVENOUS ONCE
Status: COMPLETED | OUTPATIENT
Start: 2023-01-22 | End: 2023-01-22

## 2023-01-22 RX ADMIN — ONDANSETRON 4 MG: 2 INJECTION INTRAMUSCULAR; INTRAVENOUS at 17:25

## 2023-01-22 RX ADMIN — ACETAMINOPHEN 975 MG: 325 TABLET ORAL at 16:26

## 2023-01-22 RX ADMIN — PIPERACILLIN AND TAZOBACTAM 3.38 G: 3; .375 INJECTION, POWDER, LYOPHILIZED, FOR SOLUTION INTRAVENOUS at 16:27

## 2023-01-22 RX ADMIN — ROCURONIUM BROMIDE 50 MG: 50 INJECTION, SOLUTION INTRAVENOUS at 17:15

## 2023-01-22 RX ADMIN — FENTANYL CITRATE 50 MCG: 50 INJECTION, SOLUTION INTRAMUSCULAR; INTRAVENOUS at 18:34

## 2023-01-22 RX ADMIN — PHENYLEPHRINE HYDROCHLORIDE 200 MCG: 10 INJECTION INTRAVENOUS at 17:25

## 2023-01-22 RX ADMIN — SENNOSIDES AND DOCUSATE SODIUM 1 TABLET: 50; 8.6 TABLET ORAL at 21:07

## 2023-01-22 RX ADMIN — SODIUM CHLORIDE: 9 INJECTION, SOLUTION INTRAVENOUS at 13:13

## 2023-01-22 RX ADMIN — SODIUM CHLORIDE, POTASSIUM CHLORIDE, SODIUM LACTATE AND CALCIUM CHLORIDE: 600; 310; 30; 20 INJECTION, SOLUTION INTRAVENOUS at 17:57

## 2023-01-22 RX ADMIN — OXYCODONE HYDROCHLORIDE 5 MG: 5 TABLET ORAL at 21:07

## 2023-01-22 RX ADMIN — PHENYLEPHRINE HYDROCHLORIDE 200 MCG: 10 INJECTION INTRAVENOUS at 18:04

## 2023-01-22 RX ADMIN — IOPAMIDOL 100 ML: 755 INJECTION, SOLUTION INTRAVENOUS at 08:19

## 2023-01-22 RX ADMIN — FENTANYL CITRATE 50 MCG: 50 INJECTION, SOLUTION INTRAMUSCULAR; INTRAVENOUS at 17:15

## 2023-01-22 RX ADMIN — PHENYLEPHRINE HYDROCHLORIDE 200 MCG: 10 INJECTION INTRAVENOUS at 17:31

## 2023-01-22 RX ADMIN — MORPHINE SULFATE 4 MG: 4 INJECTION INTRAVENOUS at 09:08

## 2023-01-22 RX ADMIN — MAGNESIUM SULFATE HEPTAHYDRATE 4 G: 80 INJECTION, SOLUTION INTRAVENOUS at 16:26

## 2023-01-22 RX ADMIN — SODIUM CHLORIDE, POTASSIUM CHLORIDE, SODIUM LACTATE AND CALCIUM CHLORIDE: 600; 310; 30; 20 INJECTION, SOLUTION INTRAVENOUS at 16:26

## 2023-01-22 RX ADMIN — SODIUM CHLORIDE 500 ML: 9 INJECTION, SOLUTION INTRAVENOUS at 06:39

## 2023-01-22 RX ADMIN — PHENYLEPHRINE HYDROCHLORIDE 200 MCG: 10 INJECTION INTRAVENOUS at 17:22

## 2023-01-22 RX ADMIN — DEXAMETHASONE SODIUM PHOSPHATE 4 MG: 4 INJECTION, SOLUTION INTRA-ARTICULAR; INTRALESIONAL; INTRAMUSCULAR; INTRAVENOUS; SOFT TISSUE at 17:15

## 2023-01-22 RX ADMIN — PIPERACILLIN AND TAZOBACTAM 3.38 G: 3; .375 INJECTION, POWDER, LYOPHILIZED, FOR SOLUTION INTRAVENOUS at 09:39

## 2023-01-22 RX ADMIN — SUGAMMADEX 200 MG: 100 INJECTION, SOLUTION INTRAVENOUS at 18:35

## 2023-01-22 RX ADMIN — ACETAMINOPHEN 975 MG: 325 TABLET, FILM COATED ORAL at 21:07

## 2023-01-22 RX ADMIN — ONDANSETRON 4 MG: 2 INJECTION INTRAMUSCULAR; INTRAVENOUS at 06:38

## 2023-01-22 RX ADMIN — LIDOCAINE HYDROCHLORIDE 30 MG: 10 INJECTION, SOLUTION INFILTRATION; PERINEURAL at 17:15

## 2023-01-22 RX ADMIN — KETOROLAC TROMETHAMINE 15 MG: 15 INJECTION, SOLUTION INTRAMUSCULAR; INTRAVENOUS at 06:39

## 2023-01-22 RX ADMIN — TIMOLOL MALEATE 1 DROP: 5 SOLUTION OPHTHALMIC at 13:15

## 2023-01-22 RX ADMIN — PHENYLEPHRINE HYDROCHLORIDE 200 MCG: 10 INJECTION INTRAVENOUS at 17:27

## 2023-01-22 RX ADMIN — PROPOFOL 160 MG: 10 INJECTION, EMULSION INTRAVENOUS at 17:15

## 2023-01-22 RX ADMIN — SODIUM CHLORIDE, POTASSIUM CHLORIDE, SODIUM LACTATE AND CALCIUM CHLORIDE: 600; 310; 30; 20 INJECTION, SOLUTION INTRAVENOUS at 18:30

## 2023-01-22 ASSESSMENT — ACTIVITIES OF DAILY LIVING (ADL)
ADLS_ACUITY_SCORE: 35
ADLS_ACUITY_SCORE: 35
DEPENDENT_IADLS:: INDEPENDENT
ADLS_ACUITY_SCORE: 37
ADLS_ACUITY_SCORE: 35
ADLS_ACUITY_SCORE: 35
ADLS_ACUITY_SCORE: 37
ADLS_ACUITY_SCORE: 35
ADLS_ACUITY_SCORE: 35
ADLS_ACUITY_SCORE: 37

## 2023-01-22 ASSESSMENT — ENCOUNTER SYMPTOMS
ABDOMINAL PAIN: 1
BLOOD IN STOOL: 0
CONSTIPATION: 0
DYSURIA: 0
BACK PAIN: 1
NUMBNESS: 0
COUGH: 0
DIARRHEA: 0
NAUSEA: 1
FEVER: 0
SORE THROAT: 0
WEAKNESS: 0
CHILLS: 0
HEMATURIA: 0
VOMITING: 1

## 2023-01-22 NOTE — H&P
Admission History and Physical   Chaparro Guillen,  1945, MRN [id number]      Hypomagnesemia [E83.42]    PCP: Ba Miguel, @JD@, [phone]   Code status:  Full Code       Extended Emergency Contact Information  Primary Emergency Contact: Cole Guillen   [facility]  Home Phone: [phone]  Relation: Brother       Assessment and Plan   hyperlipidemia, type 2 diabetes mellitus, gout and anxiety who is presented to the hospital complaining of abdominal pain and found that he has acute cholecystitis.  Assessment:  Principal Problem:    Acute cholecystitis  Active Problems:    Gout    Essential hypertension    Type 2 diabetes mellitus (H)    BPH (benign prostatic hyperplasia)    Anxiety    Morbid obesity (H)    Mixed hyperlipidemia    Abdominal pain, right upper quadrant    Nausea and vomiting    Plan:  CT scan showing cholelithiasis with fat stranding suggested acute cholecystitis  Start the patient on Zosyn  Pain medication and IV Zofran as needed for nausea  Keep the patient on IV fluid  Surgery consulted to see the patient we appreciate their help  CT scan showed bilateral adrenal nodules need follow-up  Resume his home medication for blood pressure  Hold off metformin and we will start the patient on insulin sliding scale and keep watching blood pressure  We encouraged the patient to lose weight  Resume his Lipitor  Resume allopurinol for gout  Lovenox for DVT prophylaxis  Pt would be admitted as observation and will likely stay for less than 2 midnights.         Chief Complaint:  ABDOMINAL PAIN     HPI:    Informant is patient reliable  Chaparro Guillen is a 77 year old old male with significant past medical history of hypertension, hyperlipidemia, type 2 diabetes mellitus, gout and anxiety who is presented to the hospital complaining of abdominal pain and found that he has acute cholecystitis.  Patient said last night before he went to sleep he started having back pain and afterward  he started feeling right upper quadrant pain, sharp, constant, 8/10 in severity, going toward epigastric and lower abdomen, associated with nausea and vomiting, denied any fever or chills, has no similar pain like this before, denied any constipation or diarrhea, denied any heartburn.          Medical History      Past Medical History:   Diagnosis Date     DM2 (diabetes mellitus, type 2) (H)      Essential hypertension      Gout       Family History  Reviewed, and family history is not on file.          Allergies  No Known Allergies Social History  Reviewed, and he  reports that he has never smoked. He has never used smokeless tobacco. He reports that he does not drink alcohol and does not use drugs.    Review of Systems:  10-point ROS negative, except as noted in HPI            Prior to Admission Medications   (Not in a hospital admission)         Physical Exam:  Temp:  [97.1  F (36.2  C)] 97.1  F (36.2  C)  Pulse:  [66-84] 66  Resp:  [16-33] 16  BP: (108-142)/(57-93) 135/63  SpO2:  [91 %-98 %] 93 %  Wt Readings from Last 5 Encounters:   01/22/23 142.9 kg (315 lb)   09/02/22 144.7 kg (319 lb)   03/02/22 142.5 kg (314 lb 2 oz)   08/27/21 (!) 152.6 kg (336 lb 6.4 oz)   02/24/21 148.3 kg (327 lb)     Body mass index is 46.52 kg/m .  Alert, oriented*3, morbid obese  No pallor, icterus, clubbing, cyanosis  Well-nourished  No sinus tenderness  Moist mucus membranes  Neck supple, large neck  CVS: S1 S2-N, no murmurs, gallops, rubs  Resp: B/L vesicular breath sounds, no wheezing, crackles   Abd: soft, tenderness of the right upper quadrant, no guarding or rigidity  Neuro: no involuntary movements such as tremors  Vasc: no leg edema  No clubbing  Skin--chronic bilateral lower extremity venous stasis     Pertinent Labs  Lab Results:  Recent Results (from the past 24 hour(s))   Basic metabolic panel    Collection Time: 01/22/23  6:38 AM   Result Value Ref Range    Sodium 137 136 - 145 mmol/L    Potassium 3.5 3.4 - 5.3 mmol/L     Chloride 96 (L) 98 - 107 mmol/L    Carbon Dioxide (CO2) 25 22 - 29 mmol/L    Anion Gap 16 (H) 7 - 15 mmol/L    Urea Nitrogen 20.1 8.0 - 23.0 mg/dL    Creatinine 1.02 0.67 - 1.17 mg/dL    Calcium 9.2 8.8 - 10.2 mg/dL    Glucose 144 (H) 70 - 99 mg/dL    GFR Estimate 76 >60 mL/min/1.73m2   Hepatic function panel    Collection Time: 01/22/23  6:38 AM   Result Value Ref Range    Protein Total 7.7 6.4 - 8.3 g/dL    Albumin 3.9 3.5 - 5.2 g/dL    Bilirubin Total 0.9 <=1.2 mg/dL    Alkaline Phosphatase 59 40 - 129 U/L    AST 22 10 - 50 U/L    ALT 24 10 - 50 U/L    Bilirubin Direct 0.26 0.00 - 0.30 mg/dL   Lipase    Collection Time: 01/22/23  6:38 AM   Result Value Ref Range    Lipase 27 13 - 60 U/L   Magnesium    Collection Time: 01/22/23  6:38 AM   Result Value Ref Range    Magnesium 1.6 (L) 1.7 - 2.3 mg/dL   Troponin T, High Sensitivity (now)    Collection Time: 01/22/23  6:38 AM   Result Value Ref Range    Troponin T, High Sensitivity 12 <=22 ng/L   CBC with platelets and differential    Collection Time: 01/22/23  6:38 AM   Result Value Ref Range    WBC Count 20.8 (H) 4.0 - 11.0 10e3/uL    RBC Count 5.61 4.40 - 5.90 10e6/uL    Hemoglobin 16.8 13.3 - 17.7 g/dL    Hematocrit 48.9 40.0 - 53.0 %    MCV 87 78 - 100 fL    MCH 29.9 26.5 - 33.0 pg    MCHC 34.4 31.5 - 36.5 g/dL    RDW 14.1 10.0 - 15.0 %    Platelet Count 296 150 - 450 10e3/uL    % Neutrophils 86 %    % Lymphocytes 8 %    % Monocytes 5 %    % Eosinophils 0 %    % Basophils 0 %    % Immature Granulocytes 1 %    NRBCs per 100 WBC 0 <1 /100    Absolute Neutrophils 17.9 (H) 1.6 - 8.3 10e3/uL    Absolute Lymphocytes 1.7 0.8 - 5.3 10e3/uL    Absolute Monocytes 1.1 0.0 - 1.3 10e3/uL    Absolute Eosinophils 0.0 0.0 - 0.7 10e3/uL    Absolute Basophils 0.1 0.0 - 0.2 10e3/uL    Absolute Immature Granulocytes 0.1 <=0.4 10e3/uL    Absolute NRBCs 0.0 10e3/uL   Symptomatic Influenza A/B & SARS-CoV2 (COVID-19) Virus PCR Multiplex Nasopharyngeal    Collection Time: 01/22/23   6:39 AM    Specimen: Nasopharyngeal; Swab   Result Value Ref Range    Influenza A PCR Negative Negative    Influenza B PCR Negative Negative    RSV PCR Negative Negative    SARS CoV2 PCR Negative Negative     No results found for: HGBA1C  No results found for: IRON  No results found for: CKTOTAL, CKMB, TROPONINI  Lab Results   Component Value Date    TSH 3.42 08/27/2021       Pertinent Radiology  Radiology Results:  CTA Chest Abdomen Pelvis w Contrast    Result Date: 1/22/2023  EXAM: CTA CHEST ABDOMEN PELVIS W CONTRAST LOCATION: [facility] DATE/TIME: 1/22/2023 8:19 AM INDICATION: Back pain with abdominal pain and vomiting evaluate for dissection, and if bowel ischemia, normal gallbladder appendix COMPARISON: None. TECHNIQUE: CT angiogram chest abdomen pelvis during arterial phase of injection of IV contrast. 2D and 3D MIP reconstructions were performed by the CT technologist. Dose reduction techniques were used. CONTRAST: 100ml isovue 370 FINDINGS: CT ANGIOGRAM CHEST, ABDOMEN, AND PELVIS: Unremarkable. No evidence of aortic aneurysm or dissection. LUNGS AND PLEURA: Normal. MEDIASTINUM/AXILLAE: No pericardial effusion. No lymphadenopathy. CORONARY ARTERY CALCIFICATION: Moderate. HEPATOBILIARY: Hepatic steatosis. Cholelithiasis. Mild pericholecystic fat stranding. PANCREAS: Normal. SPLEEN: Normal. ADRENAL GLANDS: Right adrenal nodule measuring 1.1 cm. Left adrenal nodule measuring 1.3 cm. KIDNEYS/BLADDER: Complex septated 1.5 cm cyst at the anterior interpolar region of the left kidney. No hydronephrosis. BOWEL: No obstruction or inflammatory change. LYMPH NODES: Normal. PELVIC ORGANS: Normal. MUSCULOSKELETAL: Unremarkable     IMPRESSION: 1.  Cholelithiasis and mild pericholecystic fat stranding suspicious for acute cholecystitis. 2.  Bilateral adrenal nodules measure 1.1 cm on the right and 1.3 cm on the left. Recommend follow-up per guidelines below. 3.  Complex septated left renal cyst.  Recommend nonemergent follow-up renal mass protocol MRI to further characterize. REFERENCE: Management of Incidental Adrenal Masses: A White Paper of the ACR Incidental Findings Committee. J Am Soni Radiol 2017;14:7515-8833. ? 1 cm and < 4 cm: No prior imaging and no history of cancer: 1-2 cm: probably benign. Consider 12-month CT adrenal follow-up. >2cm, <4 cm: Adrenal CT. No prior imaging and history of cancer: Adrenal CT Prior Imaging: Stable for 1 year: Benign. No follow-up. New or enlarging: --Adrenal CT or resection if no history of cancer. --PET/CT or biopsy if positive history of cancer.

## 2023-01-22 NOTE — ED PROVIDER NOTES
EMERGENCY DEPARTMENT ENCOUNTER      NAME: Chaparro Guillen  AGE: 77 year old male  YOB: 1945  MRN: [id number]  EVALUATION DATE & TIME: No admission date for patient encounter.    PCP: Ba Miguel    ED PROVIDER: Marya Butt M.D.      CHIEF COMPLAINT     Chief Complaint   Patient presents with     Nausea & Vomiting     Abdominal Pain         FINAL IMPRESSION:     1. Acute cholecystitis    2. Adrenal nodule (H)    3. Renal cyst, left    4. History of diabetes mellitus    5. Morbid obesity (H)    6. Hypomagnesemia          MEDICAL DECISION MAKING:       Pertinent Labs & Imaging studies reviewed. (See chart for details)    77 year old male presents to the Emergency Department for evaluation of abdominal pain vomiting    ED Course as of 01/22/23 0933   Sun Jan 22, 2023   0617 Mr. Guillen 77-year-old male who presents complaining of lower back pain and abdominal pain started at 6 PM yesterday no trauma.   0617 Pain is located in the lower back felt like an ache he then felt nauseous and started dry heaving he did notice that when he pushes on anterior part of the abdomen it hurt.   0617 No systemic symptoms no fevers no chills no headache no chest pain or shortness of breath no dysuria no hematuria no leg swelling or rashes   0618 Diabetic state his sugars have been fine   0618 No recent travel has been immunized against COVID and influenza   0618 No hematuria.  States he had prostate surgery.   0618 Presents to the emergency department by himself   0618 I reviewed patient's previous records clinic note 2021 hemoglobin A1c 5.9 normal liver function test and chemistry.   0618 On examination he is nontoxic cooperative and pleasant no respiratory distress elevated BMI   0619 Dry Mucous membranes cardiopulmonary no acute normalities   0619 No midline cervical thoracic lumbar tenderness palpation bilateral paravertebral lumbar tenderness palpation no diffuse tenderness palpation of the abdomen  without guarding or rebound surgical scar   0619 Differential diagnosis for low back pain with subsequent abdominal pain nausea and vomiting in a diabetic patient includes but admitted to   0619 Appendicitis diverticulitis biliary etiology pancreatitis dissection acute coronary syndrome epidural abscess discitis renal colic, and others.   0619 We will start an IV will check labs.  Will hydrate patient will do Zofran for nausea   0652 Reevaluated.  States feels nauseous.  No chest pain or shortness of breath lower extremity without edema he has excellent pulses but there is discoloration says this has happened before when he had edema.   examination normal male anatomy.   0652 Labs reveal an elevated white blood cell count at 20.8 with a normal hemoglobin and platelets.   0741 Normal Liver function test normal renal functions low magnesium will replace.   0741 Carbon Dioxide (CO2): 25   0742 MCV: 87   0742 [id number] symptoms have been going on since graded as 6 hours.  No chest pain because he is diabetic and entertain the possibility to avoid angina equivalent.   0827 CT Chest reviewed by me no pneumonia no pneumothorax.  No dissection.   0829 CT abdomen and pelvis no free air gallstones.   0834 There is also some inflammation on the left side of colon.   0903 Formal CT reading by radiologist revealed cholecystitis.  Call placed to general surgery and hospitalist.  Patient updated given Zosyn.   0913 spoke with Dr. Hill general surgeon who agrees with plan.   0921 Spoke with hospitalist was sent patient MedSurg observation.   0933 Clinical impression and decision making  27-year-old male with history of diabetes hypertension hypercholesterolemia and obesity presents here complaining of bilateral back pain with diffuse abdominal tenderness palpation associated with nausea.  Protuberant abdomen with diffuse tenderness palpation.  Given back pain CTA was done no evidence of dissection does have cholelithiasis  with some edema clinically cholecystitis.  Elevated white blood cell count.  Slightly low magnesium this was replaced.  Started on Zosyn hemodynamically stable.  Admitted for appetite evaluation.       Vital Signs: Reviewed  EKG: Sinus rhythm normal anterior progression normal axis PVCs  Imaging:  Home Meds:  ED meds/abx:  Fluids:    Labs  K 3.5  Cr 1.02  Wbc 20.8  Hgb16.8  Platelets 296  Mg 1.6  lipase    Medical Decision Making    History:    Supplemental history from: Documented in chart, if applicable    External Record(s) reviewed: Documented in chart, if applicable.    Work Up:    Chart documentation includes differential considered and any EKGs or imaging independently interpreted by provider, where specified.    In additional to work up documented, I considered the following work up: Documented in chart, if applicable.    External consultation:    Discussion of management with another provider: Documented in chart, if applicable and General Surgery    Complicating factors:    Care impacted by chronic illness: Diabetes, Hypertension and Other: Morbid Obesity    Care affected by social determinants of health: N/A    Disposition considerations: Admit.           Review of Previous Records  Per chart review, patient was seen by his primary care provider, Dr. Miguel, at [facility] on 09/02/2022. Patient has history of DM2, HTN, and morbid obesity. Labs were performed. A1C was 5.8. Urine showed no protein spilling of concern.       Consults  Dr. Hill, General Surgery  Dr. Soto, Hospitalist    ED COURSE     6:09 AM I met with the patient, obtained history, performed an initial exam, and discussed options and plan for diagnostics and treatment here in the ED.   6:48 AM Reassessed patient. Patient reports still feeling nauseous.   7:32 AM Reassessed patient.   8:59 AM Rechecked patient and updated him on his results. Paged General Surgery and Hospitalist.   9:11 AM Spoke with   Sergio, General Surgery, about patient.   9:20 AM Spoke with Dr. Soto, hospitalist, who agrees to accept patient for admission to [facility] observation.     At the conclusion of the encounter I discussed the results of all of the tests and the disposition. The questions were answered. The patient acknowledged understanding and was agreeable with the care plan.           MEDICATIONS GIVEN IN THE EMERGENCY:     Medications   ketorolac (TORADOL) injection 15 mg (15 mg Intravenous Given 1/22/23 0639)   piperacillin-tazobactam (ZOSYN) 3.375 g vial to attach to  mL bag (has no administration in time range)   atorvastatin (LIPITOR) tablet 10 mg (has no administration in time range)   allopurinol (ZYLOPRIM) tablet 300 mg (has no administration in time range)   furosemide (LASIX) tablet 20 mg (has no administration in time range)   hydrochlorothiazide (HYDRODIURIL) tablet 25 mg (has no administration in time range)   lisinopril (ZESTRIL) tablet 10 mg (has no administration in time range)   sertraline (ZOLOFT) tablet 25 mg (has no administration in time range)   timolol maleate (TIMOPTIC) 0.5 % ophthalmic solution 1 drop (has no administration in time range)   ondansetron (ZOFRAN) injection 4 mg (4 mg Intravenous Given 1/22/23 0638)   0.9% sodium chloride BOLUS (0 mLs Intravenous Stopped 1/22/23 0757)   iopamidol (ISOVUE-370) solution 100 mL (100 mLs Intravenous Given 1/22/23 0819)   morphine (PF) injection 4 mg (4 mg Intravenous Given 1/22/23 0908)       NEW PRESCRIPTIONS STARTED AT TODAY'S ER VISIT     New Prescriptions    No medications on file          =================================================================    HPI     Patient information was obtained from: Patient     Use of : N/A       Per chart review, patient was seen by his primary care provider, Dr. Miguel, at [facility] on 09/02/2022. Patient has history of DM2, HTN, and morbid obesity. Labs were  performed. A1C was 5.8. Urine showed no protein spilling of concern.     Chaparro Guillen is a 77 year old male who presents by car escorted by self for lower back pain and abdominal pain. Patient reports developing sudden lower back pain at 1800 last night. Denies performing exertional activities yesterday. Pain worsens with palpation and is described to be aching. Denies upper or mid-spinal back pain, weakness, numbness or paresthesia. He notes also developing diffuse abdominal pain, nausea, and 1 episode of emesis. He reports producing a normal non-bloody bowel movement last night.     Patient denies recent travel or sick contact. Denies tobacco, alcohol, or drug use. Patient reports history of type 2 diabetes and prostate surgery (6 years ago). He reports still having his appendix and gall bladder.     Patient denies dysuria, hematuria, diarrhea, leg swelling, rash, fever, chills, sore throat, cough, or any additional complaints at this time.     REVIEW OF SYSTEMS   Review of Systems   Constitutional: Negative for chills and fever.   HENT: Negative for sore throat.    Respiratory: Negative for cough.    Cardiovascular: Negative for leg swelling.   Gastrointestinal: Positive for abdominal pain, nausea and vomiting (non-bloody). Negative for blood in stool, constipation and diarrhea.   Genitourinary: Negative for dysuria and hematuria.        Denies paresthesia.    Musculoskeletal: Positive for back pain.   Skin: Negative for rash.   Neurological: Negative for weakness and numbness.   All other systems reviewed and are negative.       PAST MEDICAL HISTORY:     Past Medical History:   Diagnosis Date     DM2 (diabetes mellitus, type 2) (H)      Essential hypertension      Gout        PAST SURGICAL HISTORY:     Past Surgical History:   Procedure Laterality Date     SC GENITAL SURG PROC,MALE UNLISTED N/A 12/27/2017    Procedure: DAVINCI SIMPLE PROSTATECTOMY;  Surgeon: Jaya Cardenas MD;  Location: [facility]  "Main OR;  Service: Urology         CURRENT MEDICATIONS:   Lancets (ONETOUCH DELICA PLUS [id number]) MISC  allopurinol (ZYLOPRIM) 300 MG tablet  atorvastatin (LIPITOR) 10 MG tablet  blood glucose test (ONETOUCH ULTRA TEST) strips  blood glucose test strips  blood-glucose meter (ONETOUCH VERIO IQ METER) Misc  furosemide (LASIX) 20 MG tablet  generic lancets (ONETOUCH ULTRASOFT)  hydrochlorothiazide (HYDRODIURIL) 25 MG tablet  lisinopril (ZESTRIL) 10 MG tablet  metFORMIN (GLUCOPHAGE XR) 500 MG 24 hr tablet  ONETOUCH VERIO IQ test strip  sertraline (ZOLOFT) 25 MG tablet  silver sulfADIAZINE (SILVADENE, SSD) 1 % cream  timolol maleate (TIMOPTIC) 0.5 % ophthalmic solution         ALLERGIES:   No Known Allergies    FAMILY HISTORY:   History reviewed. No pertinent family history.    SOCIAL HISTORY:     Social History     Socioeconomic History     Marital status: Single   Tobacco Use     Smoking status: Never     Smokeless tobacco: Never   Substance and Sexual Activity     Alcohol use: No     Comment: Alcoholic Drinks/day: rarely     Drug use: No       VITALS:   /63   Pulse 66   Temp 97.1  F (36.2  C) (Temporal)   Resp 16   Ht 1.753 m (5' 9\")   Wt 142.9 kg (315 lb)   SpO2 93%   BMI 46.52 kg/m      PHYSICAL EXAM     Physical Exam  Vitals and nursing note reviewed. Exam conducted with a chaperone present.   Constitutional:       General: He is not in acute distress.     Appearance: He is well-developed. He is obese. He is not ill-appearing, toxic-appearing or diaphoretic.   Abdominal:       Neurological:      Mental Status: He is alert.         Physical Exam   Constitutional: Non-toxic appearing, cooperative, and pleasant. Elevated BMI.     Head: Atraumatic.     Nose: Nose normal.     Mouth/Throat: Dry mucous membrane.     Eyes: EOM are normal. Pupils are equal, round, and reactive to light.     Ears: Bilateral pearly whiteTMs.     Neck: Normal range of motion. Neck supple.     Cardiovascular: Normal rate, regular " rhythm and normal heart sounds.      Pulmonary/Chest: Normal effort  and breath sounds normal.     Abdominal: Mid to lower abdominal surgical scar, diffuse lower abdominal tenderness to palpation.     Musculoskeletal: Normal range of motion.     Neurological: No neurological deficits.    Lymphatics: No edema.    : N/A    Skin: Skin is warm and dry.     Psychiatric: Normal mood and affect. Behavior is normal.       LAB:     All pertinent labs reviewed and interpreted.  Labs Ordered and Resulted from Time of ED Arrival to Time of ED Departure   BASIC METABOLIC PANEL - Abnormal       Result Value    Sodium 137      Potassium 3.5      Chloride 96 (*)     Carbon Dioxide (CO2) 25      Anion Gap 16 (*)     Urea Nitrogen 20.1      Creatinine 1.02      Calcium 9.2      Glucose 144 (*)     GFR Estimate 76     MAGNESIUM - Abnormal    Magnesium 1.6 (*)    CBC WITH PLATELETS AND DIFFERENTIAL - Abnormal    WBC Count 20.8 (*)     RBC Count 5.61      Hemoglobin 16.8      Hematocrit 48.9      MCV 87      MCH 29.9      MCHC 34.4      RDW 14.1      Platelet Count 296      % Neutrophils 86      % Lymphocytes 8      % Monocytes 5      % Eosinophils 0      % Basophils 0      % Immature Granulocytes 1      NRBCs per 100 WBC 0      Absolute Neutrophils 17.9 (*)     Absolute Lymphocytes 1.7      Absolute Monocytes 1.1      Absolute Eosinophils 0.0      Absolute Basophils 0.1      Absolute Immature Granulocytes 0.1      Absolute NRBCs 0.0     HEPATIC FUNCTION PANEL - Normal    Protein Total 7.7      Albumin 3.9      Bilirubin Total 0.9      Alkaline Phosphatase 59      AST 22      ALT 24      Bilirubin Direct 0.26     LIPASE - Normal    Lipase 27     TROPONIN T, HIGH SENSITIVITY - Normal    Troponin T, High Sensitivity 12     INFLUENZA A/B & SARS-COV2 PCR MULTIPLEX - Normal    Influenza A PCR Negative      Influenza B PCR Negative      RSV PCR Negative      SARS CoV2 PCR Negative          RADIOLOGY:     Reviewed all pertinent imaging.  Please see official radiology report.  CTA Chest Abdomen Pelvis w Contrast   Final Result   IMPRESSION:   1.  Cholelithiasis and mild pericholecystic fat stranding suspicious for acute cholecystitis.      2.  Bilateral adrenal nodules measure 1.1 cm on the right and 1.3 cm on the left. Recommend follow-up per guidelines below.      3.  Complex septated left renal cyst. Recommend nonemergent follow-up renal mass protocol MRI to further characterize.      REFERENCE:   Management of Incidental Adrenal Masses: A White Paper of the ACR Incidental Findings Committee. J Am Soni Radiol 2017;14:2374-1590.      ? 1 cm and < 4 cm:       No prior imaging and no history of cancer:   1-2 cm: probably benign. Consider 12-month CT adrenal follow-up.   >2cm, <4 cm: Adrenal CT.      No prior imaging and history of cancer: Adrenal CT      Prior Imaging:   Stable for 1 year: Benign. No follow-up.   New or enlarging:   --Adrenal CT or resection if no history of cancer.   --PET/CT or biopsy if positive history of cancer.                                EKG:     EKG #1  Sinus rhythm normal anterior progression normal axis PVCs  Time:[id number]    Ventricular rate 67 bmp  Axis normal  TX interval 206 ms  QRS duration 100 ms  QT//426 ms    Compared to previous EKG on December 2017 heart rate 81 then.  PVCs not seen.  No significant changes  I have independently reviewed and interpreted the EKG(s) documented above.      PROCEDURES:     Procedures      I, Lonny Heath, am serving as a scribe to document services personally performed by Dr. Butt based on my observation and the provider's statements to me. I, Marya Butt MD attest that Lonny Heath is acting in a scribe capacity, has observed my performance of the services and has documented them in accordance with my direction.    Marya Butt M.D.  Emergency Medicine  [facility] EMERGENCY DEPARTMENT  [address]  [id number]  [phone]  Dept: [phone]     Marya Butt MD  01/22/23 0934

## 2023-01-22 NOTE — PHARMACY-ADMISSION MEDICATION HISTORY
Pharmacy Note - Admission Medication History    Pertinent Provider Information: none     ______________________________________________________________________    Prior To Admission (PTA) med list completed and updated in EMR.       PTA Med List   Medication Sig Last Dose     allopurinol (ZYLOPRIM) 300 MG tablet Take 1 tablet (300 mg) by mouth once daily 1/21/2023 at 8am     atorvastatin (LIPITOR) 10 MG tablet TAKE 1 TABLET (10 MG) BY MOUTH ONCE DAILY 1/21/2023 at 8am     blood glucose test (ONETOUCH ULTRA TEST) strips [BLOOD GLUCOSE TEST (ONETOUCH ULTRA TEST) STRIPS] TEST TWICE DAILY.      blood glucose test strips [BLOOD GLUCOSE TEST STRIPS] Use 1 each As Directed 2 (two) times a day. Dispense brand per patient's insurance at pharmacy discretion.      blood-glucose meter (ONETOUCH VERIO IQ METER) Misc [BLOOD-GLUCOSE METER (ONETOUCH VERIO IQ METER) MISC] Dispense brand per patients insurance at pharmacy discretion.      furosemide (LASIX) 20 MG tablet Take 1 tablet (20 mg) by mouth once a day 1/21/2023 at 8am     generic lancets (ONETOUCH ULTRASOFT) [GENERIC LANCETS (ONETOUCH ULTRASOFT)] TEST TWICE DAILY. Dispense brand per patients insurance at pharmacy discretion.      hydrochlorothiazide (HYDRODIURIL) 25 MG tablet TAKE 1 TABLET (25 MG) BY MOUTH ONCE DAILY 1/21/2023 at 8am     Lancets (ONETOUCH DELICA PLUS [id number]) MISC TEST TWICE DAILY      lisinopril (ZESTRIL) 10 MG tablet TAKE 1 TABLET (10 MG) BY MOUTH ONCE DAILY 1/21/2023 at 8am     metFORMIN (GLUCOPHAGE XR) 500 MG 24 hr tablet TAKE 3 TABLETS BY MOUTH ONCE DAILY WITH BREAKFAST 1/21/2023 at 8am     sertraline (ZOLOFT) 25 MG tablet Take 1 tablet (25 mg total) by mouth ONCE daily. 1/21/2023 at 8am     silver sulfADIAZINE (SILVADENE, SSD) 1 % cream [SILVER SULFADIAZINE (SILVADENE, SSD) 1 % CREAM] Apply 1 application topically 2 (two) times a day as needed. Unknown at PRN     timolol maleate (TIMOPTIC) 0.5 % ophthalmic solution Instill 1 drop to both eyes every  morning. 1/21/2023 at 8am       Information source(s): Patient and CareEveryliam/SureScripts  Method of interview communication: in-person    Summary of Changes to PTA Med List  New: none  Discontinued: none  Changed: none    Patient was asked about OTC/herbal products specifically.  PTA med list reflects this.    In the past week, patient estimated taking medication this percent of the time:  greater than 90%.    Medication Affordability:  Not including over the counter (OTC) medications, was there a time in the past 12 months when you did not take your medications as prescribed because of cost?: No    Allergies were reviewed, assessed, and updated with the patient.      Patient did not bring any medications to the hospital and can't retrieve from home. No multi-dose medications are available for use during hospital stay.     The information provided in this note is only as accurate as the sources available at the time of the update(s).    Thank you for the opportunity to participate in the care of this patient.    AMY CONDON [facility]  1/22/2023 9:56 AM

## 2023-01-22 NOTE — CONSULTS
Care Management Initial Consult    General Information  Assessment completed with: Patient,    Type of CM/SW Visit: Initial Assessment    Primary Care Provider verified and updated as needed: Yes   Readmission within the last 30 days: no previous admission in last 30 days      Reason for Consult: discharge planning  Advance Care Planning: Advance Care Planning Reviewed: verified with patient          Communication Assessment  Patient's communication style: spoken language (English or Bilingual)             Cognitive  Cognitive/Neuro/Behavioral: WDL                      Living Environment:   People in home:       Current living Arrangements:        Able to return to prior arrangements:         Family/Social Support:  Care provided by:    Provides care for:    Marital Status: Single  Sibling(s), Neighbor          Description of Support System: Supportive, Involved    Support Assessment: Adequate family and caregiver support, Patient communicates needs well met    Current Resources:   Patient receiving home care services: No     Community Resources: None  Equipment currently used at home: cane, straight  Supplies currently used at home: None    Employment/Financial:  Employment Status: retired        Financial Concerns:             Lifestyle & Psychosocial Needs:  Social Determinants of Health     Tobacco Use: Low Risk      Smoking Tobacco Use: Never     Smokeless Tobacco Use: Never     Passive Exposure: Not on file   Alcohol Use: Not on file   Financial Resource Strain: Not on file   Food Insecurity: Not on file   Transportation Needs: Not on file   Physical Activity: Not on file   Stress: Not on file   Social Connections: Not on file   Intimate Partner Violence: Not on file   Depression: Not at risk     PHQ-2 Score: 0   Housing Stability: Not on file       Functional Status:  Prior to admission patient needed assistance:   Dependent ADLs:: Independent (Occasionally walks with a cane d/t dizziness. States that his  cane recently broke and he will be picking up a new one when he gets home.)  Dependent IADLs:: Independent       Mental Health Status:          Chemical Dependency Status:                Values/Beliefs:  Spiritual, Cultural Beliefs, [employer] Practices, Values that affect care:                 Additional Information:  Met with pt, introduced self and care management role.     Pt lives alone in his [facility]. He is independent with all ADLs, IADLs. Drives. He reports that he has a brother and neighbor that help when needed. He has already contacted his brother for a ride home when he is discharged and spoke with his neighbor to let him know that he may need help when he gets home. Surgery is scheduled for Virgie faye.      TIMBO bernstein.    Ricarda Bryant RN

## 2023-01-22 NOTE — ED NOTES
Heavy Ax1 to commode. Pt unsteady on feet, shaky. Further commode use not recommended at this time.

## 2023-01-22 NOTE — ED TRIAGE NOTES
Pt initially started to have back pain that progressed to n/v. No diarrhea. Pain is in bilateral flank areas. Started about 1800 last evening. Was unable to relieve the back pain.      Triage Assessment     Row Name 01/22/23 0614       Triage Assessment (Adult)    Airway WDL WDL       Respiratory WDL    Respiratory WDL WDL       Skin Circulation/Temperature WDL    Skin Circulation/Temperature WDL WDL       Cardiac WDL    Cardiac WDL WDL       Peripheral/Neurovascular WDL    Peripheral Neurovascular WDL WDL       Cognitive/Neuro/Behavioral WDL    Cognitive/Neuro/Behavioral WDL WDL

## 2023-01-22 NOTE — ANESTHESIA PROCEDURE NOTES
Airway       Patient location during procedure: OR       Procedure Start/Stop Times: 1/22/2023 5:17 PM  Staff -        CRNA: Omid Dhillon APRN CRNA       Performed By: CRNA  Consent for Airway        Urgency: elective  Indications and Patient Condition       Indications for airway management: marcos-procedural       Induction type:intravenous       Mask difficulty assessment: 1 - vent by mask    Final Airway Details       Final airway type: endotracheal airway       Successful airway: ETT - single  Endotracheal Airway Details        ETT size (mm): 8.0       Cuffed: yes       Cuff volume (mL): 6       Successful intubation technique: video laryngoscopy       VL Blade Size: Glidescope 3       Grade View of Cords: 1       Adjucts: stylet       Position: Right       Measured from: lips       Secured at (cm): 22       Bite block used: None    Post intubation assessment        Placement verified by: capnometry, equal breath sounds and chest rise        Number of attempts at approach: 1       Number of other approaches attempted: 0       Secured with: silk tape       Ease of procedure: easy       Dentition: Intact and Unchanged       Dental guard used and removed. Dental Guard Type: Proguard Red.    Medication(s) Administered   Medication Administration Time: 1/22/2023 5:17 PM

## 2023-01-22 NOTE — ED NOTES
"[facility] ED Handoff Report    ED Chief Complaint: N/V, abdominal pain    ED Diagnosis:  (K81.0) Acute cholecystitis  Comment:   Plan: Case Request: CHOLECYSTECTOMY, LAPAROSCOPIC            (E27.8) Adrenal nodule (H)  Comment: Needs follow-up  Plan:     (N28.1) Renal cyst, left  Comment: Needs follow-up  Plan:     (Z86.39) History of diabetes mellitus  Comment:   Plan:     (E66.01) Morbid obesity (H)  Comment:   Plan:     (E83.42) Hypomagnesemia  Comment:   Plan:        PMH:    Past Medical History:   Diagnosis Date    DM2 (diabetes mellitus, type 2) (H)     Essential hypertension     Gout         Code Status:  Full Code     Falls Risk: Yes Band: Applied    Current Living Situation/Residence: lives alone     Elimination Status: Continent: No     Activity Level: 2 assist    Patients Preferred Language:  English     Needed: No    Vital Signs:  /61   Pulse 79   Temp 97.1  F (36.2  C) (Temporal)   Resp 24   Ht 1.753 m (5' 9\")   Wt 142.9 kg (315 lb)   SpO2 91%   BMI 46.52 kg/m       Cardiac Rhythm: SR    Pain Score: 0/10    Is the Patient Confused:  No    Last Food or Drink: 1/21/23 at     Focused Assessment:  Abd pain managed with toradol and morphine. Aox3, Ax1-2 bed mobility, cont-incont. Unsteady on feet, do not recommend commode. In good spirits but anxious about procedure.    Tests Performed: Done: Labs and Imaging    Treatments Provided:  IV abx, IV fluids, toradol, morphine.    Family Dynamics/Concerns: No    Family Updated On Visitor Policy: Yes    Plan of Care Communicated to Family: Yes    Who Was Updated about Plan of Care: pt in contact with brother by phone    Belongings Checklist Done and Signed by Patient: No    Medications sent with patient: PO meds from this AM, eyedrops, insulin pen    Covid: symptomatic, negative    Additional Information:     RN: Wolfgang Dale RN   1/22/2023 3:27 PM       "

## 2023-01-22 NOTE — CONSULTS
General Surgery Consult    Chaparro Guillen MRN# [id number]     Date of Admission: 1/22/2023    Reason for Consult  Acute cholecystitis    History of Present Illness  Patient is a 77-year-old man with a history of diabetes gout and hypertension who presents with back and abdominal pain.  This began last evening.  He has had a difficult time getting comfortable.  He came to the emergency department where his work-up revealed an inflamed gallbladder on the CT scan.  Patient denies significant nausea.  He is not sure if he is ever had this kind of pain before.  He does endorse a poor appetite as of late.  He lives alone in a condo.    Past Medical History:  Past Medical History:   Diagnosis Date     DM2 (diabetes mellitus, type 2) (H)      Essential hypertension      Gout        Past Surgical History:  Past Surgical History:   Procedure Laterality Date     [place] GENITAL SURG PROC,MALE UNLISTED N/A 12/27/2017    Procedure: DAVINCI SIMPLE PROSTATECTOMY;  Surgeon: Jaya Cardenas MD;  Location: [facility];  Service: Urology       Allergies:   No Known Allergies  Medications:  No current facility-administered medications on file prior to encounter.  allopurinol (ZYLOPRIM) 300 MG tablet, Take 1 tablet (300 mg) by mouth once daily  atorvastatin (LIPITOR) 10 MG tablet, TAKE 1 TABLET (10 MG) BY MOUTH ONCE DAILY  blood glucose test (ONETOUCH ULTRA TEST) strips, [BLOOD GLUCOSE TEST (ONETOUCH ULTRA TEST) STRIPS] TEST TWICE DAILY.  blood glucose test strips, [BLOOD GLUCOSE TEST STRIPS] Use 1 each As Directed 2 (two) times a day. Dispense brand per patient's insurance at pharmacy discretion.  blood-glucose meter (ONETOUCH VERIO IQ METER) Misc, [BLOOD-GLUCOSE METER (ONETOUCH VERIO IQ METER) MISC] Dispense brand per patients insurance at pharmacy discretion.  furosemide (LASIX) 20 MG tablet, Take 1 tablet (20 mg) by mouth once a day  generic lancets (ONETOUCH ULTRASOFT), [GENERIC LANCETS (ONETOUCH ULTRASOFT)] TEST TWICE  "DAILY. Dispense brand per patients insurance at pharmacy discretion.  hydrochlorothiazide (HYDRODIURIL) 25 MG tablet, TAKE 1 TABLET (25 MG) BY MOUTH ONCE DAILY  Lancets (ONETOUCH DELICA PLUS [id number]) MISC, TEST TWICE DAILY  lisinopril (ZESTRIL) 10 MG tablet, TAKE 1 TABLET (10 MG) BY MOUTH ONCE DAILY  metFORMIN (GLUCOPHAGE XR) 500 MG 24 hr tablet, TAKE 3 TABLETS BY MOUTH ONCE DAILY WITH BREAKFAST  sertraline (ZOLOFT) 25 MG tablet, Take 1 tablet (25 mg total) by mouth ONCE daily.  silver sulfADIAZINE (SILVADENE, SSD) 1 % cream, [SILVER SULFADIAZINE (SILVADENE, SSD) 1 % CREAM] Apply 1 application topically 2 (two) times a day as needed.  timolol maleate (TIMOPTIC) 0.5 % ophthalmic solution, Instill 1 drop to both eyes every morning.  ONETOUCH VERIO IQ test strip, TEST TWO TIMES A DAY AS DIRECTED      Social History:  Social History     Socioeconomic History     Marital status: Single     Spouse name: Not on file     Number of children: Not on file     Years of education: Not on file     Highest education level: Not on file   Occupational History     Not on file   Tobacco Use     Smoking status: Never     Smokeless tobacco: Never   Substance and Sexual Activity     Alcohol use: No     Comment: Alcoholic Drinks/day: rarely     Drug use: No     Sexual activity: Not on file   Other Topics Concern     Not on file   Social History Narrative     Not on file     Social Determinants of Health     Financial Resource Strain: Not on file   Food Insecurity: Not on file   Transportation Needs: Not on file   Physical Activity: Not on file   Stress: Not on file   Social Connections: Not on file   Intimate Partner Violence: Not on file   Housing Stability: Not on file     Family History:  History reviewed. No pertinent family history.    ROS:  12 point review negative except as stated in H&P    Exam:  /61   Pulse 79   Temp 97.1  F (36.2  C) (Temporal)   Resp 29   Ht 1.753 m (5' 9\")   Wt 142.9 kg (315 lb)   SpO2 93%   BMI " 46.52 kg/m    General: Alert, interactive, NAD  ENT: Sclera anicteric  Resp: Non-labored breathing on room air  Cardiac: RRR  Abdomen: Abdomen is obese.  Soft and nondistended.  He is focally tender in the right upper quadrant.  Has a well-healed lower midline incision.    Labs:   Personally reviewed  WBC 20.8  LFTs normal    Imaging:   Personally reviewed   CT scan:  IMPRESSION:  1.  Cholelithiasis and mild pericholecystic fat stranding suspicious for acute cholecystitis.     2.  Bilateral adrenal nodules measure 1.1 cm on the right and 1.3 cm on the left. Recommend follow-up per guidelines below.     3.  Complex septated left renal cyst. Recommend nonemergent follow-up renal mass protocol MRI to further characterize.    Assessment: 77 year old male with a history of diabetes and hypertension and gout and obesity who presents with acute cholecystitis.  His white count severely elevated.  I think it is likely that he has fairly severe disease.  We discussed the pathophysiology of gallbladder disease.  We discussed surgical management of this disease.  The patient would very much like to have surgery.  We discussed the risk of surgery including conversion to open, bleeding, infection, and injury to bile duct.  Patient would like to proceed    Plan:   Admit inpatient  Laparoscopic cholecystectomy later today  Zosyn in the meantime  N.p.o.  I think the patient has a high chance of having a drain after surgery and with his living situation I do think he would be best served staying in the hospital overnight tonight.    Trey Hill MD  General Surgeon  [facility]  Surgery [address]  [place] [address]?  Office: [phone]

## 2023-01-22 NOTE — ANESTHESIA PREPROCEDURE EVALUATION
Anesthesia Pre-Procedure Evaluation    Patient: Chaparro Guillen   MRN: [id number] : 1945        Procedure : Procedure(s):  CHOLECYSTECTOMY, LAPAROSCOPIC          Past Medical History:   Diagnosis Date     DM2 (diabetes mellitus, type 2) (H)      Essential hypertension      Gout       Past Surgical History:   Procedure Laterality Date     [place] GENITAL SURG PROC,MALE UNLISTED N/A 2017    Procedure: DAVINCI SIMPLE PROSTATECTOMY;  Surgeon: Jaya Cardenas MD;  Location: [facility];  Service: Urology      No Known Allergies   Social History     Tobacco Use     Smoking status: Never     Smokeless tobacco: Never   Substance Use Topics     Alcohol use: No     Comment: Alcoholic Drinks/day: rarely      Wt Readings from Last 1 Encounters:   23 142.9 kg (315 lb)        Anesthesia Evaluation   Pt has had prior anesthetic.         ROS/MED HX  ENT/Pulmonary:  - neg pulmonary ROS     Neurologic:  - neg neurologic ROS     Cardiovascular: Comment: EKG 12/15/17:  Normal sinus rhythm   Normal ECG   No previous ECGs available   Confirmed by DOUGLAS AVERY MD LOC:WW ([id number]) on 12/15/2017 2:44:34 PM     (+) hypertension----- (-) murmur and wheezes   METS/Exercise Tolerance: >4 METS    Hematologic:  - neg hematologic  ROS     Musculoskeletal:  - neg musculoskeletal ROS     GI/Hepatic:     (+) cholecystitis/cholelithiasis,     Renal/Genitourinary:  - neg Renal ROS     Endo:     (+) type II DM, Obesity,     Psychiatric/Substance Use:  - neg psychiatric ROS     Infectious Disease:  - neg infectious disease ROS     Malignancy:  - neg malignancy ROS     Other:  - neg other ROS          Physical Exam    Airway  airway exam normal      Mallampati: IV   TM distance: > 3 FB   Neck ROM: limited   Mouth opening: > 3 cm    Respiratory Devices and Support         Dental           Cardiovascular          Rhythm and rate: regular and normal (-) no murmur    Pulmonary           breath sounds clear to auscultation   (-) no  wheezes        OUTSIDE LABS:  CBC:   Lab Results   Component Value Date    WBC 20.8 (H) 01/22/2023    WBC 10.4 03/02/2022    HGB 16.8 01/22/2023    HGB 15.7 03/02/2022    HCT 48.9 01/22/2023    HCT 46.8 03/02/2022     01/22/2023     03/02/2022     BMP:   Lab Results   Component Value Date     01/22/2023     03/02/2022    POTASSIUM 3.5 01/22/2023    POTASSIUM 3.9 03/02/2022    CHLORIDE 96 (L) 01/22/2023    CHLORIDE 99 03/02/2022    CO2 25 01/22/2023    CO2 26 03/02/2022    BUN 20.1 01/22/2023    BUN 17 03/02/2022    CR 1.02 01/22/2023    CR 1.02 01/22/2023     (H) 01/22/2023     (H) 01/22/2023     COAGS: No results found for: PTT, INR, FIBR  POC: No results found for: BGM, HCG, HCGS  HEPATIC:   Lab Results   Component Value Date    ALBUMIN 3.9 01/22/2023    PROTTOTAL 7.7 01/22/2023    ALT 24 01/22/2023    AST 22 01/22/2023    ALKPHOS 59 01/22/2023    BILITOTAL 0.9 01/22/2023     OTHER:   Lab Results   Component Value Date    A1C 5.8 (H) 09/02/2022    CHASE 9.2 01/22/2023    MAG 1.6 (L) 01/22/2023    LIPASE 27 01/22/2023    TSH 3.42 08/27/2021       Anesthesia Plan    ASA Status:  3, emergent    NPO Status:  NPO Appropriate    Anesthesia Type: General.     - Airway: ETT   Induction: Intravenous, Propofol.   Maintenance: Balanced.   Techniques and Equipment:     - Airway: Video-Laryngoscope         Consents    Anesthesia Plan(s) and associated risks, benefits, and realistic alternatives discussed. Questions answered and patient/representative(s) expressed understanding.     - Discussed: Risks, Benefits and Alternatives for BOTH SEDATION and the PROCEDURE were discussed     - Discussed with:  Patient      - Patient is DNR/DNI Status: No    Use of blood products discussed: No .     Postoperative Care    Pain management: IV analgesics, Oral pain medications.   PONV prophylaxis: Ondansetron (or other 5HT-3), Dexamethasone or Solumedrol     Comments:    Other Comments: GETA  Magnesium  infusion  Zofran for PONV            Yann Schwarz MD

## 2023-01-23 ENCOUNTER — APPOINTMENT (OUTPATIENT)
Dept: PHYSICAL THERAPY | Facility: HOSPITAL | Age: 78
End: 2023-01-23
Attending: INTERNAL MEDICINE
Payer: COMMERCIAL

## 2023-01-23 LAB
ALBUMIN SERPL BCG-MCNC: 3.1 G/DL (ref 3.5–5.2)
ALP SERPL-CCNC: 61 U/L (ref 40–129)
ALT SERPL W P-5'-P-CCNC: 192 U/L (ref 10–50)
ANION GAP SERPL CALCULATED.3IONS-SCNC: 9 MMOL/L (ref 7–15)
AST SERPL W P-5'-P-CCNC: 130 U/L (ref 10–50)
BILIRUB SERPL-MCNC: 1.5 MG/DL
BUN SERPL-MCNC: 18.4 MG/DL (ref 8–23)
CALCIUM SERPL-MCNC: 8.4 MG/DL (ref 8.8–10.2)
CHLORIDE SERPL-SCNC: 101 MMOL/L (ref 98–107)
CREAT SERPL-MCNC: 0.96 MG/DL (ref 0.67–1.17)
DEPRECATED HCO3 PLAS-SCNC: 26 MMOL/L (ref 22–29)
ERYTHROCYTE [DISTWIDTH] IN BLOOD BY AUTOMATED COUNT: 14.1 % (ref 10–15)
GFR SERPL CREATININE-BSD FRML MDRD: 82 ML/MIN/1.73M2
GLUCOSE BLDC GLUCOMTR-MCNC: 125 MG/DL (ref 70–99)
GLUCOSE BLDC GLUCOMTR-MCNC: 138 MG/DL (ref 70–99)
GLUCOSE BLDC GLUCOMTR-MCNC: 139 MG/DL (ref 70–99)
GLUCOSE BLDC GLUCOMTR-MCNC: 145 MG/DL (ref 70–99)
GLUCOSE BLDC GLUCOMTR-MCNC: 78 MG/DL (ref 70–99)
GLUCOSE SERPL-MCNC: 148 MG/DL (ref 70–99)
HCT VFR BLD AUTO: 43.1 % (ref 40–53)
HGB BLD-MCNC: 14.6 G/DL (ref 13.3–17.7)
MAGNESIUM SERPL-MCNC: 2.3 MG/DL (ref 1.7–2.3)
MCH RBC QN AUTO: 29.6 PG (ref 26.5–33)
MCHC RBC AUTO-ENTMCNC: 33.9 G/DL (ref 31.5–36.5)
MCV RBC AUTO: 87 FL (ref 78–100)
PLATELET # BLD AUTO: 218 10E3/UL (ref 150–450)
POTASSIUM SERPL-SCNC: 3.5 MMOL/L (ref 3.4–5.3)
PROT SERPL-MCNC: 6.4 G/DL (ref 6.4–8.3)
RBC # BLD AUTO: 4.93 10E6/UL (ref 4.4–5.9)
SODIUM SERPL-SCNC: 136 MMOL/L (ref 136–145)
WBC # BLD AUTO: 16.8 10E3/UL (ref 4–11)

## 2023-01-23 PROCEDURE — 80053 COMPREHEN METABOLIC PANEL: CPT | Performed by: SURGERY

## 2023-01-23 PROCEDURE — 97116 GAIT TRAINING THERAPY: CPT | Mod: GP | Performed by: PHYSICAL THERAPIST

## 2023-01-23 PROCEDURE — 250N000013 HC RX MED GY IP 250 OP 250 PS 637: Performed by: INTERNAL MEDICINE

## 2023-01-23 PROCEDURE — G0378 HOSPITAL OBSERVATION PER HR: HCPCS

## 2023-01-23 PROCEDURE — 96376 TX/PRO/DX INJ SAME DRUG ADON: CPT

## 2023-01-23 PROCEDURE — 99024 POSTOP FOLLOW-UP VISIT: CPT | Performed by: SURGERY

## 2023-01-23 PROCEDURE — 82962 GLUCOSE BLOOD TEST: CPT

## 2023-01-23 PROCEDURE — 83735 ASSAY OF MAGNESIUM: CPT | Performed by: INTERNAL MEDICINE

## 2023-01-23 PROCEDURE — 97530 THERAPEUTIC ACTIVITIES: CPT | Mod: GP | Performed by: PHYSICAL THERAPIST

## 2023-01-23 PROCEDURE — G0378 HOSPITAL OBSERVATION PER HR: HCPCS | Mod: CS

## 2023-01-23 PROCEDURE — 250N000011 HC RX IP 250 OP 636: Performed by: SURGERY

## 2023-01-23 PROCEDURE — 99232 SBSQ HOSP IP/OBS MODERATE 35: CPT | Performed by: INTERNAL MEDICINE

## 2023-01-23 PROCEDURE — 96372 THER/PROPH/DIAG INJ SC/IM: CPT | Performed by: SURGERY

## 2023-01-23 PROCEDURE — 97162 PT EVAL MOD COMPLEX 30 MIN: CPT | Mod: GP | Performed by: PHYSICAL THERAPIST

## 2023-01-23 PROCEDURE — 250N000013 HC RX MED GY IP 250 OP 250 PS 637: Performed by: HOSPITALIST

## 2023-01-23 PROCEDURE — 85027 COMPLETE CBC AUTOMATED: CPT | Performed by: SURGERY

## 2023-01-23 PROCEDURE — 250N000013 HC RX MED GY IP 250 OP 250 PS 637: Performed by: SURGERY

## 2023-01-23 PROCEDURE — 36415 COLL VENOUS BLD VENIPUNCTURE: CPT | Performed by: SURGERY

## 2023-01-23 PROCEDURE — 36415 COLL VENOUS BLD VENIPUNCTURE: CPT | Performed by: INTERNAL MEDICINE

## 2023-01-23 RX ORDER — DEXTROSE MONOHYDRATE 25 G/50ML
25-50 INJECTION, SOLUTION INTRAVENOUS
Status: DISCONTINUED | OUTPATIENT
Start: 2023-01-23 | End: 2023-01-24 | Stop reason: HOSPADM

## 2023-01-23 RX ORDER — NICOTINE POLACRILEX 4 MG
15-30 LOZENGE BUCCAL
Status: DISCONTINUED | OUTPATIENT
Start: 2023-01-23 | End: 2023-01-24 | Stop reason: HOSPADM

## 2023-01-23 RX ADMIN — HYDROCHLOROTHIAZIDE 25 MG: 25 TABLET ORAL at 09:30

## 2023-01-23 RX ADMIN — ATORVASTATIN CALCIUM 10 MG: 10 TABLET, FILM COATED ORAL at 09:30

## 2023-01-23 RX ADMIN — BENZOCAINE 6 MG-MENTHOL 10 MG LOZENGES 1 LOZENGE: at 21:46

## 2023-01-23 RX ADMIN — BENZOCAINE 6 MG-MENTHOL 10 MG LOZENGES 1 LOZENGE: at 12:57

## 2023-01-23 RX ADMIN — PIPERACILLIN AND TAZOBACTAM 3.38 G: 3; .375 INJECTION, POWDER, LYOPHILIZED, FOR SOLUTION INTRAVENOUS at 23:42

## 2023-01-23 RX ADMIN — OXYCODONE HYDROCHLORIDE 5 MG: 5 TABLET ORAL at 20:35

## 2023-01-23 RX ADMIN — PIPERACILLIN AND TAZOBACTAM 3.38 G: 3; .375 INJECTION, POWDER, LYOPHILIZED, FOR SOLUTION INTRAVENOUS at 00:58

## 2023-01-23 RX ADMIN — ACETAMINOPHEN 975 MG: 325 TABLET, FILM COATED ORAL at 20:35

## 2023-01-23 RX ADMIN — TIMOLOL MALEATE 1 DROP: 5 SOLUTION OPHTHALMIC at 09:32

## 2023-01-23 RX ADMIN — SERTRALINE HYDROCHLORIDE 25 MG: 25 TABLET ORAL at 09:41

## 2023-01-23 RX ADMIN — ALLOPURINOL 300 MG: 300 TABLET ORAL at 09:30

## 2023-01-23 RX ADMIN — ACETAMINOPHEN 975 MG: 325 TABLET, FILM COATED ORAL at 11:52

## 2023-01-23 RX ADMIN — POLYETHYLENE GLYCOL 3350 17 G: 17 POWDER, FOR SOLUTION ORAL at 09:32

## 2023-01-23 RX ADMIN — PIPERACILLIN AND TAZOBACTAM 3.38 G: 3; .375 INJECTION, POWDER, LYOPHILIZED, FOR SOLUTION INTRAVENOUS at 09:31

## 2023-01-23 RX ADMIN — SENNOSIDES AND DOCUSATE SODIUM 1 TABLET: 50; 8.6 TABLET ORAL at 20:35

## 2023-01-23 RX ADMIN — ACETAMINOPHEN 975 MG: 325 TABLET, FILM COATED ORAL at 04:54

## 2023-01-23 RX ADMIN — PIPERACILLIN AND TAZOBACTAM 3.38 G: 3; .375 INJECTION, POWDER, LYOPHILIZED, FOR SOLUTION INTRAVENOUS at 16:30

## 2023-01-23 RX ADMIN — LISINOPRIL 10 MG: 5 TABLET ORAL at 09:31

## 2023-01-23 RX ADMIN — SENNOSIDES AND DOCUSATE SODIUM 1 TABLET: 50; 8.6 TABLET ORAL at 09:30

## 2023-01-23 RX ADMIN — ENOXAPARIN SODIUM 40 MG: 40 INJECTION SUBCUTANEOUS at 15:09

## 2023-01-23 ASSESSMENT — ACTIVITIES OF DAILY LIVING (ADL)
ADLS_ACUITY_SCORE: 40
ADLS_ACUITY_SCORE: 37
ADLS_ACUITY_SCORE: 37
ADLS_ACUITY_SCORE: 40
ADLS_ACUITY_SCORE: 37
ADLS_ACUITY_SCORE: 37
ADLS_ACUITY_SCORE: 40
ADLS_ACUITY_SCORE: 37
ADLS_ACUITY_SCORE: 37
ADLS_ACUITY_SCORE: 40

## 2023-01-23 NOTE — PLAN OF CARE
PRIMARY DIAGNOSIS: ACUTE PAIN  OUTPATIENT/OBSERVATION GOALS TO BE MET BEFORE DISCHARGE:  1. Pain Status: Improved-controlled with oral pain medications.    2. Return to near baseline physical activity: Yes    3. Cleared for discharge by consultants (if involved): No    Discharge Planner Nurse   Safe discharge environment identified: Yes  Barriers to discharge: Yes       Entered by: Stella Sepulveda RN 01/23/2023 3:08 AM   Stella Sepulveda RN   Please review provider order for any additional goals.   Nurse to notify provider when observation goals have been met and patient is ready for discharge.Goal Outcome Evaluation:

## 2023-01-23 NOTE — ANESTHESIA POSTPROCEDURE EVALUATION
Patient: Chaparro Guillen    Procedure: Procedure(s):  CHOLECYSTECTOMY, LAPAROSCOPIC       Anesthesia Type:  General    Note:  Disposition: Inpatient   Postop Pain Control: Uneventful            Sign Out: Well controlled pain   PONV: No   Neuro/Psych: Uneventful            Sign Out: Acceptable/Baseline neuro status   Airway/Respiratory: Uneventful            Sign Out: Acceptable/Baseline resp. status   CV/Hemodynamics: Uneventful            Sign Out: Acceptable CV status; No obvious hypovolemia; No obvious fluid overload   Other NRE:    DID A NON-ROUTINE EVENT OCCUR?            Last vitals:  Vitals Value Taken Time   /60 01/22/23 1900   Temp 36.3  C (97.4  F) 01/22/23 1845   Pulse 67 01/22/23 1912   Resp 12 01/22/23 1912   SpO2 93 % 01/22/23 1912   Vitals shown include unvalidated device data.    Electronically Signed By: Savannah Gaspar MD  January 22, 2023  7:14 PM

## 2023-01-23 NOTE — ANESTHESIA CARE TRANSFER NOTE
Patient: Chaparro Guillen    Procedure: Procedure(s):  CHOLECYSTECTOMY, LAPAROSCOPIC       Diagnosis: Acute cholecystitis [K81.0]  Diagnosis Additional Information: No value filed.    Anesthesia Type:   General     Note:    Oropharynx: oropharynx clear of all foreign objects  Level of Consciousness: drowsy  Oxygen Supplementation: face mask  Level of Supplemental Oxygen (L/min / FiO2): 8  Independent Airway: airway patency satisfactory and stable  Dentition: dentition unchanged  Vital Signs Stable: post-procedure vital signs reviewed and stable  Report to RN Given: handoff report given  Patient transferred to: PACU    Handoff Report: Identifed the Patient, Identified the Reponsible Provider, Reviewed the pertinent medical history, Discussed the surgical course, Reviewed Intra-OP anesthesia mangement and issues during anesthesia, Set expectations for post-procedure period and Allowed opportunity for questions and acknowledgement of understanding      Vitals:  Vitals Value Taken Time   /60 01/22/23 1900   Temp 36.3  C (97.4  F) 01/22/23 1845   Pulse 70 01/22/23 1904   Resp 13 01/22/23 1904   SpO2 96 % 01/22/23 1904   Vitals shown include unvalidated device data.    Electronically Signed By: SOHAIL Moreau CRNA  January 22, 2023  7:05 PM

## 2023-01-23 NOTE — PROGRESS NOTES
"General surgery progress note    No acute events overnight.  Patient states that he is feeling better this morning.  He has some soreness in his upper abdomen as expected after surgery.  He has not really had anything to eat but has had some liquids.  He has been on oxygen overnight.  He has not yet been out of bed.    /76 (BP Location: Left arm)   Pulse 57   Temp 98  F (36.7  C) (Oral)   Resp 18   Ht 1.753 m (5' 9\")   Wt 142.9 kg (315 lb)   SpO2 99%   BMI 46.52 kg/m       Laying in bed in no acute distress  Nonlabored breathing initially on oxygen which I removed and his saturations remained in the mid 90s.  Regular rate and rhythm  Abdomen is nondistended and appropriately tender around incisions.  Drain has dark thin reddish fluid consistent with serosanguineous drainage mixed with hemostatic agent.     I/O last 3 completed shifts:  In: 2000 [I.V.:2000]  Out: 140 [Urine:100; Drains:40]    T bili 1.5 today  Mild transaminitis  WBC 16.8 from 20    Patient is a 77-year-old man is postoperative day 1 after laparoscopic cholecystectomy for gangrenous cholecystitis.  He is recovering well.  He has a mild bump in his LFTs which I think is simply from the amount of inflammation in the dissection.  He has not as yet really been road tested for discharge as he has not eaten or been out of bed and was still on oxygen.    -Keep the drain for now  -Carb consistent diet  -Get the patient out of bed walking around and up to a chair  -Stop supplemental oxygen  -We will keep antibiotics for now  -Lovenox  -Discharge as early as this afternoon more likely tomorrow.    Trey Hill MD  General Surgeon  [facility]  Surgery [facility] - [address]  [address]  [place] [address]?  Office: [phone]    "

## 2023-01-23 NOTE — PROGRESS NOTES
"   01/23/23 1100   Appointment Info   Signing Clinician's Name / Credentials (PT) Roselia Calabrese, PT, DPT   Quick Adds   Quick Adds Certification   Living Environment   People in Home alone   Current Living Arrangements condominium   Home Accessibility no concerns   Self-Care   Equipment Currently Used at Home cane, straight  (reports cane is broken)   Fall history within last six months no   Activity/Exercise/Self-Care Comment Pt independent with all ADLs/IADLs at baseline.   General Information   Onset of Illness/Injury or Date of Surgery 01/22/23   Referring Physician Sheila Cagle MD   Patient/Family Therapy Goals Statement (PT) None stated.   Pertinent History of Current Problem (include personal factors and/or comorbidities that impact the POC) Per H&P: \"77 year old old male with significant past medical history of hypertension, hyperlipidemia, type 2 diabetes mellitus, gout and anxiety who is presented to the hospital complaining of abdominal pain and found that he has acute cholecystitis.\"   Existing Precautions/Restrictions fall;abdominal   Range of Motion (ROM)   Range of Motion ROM is WFL   Strength (Manual Muscle Testing)   Strength (Manual Muscle Testing) Deficits observed during functional mobility   Bed Mobility   Comment, (Bed Mobility) Verbally reviewed log roll sequencing and technique.   Transfers   Transfers sit-stand transfer   Transfer Safety Concerns Noted decreased weight-shifting ability   Impairments Contributing to Impaired Transfers pain   Sit-Stand Transfer   Sit-Stand [place] (Transfers) contact guard;verbal cues   Assistive Device (Sit-Stand Transfers) walker, front-wheeled   Gait/Stairs (Locomotion)   [place] Level (Gait) contact guard;verbal cues   Assistive Device (Gait) walker, front-wheeled   Distance in Feet 40'   Distance in Feet (Gait) 60' x 2 with FWW, 40' without AD   Pattern (Gait) step-to   Deviations/Abnormal Patterns (Gait) janice decreased;gait speed decreased "   Clinical Impression   Criteria for Skilled Therapeutic Intervention Yes, treatment indicated   PT Diagnosis (PT) impaired functional mobility   Influenced by the following impairments pain, decreased strength, impaired balance   Functional limitations due to impairments transfers, ambulation   Clinical Presentation (PT Evaluation Complexity) Evolving/Changing   Clinical Presentation Rationale Pt presents as medically diagnosed.   Clinical Decision Making (Complexity) moderate complexity   Planned Therapy Interventions (PT) balance training;bed mobility training;gait training;home exercise program;neuromuscular re-education;patient/family education;strengthening;transfer training   Risk & Benefits of therapy have been explained evaluation/treatment results reviewed;participants voiced agreement with care plan;participants included;patient   PT Total Evaluation Time   PT Eval, Moderate Complexity Minutes (58772) 15   Plan of Care Review   Plan of Care Reviewed With patient   Therapy Certification   Start of care date 01/23/23   Certification date from 01/23/23   Certification date to 01/30/23   Medical Diagnosis acute cholecystitis   Physical Therapy Goals   PT Frequency Daily   PT Predicted Duration/Target Date for Goal Attainment 01/30/23   PT Goals Bed Mobility;Transfers;Gait   PT: Bed Mobility Supervision/stand-by assist;Supine to/from sit;Within precautions   PT: Transfers Supervision/stand-by assist;Sit to/from stand;Assistive device   PT: Gait Supervision/stand-by assist;Assistive device;Rolling walker;Greater than 200 feet   Interventions   Interventions Quick Adds Gait Training   Gait Training   Gait Training Minutes (03033) 10   Symptoms Noted During/After Treatment (Gait Training) fatigue   Treatment Detail/Skilled Intervention Additional 60' with FWW. Educated pt on utilizing safe AD as cane at home is currently broken. Discussed utilizing FWW for a few days for safety. Patient agreeable to trial  ambulation without AD. Completes 40' without FWW, additional 60' at end of walk with FWW.   [place] Level (Gait Training) stand-by assist   Physical Assistance Level (Gait Training) verbal cues;supervision   Assistive Device (Gait Training) rolling walker   Gait Analysis Deviations decreased janice;decreased step length   Impairments (Gait Analysis/Training) balance impaired;pain;strength decreased   PT Discharge Planning   PT Plan assess bed mobility, gait, bring cane to trial vs no AD, vs FWW   PT Discharge Recommendation (DC Rec) home with assist   PT Rationale for DC Rec Patient reports brother and neighbor are available to assist patient as needed. Pt moving well with stand-by assist. At this time, recommend FWW at discharge.   PT Brief overview of current status Sit <> stand, CGA with FWW. Ambulates 120' with FWW, 40' without AD, CGA.   Total Session Time   Timed Code Treatment Minutes 10   Total Session Time (sum of timed and untimed services) 25     M [facility]  OUTPATIENT PHYSICAL THERAPY EVALUATION  PLAN OF TREATMENT FOR OUTPATIENT REHABILITATION  (COMPLETE FOR INITIAL CLAIMS ONLY)  Patient's Last Name, First Name, M.I.  YOB: 1945  Chaparro Guillen                        Provider's Name  [facility] Medical Record No.  [id number]                             Onset Date:  01/22/23   Start of Care Date:  01/23/23   Type:     _X_PT   ___OT   ___SLP Medical Diagnosis:  acute cholecystitis              PT Diagnosis:  impaired functional mobility Visits from SOC:  1     See note for plan of treatment, functional goals and certification details    I CERTIFY THE NEED FOR THESE SERVICES FURNISHED UNDER        THIS PLAN OF TREATMENT AND WHILE UNDER MY CARE     (Physician co-signature of this document indicates review and certification of the therapy plan).

## 2023-01-23 NOTE — OP NOTE
General Surgery Operative Note    Date of Surgery: 1/22/2023     Surgeon: Trey Hill MD    Assistant: None    Preoperative Diagnosis: Acute cholecystitis    Postoperative Diagnosis: Gangrenous cholecystitis    Procedure: Laparoscopic cholecystectomy    EBL: 50 cc    Findings: Necrotic gallbladder containing multiple large stones.    Indications:  Patient is a 77-year-old man with history of diabetes who presented with back and abdominal pain.  His work-up was consistent with acute cholecystitis.  After discussion of this disease and surgery including the risks, the patient consented to the operation.    Details of operation:  Patient was brought to the operating room and placed on the table in the supine position.  General anesthesia was induced without incident.  The patient was prepped and draped in the usual sterile fashion.  A timeout for safety was performed.    I began with a left upper quadrant Veress needle entry and the abdomen was insufflated without incident.  I then placed a 5 mm optical trocar above the umbilicus.  There was no injury with entry.  I then placed 12 mm subxiphoid port and 2 5 mm right subcostal ports.  The gallbladder was identified.  It was clearly necrotic.  Use a decompression needle to drain out some of the bile.  I then grasped and elevated cephalad.  The omental adhesions were taken down.  Using mostly blunt dissection I began to investigate the area of the infundibulum and the hepatocystic triangle.  There was a large stone down in this area that made retraction fairly difficult.  Ultimately, being very careful and using mostly blunt dissection with the suction  I was able to identify the cystic artery and the cystic duct.  Cystic artery was clipped and divided as there was some bleeding.  I then continued to clear up the gallbladder until I was third of the way up being assured that the cystic duct was in fact the cystic duct.  I then used large clips as it was  quite wide and divided it.  The gallbladder was then removed from the liver bed using some electrocautery but mostly it came away with very little effort as it was dead.  I placed the gallbladder in an Endo Catch bag.  I then used the suction  to clean up small amount of spilled bile and blood.  I then introduced a Ray-Nhi and held it on the liver bed and use electrocautery to assure hemostasis.  The liver bed remained a bit oozy so I opted to use some Surgicel powder.  I then remove the Ray-Nhi and the gallbladder from the abdomen.  Due to the gangrenous nature of the gallbladder and the ongoing oozing, I did decide to leave a drain through the most lateral port site.  This was placed in the gallbladder fossa.  The abdomen is then desufflated and the ports were all removed.  The drain was secured in place with an 0 silk suture.  The skin was then closed with 4-0 Monocryl sutures with Exofin as a dressing.  Patient was awakened from anesthesia and brought to the recovery room.    Trey Hill MD  General Surgeon  [facility]  Surgery [address] [address]?  Office: [phone]

## 2023-01-23 NOTE — PLAN OF CARE
PRIMARY DIAGNOSIS: ACUTE PAIN  OUTPATIENT/OBSERVATION GOALS TO BE MET BEFORE DISCHARGE:  1. Pain Status: Improved-controlled with oral pain medications.    2. Return to near baseline physical activity: Yes    3. Cleared for discharge by consultants (if involved): No    Discharge Planner Nurse   Safe discharge environment identified: Yes  Barriers to discharge: No       Entered by: Stella Sepulveda RN 01/23/2023 5:39 AM   Stella Sepulveda RN    Please review provider order for any additional goals.   Nurse to notify provider when observation goals have been met and patient is ready for discharge.Goal Outcome Evaluation:

## 2023-01-23 NOTE — PLAN OF CARE
Problem: Plan of Care - These are the overarching goals to be used throughout the patient stay.    Goal: Plan of Care Review  Description: The Plan of Care Review/Shift note should be completed every shift.  The Outcome Evaluation is a brief statement about your assessment that the patient is improving, declining, or no change.  This information will be displayed automatically on your shift note.  Outcome: Progressing   Goal Outcome Evaluation:  Pt tolerating regular diet and ambulated in hallway x2.  Passing flatus.  Pain only with movement at surgical site.  Surgery sites dry and intact with dermabond.

## 2023-01-23 NOTE — PROGRESS NOTES
[facility]    Medicine Progress Note - Hospitalist Service    Date of Admission:  1/22/2023    Assessment & Plan   77-year-old male with multiple comorbidities cholelithiasis with acute cholecystitis.  Started on empiric IV Zosyn.  Postoperative day 1 after laparoscopic cholecystectomy for gangrenous cholecystitis.    Leukocytosis downtrending from 28.8 down to 16.8.  Afebrile, and hemodynamically stable.      Cholelithiasis with acute cholecystitis    Abdominal pain, right upper quadrant, improved    Nausea and vomiting, resolved  Postoperative day 1 after laparoscopic cholecystectomy.    Postop findings consistent with gangrenous cholecystitis.  Given the extent of inflammation abutting the liver and elevated LFTs  Continue IV antibiotics and consider oral short course Abx on discharge.    Rest of the postop care per general surgery  Multimodal analgesia regiment, antiemetic and IVF        Essential hypertension-stable resume lisinopril and hydrochlorothiazide    Type 2 diabetes mellitus,hold PTA metformin,  sliding scale insulin    BPH without obstruction, resume home medications      Morbid obesity (H) -counseled on weight loss and exercise and diet    Mixed hyperlipidemia-resume atorvastatin history    Gout-continue allopurinol            Diet: Consistent Carbohydrate Diet High Consistent Carb (75 g CHO per Meal) Diet    DVT Prophylaxis: Enoxaparin (Lovenox) SQ  Rolon Catheter: Not present  Lines: None     Cardiac Monitoring: None  Code Status: Full Code      Clinically Significant Risk Factors Present on Admission            # Hypomagnesemia: Lowest Mg = 1.6 mg/dL in last 2 days, will replace as needed   # Hypoalbuminemia: Lowest albumin = 3.1 g/dL at 1/23/2023  7:20 AM, will monitor as appropriate     # Hypertension: home medication list includes antihypertensive(s)   # Acute Respiratory Failure: Documented O2 saturation < 91%.  Continue supplemental oxygen as needed     # Severe  "Obesity: Estimated body mass index is 46.52 kg/m  as calculated from the following:    Height as of this encounter: 1.753 m (5' 9\").    Weight as of this encounter: 142.9 kg (315 lb).           Disposition Plan      Expected Discharge Date: 01/24/2023      Destination: home  Discharge Comments: pend progression after surgery 1/22          Sheila Cagle MD  Hospitalist Service  [facility]  Securely message with [employer] (more info)  Text page via [employer] Paging/Directory   ______________________________________________________________________    Interval History   No acute events overnight.  Patient is doing well this morning.  He does have some abdominal pain near the incision sites otherwise no other complaints noted.  He has not had any oral intake since cholecystectomy.  And has not gotten out of the bed yet.  No bowel movements and not passing gas yet.  Started on clears this morning.  We will advance as tolerated.      Plan is to get him out of the bed with PT ans assess for safety ambulation and discharge tomorrow.    Physical Exam   Vital Signs: Temp: 98.2  F (36.8  C) Temp src: Oral BP: 116/87 Pulse: 70   Resp: 20 SpO2: 96 % O2 Device: None (Room air) Oxygen Delivery: 3 LPM  Weight: 315 lbs 0 oz      General: AO X 3, lying comfortably in bed in no apparent distress  HEENT: pupils equal and reactive to light and accommodation, extraocular movements are intact; oral mucosa moist  Neck: Supple no raised JVD, no rigidity  Respiratory: lungs b/l clear to auscultation, no wheezing or crepitations  CVS: nl s1 s2, regular rate and rhythm, no murmur  P/A: soft, nt,nd, no guarding rigidity or rebound tenderness  Ext: no edema  Neuro: no focal neurological deficits noted, cranial nerves 2-12 grossly intact  Psychiatry: normal mood and affect  Skin: No obvious skin rashes or ulcers      Medical Decision Making       35 MINUTES SPENT BY ME on the date of service doing chart review, history, exam, " documentation & further activities per the note.      Data     I have personally reviewed the following data over the past 24 hrs:    16.8 (H)  \   14.6   / 218     136 101 18.4 /  138 (H)   3.5 26 0.96 \       ALT: 192 (H) AST: 130 (H) AP: 61 TBILI: 1.5 (H)   ALB: 3.1 (L) TOT PROTEIN: 6.4 LIPASE: N/A       Imaging results reviewed over the past 24 hrs:   No results found for this or any previous visit (from the past 24 hour(s)).

## 2023-01-24 VITALS
HEIGHT: 69 IN | TEMPERATURE: 97.7 F | HEART RATE: 67 BPM | OXYGEN SATURATION: 97 % | DIASTOLIC BLOOD PRESSURE: 75 MMHG | BODY MASS INDEX: 46.65 KG/M2 | WEIGHT: 315 LBS | RESPIRATION RATE: 18 BRPM | SYSTOLIC BLOOD PRESSURE: 112 MMHG

## 2023-01-24 LAB
ALBUMIN SERPL BCG-MCNC: 3.5 G/DL (ref 3.5–5.2)
ALP SERPL-CCNC: 68 U/L (ref 40–129)
ALT SERPL W P-5'-P-CCNC: 162 U/L (ref 10–50)
ANION GAP SERPL CALCULATED.3IONS-SCNC: 9 MMOL/L (ref 7–15)
AST SERPL W P-5'-P-CCNC: 72 U/L (ref 10–50)
BILIRUB SERPL-MCNC: 1.4 MG/DL
BUN SERPL-MCNC: 16.8 MG/DL (ref 8–23)
CALCIUM SERPL-MCNC: 8.9 MG/DL (ref 8.8–10.2)
CHLORIDE SERPL-SCNC: 97 MMOL/L (ref 98–107)
CREAT SERPL-MCNC: 0.96 MG/DL (ref 0.67–1.17)
DEPRECATED HCO3 PLAS-SCNC: 30 MMOL/L (ref 22–29)
ERYTHROCYTE [DISTWIDTH] IN BLOOD BY AUTOMATED COUNT: 14.3 % (ref 10–15)
GFR SERPL CREATININE-BSD FRML MDRD: 82 ML/MIN/1.73M2
GLUCOSE BLDC GLUCOMTR-MCNC: 115 MG/DL (ref 70–99)
GLUCOSE BLDC GLUCOMTR-MCNC: 116 MG/DL (ref 70–99)
GLUCOSE SERPL-MCNC: 115 MG/DL (ref 70–99)
HCT VFR BLD AUTO: 43.6 % (ref 40–53)
HGB BLD-MCNC: 14.6 G/DL (ref 13.3–17.7)
MAGNESIUM SERPL-MCNC: 2 MG/DL (ref 1.7–2.3)
MCH RBC QN AUTO: 29.7 PG (ref 26.5–33)
MCHC RBC AUTO-ENTMCNC: 33.5 G/DL (ref 31.5–36.5)
MCV RBC AUTO: 89 FL (ref 78–100)
PLATELET # BLD AUTO: 207 10E3/UL (ref 150–450)
POTASSIUM SERPL-SCNC: 3.2 MMOL/L (ref 3.4–5.3)
PROT SERPL-MCNC: 7 G/DL (ref 6.4–8.3)
RBC # BLD AUTO: 4.91 10E6/UL (ref 4.4–5.9)
SODIUM SERPL-SCNC: 136 MMOL/L (ref 136–145)
WBC # BLD AUTO: 12.3 10E3/UL (ref 4–11)

## 2023-01-24 PROCEDURE — 250N000011 HC RX IP 250 OP 636: Performed by: SURGERY

## 2023-01-24 PROCEDURE — 82962 GLUCOSE BLOOD TEST: CPT

## 2023-01-24 PROCEDURE — G0378 HOSPITAL OBSERVATION PER HR: HCPCS

## 2023-01-24 PROCEDURE — 80053 COMPREHEN METABOLIC PANEL: CPT | Performed by: SURGERY

## 2023-01-24 PROCEDURE — 99024 POSTOP FOLLOW-UP VISIT: CPT | Performed by: PHYSICIAN ASSISTANT

## 2023-01-24 PROCEDURE — 96376 TX/PRO/DX INJ SAME DRUG ADON: CPT

## 2023-01-24 PROCEDURE — 36415 COLL VENOUS BLD VENIPUNCTURE: CPT | Performed by: SURGERY

## 2023-01-24 PROCEDURE — 83735 ASSAY OF MAGNESIUM: CPT | Performed by: INTERNAL MEDICINE

## 2023-01-24 PROCEDURE — 85027 COMPLETE CBC AUTOMATED: CPT | Performed by: SURGERY

## 2023-01-24 PROCEDURE — 250N000013 HC RX MED GY IP 250 OP 250 PS 637: Performed by: SURGERY

## 2023-01-24 PROCEDURE — 99239 HOSP IP/OBS DSCHRG MGMT >30: CPT | Performed by: INTERNAL MEDICINE

## 2023-01-24 PROCEDURE — 250N000013 HC RX MED GY IP 250 OP 250 PS 637: Performed by: HOSPITALIST

## 2023-01-24 RX ORDER — AMOXICILLIN 250 MG
1 CAPSULE ORAL 2 TIMES DAILY
COMMUNITY
Start: 2023-01-24 | End: 2023-03-03

## 2023-01-24 RX ORDER — CIPROFLOXACIN 500 MG/1
500 TABLET, FILM COATED ORAL 2 TIMES DAILY
Qty: 8 TABLET | Refills: 0 | Status: SHIPPED | OUTPATIENT
Start: 2023-01-24 | End: 2023-01-28

## 2023-01-24 RX ORDER — ACETAMINOPHEN 325 MG/1
975 TABLET ORAL EVERY 8 HOURS
COMMUNITY
Start: 2023-01-24

## 2023-01-24 RX ORDER — METRONIDAZOLE 500 MG/1
500 TABLET ORAL 2 TIMES DAILY
Qty: 8 TABLET | Refills: 0 | Status: SHIPPED | OUTPATIENT
Start: 2023-01-24 | End: 2023-01-28

## 2023-01-24 RX ORDER — OXYCODONE HYDROCHLORIDE 5 MG/1
5 TABLET ORAL EVERY 6 HOURS PRN
Qty: 8 TABLET | Refills: 0 | Status: SHIPPED | OUTPATIENT
Start: 2023-01-24 | End: 2023-01-27

## 2023-01-24 RX ADMIN — SERTRALINE HYDROCHLORIDE 25 MG: 25 TABLET ORAL at 09:02

## 2023-01-24 RX ADMIN — ATORVASTATIN CALCIUM 10 MG: 10 TABLET, FILM COATED ORAL at 08:35

## 2023-01-24 RX ADMIN — POLYETHYLENE GLYCOL 3350 17 G: 17 POWDER, FOR SOLUTION ORAL at 08:34

## 2023-01-24 RX ADMIN — TIMOLOL MALEATE 1 DROP: 5 SOLUTION OPHTHALMIC at 09:07

## 2023-01-24 RX ADMIN — SENNOSIDES AND DOCUSATE SODIUM 1 TABLET: 50; 8.6 TABLET ORAL at 08:38

## 2023-01-24 RX ADMIN — PIPERACILLIN AND TAZOBACTAM 3.38 G: 3; .375 INJECTION, POWDER, LYOPHILIZED, FOR SOLUTION INTRAVENOUS at 08:40

## 2023-01-24 RX ADMIN — ALLOPURINOL 300 MG: 300 TABLET ORAL at 08:37

## 2023-01-24 RX ADMIN — ACETAMINOPHEN 975 MG: 325 TABLET, FILM COATED ORAL at 11:30

## 2023-01-24 RX ADMIN — OXYCODONE HYDROCHLORIDE 5 MG: 5 TABLET ORAL at 11:12

## 2023-01-24 RX ADMIN — ACETAMINOPHEN 975 MG: 325 TABLET, FILM COATED ORAL at 03:59

## 2023-01-24 RX ADMIN — HYDROCHLOROTHIAZIDE 25 MG: 25 TABLET ORAL at 08:35

## 2023-01-24 RX ADMIN — LISINOPRIL 10 MG: 5 TABLET ORAL at 08:36

## 2023-01-24 ASSESSMENT — ACTIVITIES OF DAILY LIVING (ADL)
ADLS_ACUITY_SCORE: 40
ADLS_ACUITY_SCORE: 36
ADLS_ACUITY_SCORE: 40
ADLS_ACUITY_SCORE: 36

## 2023-01-24 NOTE — PLAN OF CARE
PRIMARY DIAGNOSIS: ACUTE PAIN  OUTPATIENT/OBSERVATION GOALS TO BE MET BEFORE DISCHARGE:  1. Pain Status: Improved-controlled with oral pain medications.    2. Return to near baseline physical activity: Yes    3. Cleared for discharge by consultants (if involved): No    Discharge Planner Nurse   Safe discharge environment identified: Yes  Barriers to discharge: No       Entered by: COREY HOFFMAN RN 01/24/2023 5:38 AM     Please review provider order for any additional goals.   Nurse to notify provider when observation goals have been met and patient is ready for discharge.

## 2023-01-24 NOTE — PLAN OF CARE
PRIMARY DIAGNOSIS: ACUTE PAIN  OUTPATIENT/OBSERVATION GOALS TO BE MET BEFORE DISCHARGE:  1. Pain Status: Improved-controlled with oral pain medications.    2. Return to near baseline physical activity: Yes    3. Cleared for discharge by consultants (if involved): No    Discharge Planner Nurse   Safe discharge environment identified: Yes  Barriers to discharge: No       Entered by: COREY HOFFMAN RN 01/24/2023 5:39 AM     Please review provider order for any additional goals.   Nurse to notify provider when observation goals have been met and patient is ready for discharge.

## 2023-01-24 NOTE — PLAN OF CARE
Physical Therapy Discharge Summary    Reason for therapy discharge:    Discharged to home.    Progress towards therapy goal(s). See goals on Care Plan in [facility] electronic health record for goal details.  Goals partially met.  Barriers to achieving goals:   discharge from facility.    Therapy recommendation(s):    Further recommended therapy is related to documented deficits, and is necessary to maximize functional independence in order for patient to return to prior level of function.      Catia Cabral, RIP 1/24/2023

## 2023-01-24 NOTE — PROGRESS NOTES
"PRIMARY DIAGNOSIS: \"GENERIC\" NURSING  OUTPATIENT/OBSERVATION GOALS TO BE MET BEFORE DISCHARGE:  1. ADLs back to baseline: Yes    2. Activity and level of assistance: Up with standby assistance.    3. Pain status: Improved-controlled with oral pain medications.    4. Return to near baseline physical activity: Yes     Discharge Planner Nurse   Safe discharge environment identified: Yes. Home  Barriers to discharge: No. Only awaiting for walker supply and ride confirmation.       Entered by: Delmi Vickers RN 01/24/2023 11:17 AM     Please review provider order for any additional goals.   Nurse to notify provider when observation goals have been met and patient is ready for discharge.  "

## 2023-01-24 NOTE — PROGRESS NOTES
ASSESSMENT:  1. Acute cholecystitis    2. Adrenal nodule (H)    3. Renal cyst, left    4. History of diabetes mellitus    5. Morbid obesity (H)    6. Hypomagnesemia        Chaparro Guillen is a 77 year old male who is s/p lap deanna on 1/22, POD# 2     PLAN:  -ADAT  -Increase activity as tolerated  -PALOMO can be removed on day of discharge  -Ok with discharge today from our standpoint, surgery orders and recs placed    Micheal Ruffin PA-C  [facility]  Surgery Clinic [address]  [address]  [place] [address]?  Office: [phone]      SUBJECTIVE:   He is doing better, pain tolerable, passing gas and BM, no n/v, tolerating diet    Patient Vitals for the past 24 hrs:   BP Temp Temp src Pulse Resp SpO2   01/24/23 0736 (!) 141/86 97.5  F (36.4  C) Oral 60 20 97 %   01/24/23 0404 112/58 97.6  F (36.4  C) Oral 60 18 96 %   01/23/23 2325 109/64 97.7  F (36.5  C) Oral 59 20 93 %   01/23/23 1518 111/73 97.6  F (36.4  C) Oral 66 20 96 %   01/23/23 1150 116/87 98.2  F (36.8  C) Oral 70 20 96 %        PHYSICAL EXAM:  GEN: No acute distress, comfortable  CV:RRR  ABD:soft, nondistended, incisions c/d/i, expected ttp  Drains: serosanguinous   EXT:No cyanosis, edema or obvious abnormalities    01/23 0700 - 01/24 0659  In: 800 [P.O.:800]  Out: 560 [Urine:500; Drains:60]    Lab Results   Component Value Date    WBC 12.3 01/24/2023    HGB 14.6 01/24/2023    HCT 43.6 01/24/2023    MCV 89 01/24/2023     01/24/2023     INR/Prothrombin Time  Recent Labs   Lab 01/24/23  0701      CO2 30*   BUN 16.8     Lab Results   Component Value Date     (H) 01/24/2023    AST 72 (H) 01/24/2023    ALKPHOS 68 01/24/2023

## 2023-01-24 NOTE — PLAN OF CARE
Goal Outcome Evaluation: MET      Problem: Plan of Care - These are the overarching goals to be used throughout the patient stay.    Goal: Plan of Care Review  Outcome: Met    Discharge instructions reviewed with the patient. No concerns.  Meds available for p/u at [facility] pharmacy. No prescriptions given.  Handouts given: D/C Instructions Lap Cholecystectomy, Low Fat Diet.  Walker provided for home use.   POD#2. Lap sites WALTER, C/D/I. PALOMO drain removed at 1155 - tolerated well.  Pain 3/10 RUQ. Pre-medicated for PALOMO removal. Otherwise, schedule Tylenol given.  Flatus & bowel sounds present. Cesar x1 this am, formed, brown.  Proactive with IS use-3000mL. Ambulated in hallway x1 with walker.  Hospitalist notified re: K+ 3.2; Mg WNL; no protocol replacement indicated.  Declined meals here. BGs <116mg/dL.

## 2023-01-25 ENCOUNTER — TELEPHONE (OUTPATIENT)
Dept: SURGERY | Facility: CLINIC | Age: 78
End: 2023-01-25
Payer: COMMERCIAL

## 2023-01-25 NOTE — TELEPHONE ENCOUNTER
Post-Op Phone Call               Surgeon: Dr. Hill    Date of Surgery: 1/22/23  Discharge Date: 1/22/23    Date/Time Called:   Date: 1/25/2023 Time: 11:55 AM   Attempt: First    Notes:  Doing very well with no complaints of pain. He is managing with Tylenol and Hydrocodone PRN. Incisions are healing well with no signs or symptoms of infection. Having regular bowel movements and eating normally.     Return to Work Plans?  Expected date: N/A  Do you need anything from us in this regard? No     Post-op appointment made? No        Thank you for your time. Please do not hesitate to call us with any questions or concerns.    Call completed by: Savannah Sapp RN

## 2023-01-26 ENCOUNTER — TELEPHONE (OUTPATIENT)
Dept: FAMILY MEDICINE | Facility: CLINIC | Age: 78
End: 2023-01-26
Payer: COMMERCIAL

## 2023-01-26 DIAGNOSIS — K81.0 ACUTE CHOLECYSTITIS: ICD-10-CM

## 2023-01-26 NOTE — TELEPHONE ENCOUNTER
Incoming call from patient calling to request for additional pain meds.  States that he was seen in the ER on 1/22 & had his gallbladder removed. Was prescribed 8 pills of Oxycodone 5 mg for pain. Been using Oxy PRN for pain relief which he only has 4 left. Patient feels like he is doing well but would like a rx for 4 more Oxycodone to get him through the weekend in case he needs it. Informed patient that PCP is not in clinic today. Will route to PCP to advise when in clinic tmrw.    Lolita Diaz, RN, BSN  [facility]

## 2023-01-27 RX ORDER — OXYCODONE HYDROCHLORIDE 5 MG/1
5 TABLET ORAL EVERY 6 HOURS PRN
Qty: 5 TABLET | Refills: 0 | Status: SHIPPED | OUTPATIENT
Start: 2023-01-27 | End: 2023-03-03

## 2023-01-27 NOTE — TELEPHONE ENCOUNTER
Please inform patient that 5 pills were sent  IF pain not improving or worsens he needs to be seen

## 2023-01-28 LAB
PATH REPORT.COMMENTS IMP SPEC: NORMAL
PATH REPORT.COMMENTS IMP SPEC: NORMAL
PATH REPORT.FINAL DX SPEC: NORMAL
PATH REPORT.GROSS SPEC: NORMAL
PATH REPORT.MICROSCOPIC SPEC OTHER STN: NORMAL
PATH REPORT.RELEVANT HX SPEC: NORMAL
PHOTO IMAGE: NORMAL

## 2023-01-28 PROCEDURE — 88304 TISSUE EXAM BY PATHOLOGIST: CPT | Mod: 26 | Performed by: PATHOLOGY

## 2023-01-29 NOTE — DISCHARGE SUMMARY
[facility]  Hospitalist Discharge Summary      Date of Admission:  1/22/2023  Date of Discharge:  1/28/2023  Discharging Provider: Sheila Cagle MD  Discharge Service: Hospitalist Service    Discharge Diagnoses    Acute cholecystitis with chololithiasis   Gangrenous gallbladder s/p ex-lap cholecystectomy     Follow-ups Needed After Discharge   Follow-up Appointments     Follow-up and recommended labs and tests       As needed with Surgery. A nurse from our office will give you a call   within 1 week to check on your progress. Please call [phone] with any   questions or concerns.         Follow-up and recommended labs and tests       Follow up with primary care provider, Ba Miguel, within 7 days   for hospital follow- up.  No follow up labs or test are needed.    Follow up with  surgery team      As advised             Unresulted Labs Ordered in the Past 30 Days of this Admission     No orders found from 12/23/2022 to 1/23/2023.      These results will be followed up by PCP    Discharge Disposition   Discharged to home  Condition at discharge: Stable      Hospital Course    77-year-old male with multiple co morbidities significant for hypertension, hyperlipidemia, type 2 diabetes mellitus, gout and anxiety presents with right upper quadrant pain, sharp, constant, 8/10 in severity, radiating toward the epigastric and lower abdomen, associated with nausea and vomiting. No fever or chills.    ER workup consistent with cholelithiasis with acute cholecystitis. He was started on empiric IV Zosyn.  Underwent laparoscopic cholecystectomy and OR findings consistent with gangrenous gallbladder with difficult dissection.    Hospital course with elevated mild elevated LFT's attributed to the extent of inflammation abutting the liver, which gradually down trended. He was continued on IV zosyn while inpatient and transitioned to oral ciprofloxacin and metronidazole for a total of 7 days of  antibiotics. He had mild soreness around the incision site which was relieved with tylenol. PALOMO drain was removed today. He was started on clears, and was advance as tolerated. He was passing flatus and had a soft BM this morning. He will be discharge later today to follow up with PCP within 1 week from discharge. Surgery clinic will call him to setup an appointment. No need for skilled therapy.          Consultations This Hospital Stay   SURGERY GENERAL IP CONSULT  CARE MANAGEMENT / SOCIAL WORK IP CONSULT  PHYSICAL THERAPY ADULT IP CONSULT    Code Status   Prior    Time Spent on this Encounter   I, Sheila Cagle MD, personally saw the patient today and spent greater than 30 minutes discharging this patient.       Sheila Cagle MD  [address] [address]  Phone: [phone]  Fax: [phone]  ______________________________________________________________________    Physical Exam   Vital Signs:                    Weight: 315 lbs 0 oz    General: AO X 3, lying comfortably in bed in no apparent distress  HEENT: pupils equal and reactive to light and accommodation, extraocular movements are intact; oral mucosa moist  Neck: Supple no raised JVD, no rigidity  Respiratory: lungs b/l clear to auscultation, no wheezing or crepitations  CVS: nl s1 s2, regular rate and rhythm, no murmur  P/A: soft, nt,nd, no guarding rigidity or rebound tenderness  Ext: no edema  Neuro: no focal neurological deficits noted, cranial nerves 2-12 grossly intact  Psychiatry: normal mood and affect  Skin: No obvious skin rashes or ulcers         Primary Care Physician   Ba Miguel    Discharge Orders      Follow-up and recommended labs and tests     As needed with Surgery. A nurse from our office will give you a call within 1 week to check on your progress. Please call [phone] with any questions or concerns.     Activity    Your activity upon discharge: activity as tolerated      Wound care and dressings    Instructions to care for your wound at home: Cover incisions as needed for drainage, ok to leave open to air. Cover drain site with gauze and tape until no longer draining, this may drain for a few days after removal. You may shower tomorrow, no soaking/bathing for 4 weeks.     Reason for your hospital stay    Abdominal pain     Follow-up and recommended labs and tests     Follow up with primary care provider, Ba Miguel, within 7 days for hospital follow- up.  No follow up labs or test are needed.    Follow up with  surgery team      As advised     Activity    Your activity upon discharge: activity as tolerated     Walker Order    I, the undersigned, certify that the above prescribed supplies are medically necessary for this patient and is both reasonable and necessary in reference to accepted standards of medical and necessary in reference to accepted standards of medical practice in the treatment of this patient's condition and is not prescribed as a convenience.      Diet    Follow this diet upon discharge: Regular     Diet    Follow this diet upon discharge: Orders Placed This Encounter      Consistent Carbohydrate Diet High Consistent Carb (75 g CHO per Meal) Diet      Diet       Significant Results and Procedures   Results for orders placed or performed during the hospital encounter of 01/22/23   CTA Chest Abdomen Pelvis w Contrast    Narrative    EXAM: CTA CHEST ABDOMEN PELVIS W CONTRAST  LOCATION: [facility]  DATE/TIME: 1/22/2023 8:19 AM    INDICATION: Back pain with abdominal pain and vomiting evaluate for dissection, and if bowel ischemia, normal gallbladder appendix  COMPARISON: None.  TECHNIQUE: CT angiogram chest abdomen pelvis during arterial phase of injection of IV contrast. 2D and 3D MIP reconstructions were performed by the CT technologist. Dose reduction techniques were used.   CONTRAST: 100ml isovue 370    FINDINGS:   CT ANGIOGRAM  CHEST, ABDOMEN, AND PELVIS: Unremarkable. No evidence of aortic aneurysm or dissection.    LUNGS AND PLEURA: Normal.    MEDIASTINUM/AXILLAE: No pericardial effusion. No lymphadenopathy.    CORONARY ARTERY CALCIFICATION: Moderate.    HEPATOBILIARY: Hepatic steatosis. Cholelithiasis. Mild pericholecystic fat stranding.    PANCREAS: Normal.    SPLEEN: Normal.    ADRENAL GLANDS: Right adrenal nodule measuring 1.1 cm. Left adrenal nodule measuring 1.3 cm.    KIDNEYS/BLADDER: Complex septated 1.5 cm cyst at the anterior interpolar region of the left kidney. No hydronephrosis.    BOWEL: No obstruction or inflammatory change.    LYMPH NODES: Normal.    PELVIC ORGANS: Normal.    MUSCULOSKELETAL: Unremarkable      Impression    IMPRESSION:  1.  Cholelithiasis and mild pericholecystic fat stranding suspicious for acute cholecystitis.    2.  Bilateral adrenal nodules measure 1.1 cm on the right and 1.3 cm on the left. Recommend follow-up per guidelines below.    3.  Complex septated left renal cyst. Recommend nonemergent follow-up renal mass protocol MRI to further characterize.    REFERENCE:  Management of Incidental Adrenal Masses: A White Paper of the ACR Incidental Findings Committee. J Am Soni Radiol 2017;14:1878-4768.    ? 1 cm and < 4 cm:     No prior imaging and no history of cancer:  1-2 cm: probably benign. Consider 12-month CT adrenal follow-up.  >2cm, <4 cm: Adrenal CT.    No prior imaging and history of cancer: Adrenal CT    Prior Imaging:  Stable for 1 year: Benign. No follow-up.  New or enlarging:  --Adrenal CT or resection if no history of cancer.  --PET/CT or biopsy if positive history of cancer.                       Discharge Medications   Discharge Medication List as of 1/24/2023 11:20 AM      START taking these medications    Details   acetaminophen (TYLENOL) 325 MG tablet Take 3 tablets (975 mg) by mouth every 8 hours, OTC      ciprofloxacin (CIPRO) 500 MG tablet Take 1 tablet (500 mg) by mouth 2 times  daily for 4 days, Disp-8 tablet, R-0, E-Prescribe      metroNIDAZOLE (FLAGYL) 500 MG tablet Take 1 tablet (500 mg) by mouth 2 times daily for 4 days, Disp-8 tablet, R-0, E-Prescribe      senna-docusate (SENOKOT-S/PERICOLACE) 8.6-50 MG tablet Take 1 tablet by mouth 2 times daily, OTC      oxyCODONE (ROXICODONE) 5 MG tablet Take 1 tablet (5 mg) by mouth every 6 hours as needed for moderate pain (4-6), Disp-8 tablet, R-0, E-Prescribe         CONTINUE these medications which have NOT CHANGED    Details   allopurinol (ZYLOPRIM) 300 MG tablet Take 1 tablet (300 mg) by mouth once daily, Disp-90 tablet, R-0, E-Prescribe      atorvastatin (LIPITOR) 10 MG tablet TAKE 1 TABLET (10 MG) BY MOUTH ONCE DAILY, Disp-90 tablet, R-1, E-Prescribe      !! blood glucose test (ONETOUCH ULTRA TEST) strips [BLOOD GLUCOSE TEST (ONETOUCH ULTRA TEST) STRIPS] TEST TWICE DAILY., Disp-200 strip, R-3, Normal      !! blood glucose test strips [BLOOD GLUCOSE TEST STRIPS] Use 1 each As Directed 2 (two) times a day. Dispense brand per patient's insurance at pharmacy discretion., Disp-200 strip, R-1, Normal      !! blood-glucose meter (ONETOUCH VERIO IQ METER) Misc [BLOOD-GLUCOSE METER (ONETOUCH VERIO IQ METER) MISC] Dispense brand per patients insurance at pharmacy discretion., Disp-1 each, R-0, Normal      furosemide (LASIX) 20 MG tablet Take 1 tablet (20 mg) by mouth once a day, Disp-90 tablet, R-3, E-Prescribe      generic lancets (ONETOUCH ULTRASOFT) [GENERIC LANCETS (ONETOUCH ULTRASOFT)] TEST TWICE DAILY. Dispense brand per patients insurance at pharmacy discretion., Disp-200 each, R-3, Normal      hydrochlorothiazide (HYDRODIURIL) 25 MG tablet TAKE 1 TABLET (25 MG) BY MOUTH ONCE DAILY, Disp-90 tablet, R-1, E-Prescribe      Lancets (ONETOUCH DELICA PLUS [id number]) MISC TEST TWICE DAILY, Disp-200 each, R-0, E-Prescribe      lisinopril (ZESTRIL) 10 MG tablet TAKE 1 TABLET (10 MG) BY MOUTH ONCE DAILY, Disp-90 tablet, R-1, E-Prescribe      metFORMIN  (GLUCOPHAGE XR) 500 MG 24 hr tablet TAKE 3 TABLETS BY MOUTH ONCE DAILY WITH BREAKFAST, Disp-270 tablet, R-1, E-Prescribe      sertraline (ZOLOFT) 25 MG tablet Take 1 tablet (25 mg total) by mouth ONCE daily., Disp-90 tablet, R-3, E-Prescribe      silver sulfADIAZINE (SILVADENE, SSD) 1 % cream [SILVER SULFADIAZINE (SILVADENE, SSD) 1 % CREAM] Apply 1 application topically 2 (two) times a day as needed.Disp-50 g, R-0Normal      timolol maleate (TIMOPTIC) 0.5 % ophthalmic solution Instill 1 drop to both eyes every morning., Historical      !! ONETOUCH VERIO IQ test strip TEST TWO TIMES A DAY AS DIRECTED, Disp-200 strip, R-1, E-Prescribe       !! - Potential duplicate medications found. Please discuss with provider.        Allergies   No Known Allergies

## 2023-02-10 DIAGNOSIS — I10 HTN (HYPERTENSION): ICD-10-CM

## 2023-02-10 DIAGNOSIS — I10 ESSENTIAL HYPERTENSION: ICD-10-CM

## 2023-02-10 DIAGNOSIS — E78.2 MIXED HYPERLIPIDEMIA: ICD-10-CM

## 2023-02-11 RX ORDER — ATORVASTATIN CALCIUM 10 MG/1
TABLET, FILM COATED ORAL
Qty: 90 TABLET | Refills: 1 | Status: SHIPPED | OUTPATIENT
Start: 2023-02-11 | End: 2023-08-28

## 2023-02-11 NOTE — TELEPHONE ENCOUNTER
"Routing refill request to provider for review/approval because:  Labs out of range:  cr    Last Written Prescription Date:  8/18/22  Last Fill Quantity: 90,  # refills: 1   Last office visit provider:  9/2/22     Requested Prescriptions   Pending Prescriptions Disp Refills     hydrochlorothiazide (HYDRODIURIL) 25 MG tablet [Pharmacy Med Name: hydroCHLOROthiazide Oral Tablet 25 MG] 90 tablet 0     Sig: TAKE 1 TABLET (25 MG) BY MOUTH ONCE DAILY       Diuretics (Including Combos) Protocol Failed - 2/10/2023  2:01 AM        Failed - Normal serum potassium on file in past 12 months     Recent Labs   Lab Test 01/24/23  0701   POTASSIUM 3.2*                    Passed - Blood pressure under 140/90 in past 12 months     BP Readings from Last 3 Encounters:   01/24/23 112/75   09/02/22 130/81   03/02/22 122/63                 Passed - Recent (12 mo) or future (30 days) visit within the authorizing provider's specialty     Patient has had an office visit with the authorizing provider or a provider within the authorizing providers department within the previous 12 mos or has a future within next 30 days. See \"Patient Info\" tab in inbasket, or \"Choose Columns\" in Meds & Orders section of the refill encounter.              Passed - Medication is active on med list        Passed - Patient is age 18 or older        Passed - Normal serum creatinine on file in past 12 months     Recent Labs   Lab Test 01/24/23  0701   CR 0.96              Passed - Normal serum sodium on file in past 12 months     Recent Labs   Lab Test 01/24/23  0701                    atorvastatin (LIPITOR) 10 MG tablet [Pharmacy Med Name: Atorvastatin Calcium Oral Tablet 10 MG] 90 tablet 0     Sig: TAKE 1 TABLET (10 MG) BY MOUTH ONCE DAILY       Statins Protocol Passed - 2/10/2023  2:01 AM        Passed - LDL on file in past 12 months     Recent Labs   Lab Test 03/02/22  0734   LDL 38             Passed - No abnormal creatine kinase in past 12 months     No " "lab results found.             Passed - Recent (12 mo) or future (30 days) visit within the authorizing provider's specialty     Patient has had an office visit with the authorizing provider or a provider within the authorizing providers department within the previous 12 mos or has a future within next 30 days. See \"Patient Info\" tab in inbasket, or \"Choose Columns\" in Meds & Orders section of the refill encounter.              Passed - Medication is active on med list        Passed - Patient is age 18 or older           lisinopril (ZESTRIL) 10 MG tablet [Pharmacy Med Name: Lisinopril Oral Tablet 10 MG] 90 tablet 0     Sig: TAKE 1 TABLET (10 MG) BY MOUTH ONCE DAILY       ACE Inhibitors (Including Combos) Protocol Failed - 2/10/2023  2:01 AM        Failed - Normal serum potassium on file in past 12 months     Recent Labs   Lab Test 01/24/23  0701   POTASSIUM 3.2*             Passed - Blood pressure under 140/90 in past 12 months     BP Readings from Last 3 Encounters:   01/24/23 112/75   09/02/22 130/81   03/02/22 122/63                 Passed - Recent (12 mo) or future (30 days) visit within the authorizing provider's specialty     Patient has had an office visit with the authorizing provider or a provider within the authorizing providers department within the previous 12 mos or has a future within next 30 days. See \"Patient Info\" tab in inbasket, or \"Choose Columns\" in Meds & Orders section of the refill encounter.              Passed - Medication is active on med list        Passed - Patient is age 18 or older        Passed - Normal serum creatinine on file in past 12 months     Recent Labs   Lab Test 01/24/23  0701   CR 0.96       Ok to refill medication if creatinine is low               Shagufta Choudhary, RN 02/11/23 4:46 PM  "

## 2023-02-13 RX ORDER — LISINOPRIL 10 MG/1
TABLET ORAL
Qty: 90 TABLET | Refills: 0 | Status: SHIPPED | OUTPATIENT
Start: 2023-02-13 | End: 2023-05-12

## 2023-02-13 RX ORDER — HYDROCHLOROTHIAZIDE 25 MG/1
TABLET ORAL
Qty: 90 TABLET | Refills: 0 | Status: SHIPPED | OUTPATIENT
Start: 2023-02-13 | End: 2023-03-03

## 2023-03-03 ENCOUNTER — OFFICE VISIT (OUTPATIENT)
Dept: FAMILY MEDICINE | Facility: CLINIC | Age: 78
End: 2023-03-03
Payer: COMMERCIAL

## 2023-03-03 VITALS
OXYGEN SATURATION: 97 % | HEIGHT: 69 IN | TEMPERATURE: 98.2 F | RESPIRATION RATE: 20 BRPM | WEIGHT: 283 LBS | DIASTOLIC BLOOD PRESSURE: 76 MMHG | HEART RATE: 71 BPM | SYSTOLIC BLOOD PRESSURE: 103 MMHG | BODY MASS INDEX: 41.92 KG/M2

## 2023-03-03 DIAGNOSIS — Z00.00 HEALTH CARE MAINTENANCE: Primary | ICD-10-CM

## 2023-03-03 DIAGNOSIS — E11.9 TYPE 2 DIABETES MELLITUS WITHOUT COMPLICATION, WITHOUT LONG-TERM CURRENT USE OF INSULIN (H): ICD-10-CM

## 2023-03-03 DIAGNOSIS — E78.2 MIXED HYPERLIPIDEMIA: ICD-10-CM

## 2023-03-03 DIAGNOSIS — I10 ESSENTIAL HYPERTENSION: ICD-10-CM

## 2023-03-03 DIAGNOSIS — E66.01 MORBID OBESITY (H): ICD-10-CM

## 2023-03-03 DIAGNOSIS — Z12.11 SCREEN FOR COLON CANCER: ICD-10-CM

## 2023-03-03 LAB
ALBUMIN SERPL BCG-MCNC: 4 G/DL (ref 3.5–5.2)
ALP SERPL-CCNC: 64 U/L (ref 40–129)
ALT SERPL W P-5'-P-CCNC: 22 U/L (ref 10–50)
ANION GAP SERPL CALCULATED.3IONS-SCNC: 18 MMOL/L (ref 7–15)
AST SERPL W P-5'-P-CCNC: 25 U/L (ref 10–50)
BILIRUB SERPL-MCNC: 1.2 MG/DL
BUN SERPL-MCNC: 24.6 MG/DL (ref 8–23)
CALCIUM SERPL-MCNC: 9.5 MG/DL (ref 8.8–10.2)
CHLORIDE SERPL-SCNC: 97 MMOL/L (ref 98–107)
CHOLEST SERPL-MCNC: 97 MG/DL
CREAT SERPL-MCNC: 1.15 MG/DL (ref 0.67–1.17)
DEPRECATED HCO3 PLAS-SCNC: 18 MMOL/L (ref 22–29)
ERYTHROCYTE [DISTWIDTH] IN BLOOD BY AUTOMATED COUNT: 14.3 % (ref 10–15)
GFR SERPL CREATININE-BSD FRML MDRD: 66 ML/MIN/1.73M2
GLUCOSE SERPL-MCNC: 110 MG/DL (ref 70–99)
HBA1C MFR BLD: 5.6 % (ref 0–5.6)
HCT VFR BLD AUTO: 46.5 % (ref 40–53)
HDLC SERPL-MCNC: 43 MG/DL
HGB BLD-MCNC: 15.8 G/DL (ref 13.3–17.7)
HOLD SPECIMEN: NORMAL
HOLD SPECIMEN: NORMAL
LDLC SERPL CALC-MCNC: 32 MG/DL
MCH RBC QN AUTO: 29.4 PG (ref 26.5–33)
MCHC RBC AUTO-ENTMCNC: 34 G/DL (ref 31.5–36.5)
MCV RBC AUTO: 86 FL (ref 78–100)
NONHDLC SERPL-MCNC: 54 MG/DL
PLATELET # BLD AUTO: 254 10E3/UL (ref 150–450)
POTASSIUM SERPL-SCNC: 3.5 MMOL/L (ref 3.4–5.3)
PROT SERPL-MCNC: 7.8 G/DL (ref 6.4–8.3)
RBC # BLD AUTO: 5.38 10E6/UL (ref 4.4–5.9)
SODIUM SERPL-SCNC: 133 MMOL/L (ref 136–145)
TRIGL SERPL-MCNC: 112 MG/DL
WBC # BLD AUTO: 11.6 10E3/UL (ref 4–11)

## 2023-03-03 PROCEDURE — 36415 COLL VENOUS BLD VENIPUNCTURE: CPT | Performed by: FAMILY MEDICINE

## 2023-03-03 PROCEDURE — 80061 LIPID PANEL: CPT | Performed by: FAMILY MEDICINE

## 2023-03-03 PROCEDURE — 80053 COMPREHEN METABOLIC PANEL: CPT | Performed by: FAMILY MEDICINE

## 2023-03-03 PROCEDURE — 83036 HEMOGLOBIN GLYCOSYLATED A1C: CPT | Performed by: FAMILY MEDICINE

## 2023-03-03 PROCEDURE — 99213 OFFICE O/P EST LOW 20 MIN: CPT | Performed by: FAMILY MEDICINE

## 2023-03-03 PROCEDURE — 85027 COMPLETE CBC AUTOMATED: CPT | Performed by: FAMILY MEDICINE

## 2023-03-03 RX ORDER — HYDROCHLOROTHIAZIDE 12.5 MG/1
12.5 TABLET ORAL DAILY
Qty: 90 TABLET | Refills: 1 | Status: SHIPPED | OUTPATIENT
Start: 2023-03-03 | End: 2023-08-28

## 2023-03-03 NOTE — PROGRESS NOTES
"  Assessment & Plan     Type 2 diabetes mellitus without complication, without long-term current use of insulin (H)  A1c returns at goal range continue with metformin at this time  Check kidney function today  Continue to work on healthy eating and regular exercise    Screen for colon cancer  Patient no longer interested in colon cancer screening after the age of 75    Essential hypertension  Blood pressure is on the low end of normal however patient expresses some dizziness with change in position we will cut his hydrochlorothiazide in half and monitor symptoms  - hydrochlorothiazide (HYDRODIURIL) 12.5 MG tablet; Take 1 tablet (12.5 mg) by mouth daily    Health care maintenance  Obtain blood work and follow-up based on results  - CBC with platelets; Future  - Comprehensive metabolic panel (BMP + Alb, Alk Phos, ALT, AST, Total. Bili, TP); Future  - CBC with platelets  - Comprehensive metabolic panel (BMP + Alb, Alk Phos, ALT, AST, Total. Bili, TP)    Mixed hyperlipidemia  Patient on statin therapy we will check cholesterol levels today  - Lipid panel reflex to direct LDL Fasting; Future  - Lipid panel reflex to direct LDL Fasting    Morbid obesity (H)  Patient is down about 30 pounds since his gallbladder removal a couple months ago  Congratulated him on the success and indicated likely the reason why the blood pressure has decreased  Continue with healthy eating and regular exercise           BMI:   Estimated body mass index is 41.79 kg/m  as calculated from the following:    Height as of this encounter: 1.753 m (5' 9\").    Weight as of this encounter: 128.4 kg (283 lb).       No follow-ups on file.    Ba Miguel MD  [facility]    Mario Mayfield is a 77 year old, presenting for the following health issues:  Diabetes (Fasting labs )  77-year-old here for medication check  Patient is a diabetic has been well controlled with metformin his A1c returns below 6% we will " "continue with current dosing.  He no longer wants wishes to pursue colon cancer screening.  Blood pressure in the low end of normal but has been having some dizziness    Cut hydrochlorothiazide dosing in half to 12 and half milligrams.  Obtain fasting blood work today  Reviewed records from his gallbladder removal surgery in January  Patient is aware of his weight and is down about 30 pounds since his gallbladder removal he is working on healthier eating and regular exercise.  He is on statin therapy we will check cholesterol levels today and follow-up based on results.  Up-to-date on immunizations.    History of Present Illness       Diabetes:   He presents for follow up of diabetes.  He is checking home blood glucose one time daily. He checks blood glucose before meals.  Blood glucose is never over 200 and never under 70. When his blood glucose is low, the patient is asymptomatic for confusion, blurred vision, lethargy and reports not feeling dizzy, shaky, or weak.  He has no concerns regarding his diabetes at this time.  He is having numbness in feet.         He eats 2-3 servings of fruits and vegetables daily.He consumes 2 sweetened beverage(s) daily.He exercises with enough effort to increase his heart rate 9 or less minutes per day.  He exercises with enough effort to increase his heart rate 3 or less days per week.   He is taking medications regularly.             Review of Systems         Objective    /76 (BP Location: Left arm, Patient Position: Sitting, Cuff Size: Adult Large)   Pulse 71   Temp 98.2  F (36.8  C) (Oral)   Resp 20   Ht 1.753 m (5' 9\")   Wt 128.4 kg (283 lb)   SpO2 97%   BMI 41.79 kg/m    Body mass index is 41.79 kg/m .  Physical Exam   GENERAL: healthy, alert and no distress  EYES: Eyes grossly normal to inspection, PERRL and conjunctivae and sclerae normal  RESP: lungs clear to auscultation - no rales, rhonchi or wheezes  CV: regular rate and rhythm, normal S1 S2, no S3 or S4, " no murmur, click or rub, no peripheral edema and peripheral pulses strong  ABDOMEN: bowel sounds normal and BMI 41  MS: no gross musculoskeletal defects noted, no edema  NEURO: Normal strength and tone, mentation intact and speech normal  PSYCH: mentation appears normal, affect normal/bright

## 2023-03-03 NOTE — PATIENT INSTRUCTIONS
Hydrochlorothiazide changing to 12.5mg daily  In 6 months we will order CT follow up on adrenal cysts and kidney cyst

## 2023-03-04 DIAGNOSIS — E11.9 TYPE 2 DIABETES MELLITUS WITHOUT COMPLICATION, WITHOUT LONG-TERM CURRENT USE OF INSULIN (H): ICD-10-CM

## 2023-03-05 RX ORDER — LANCETS 30 GAUGE
EACH MISCELLANEOUS
Qty: 200 EACH | Refills: 1 | Status: SHIPPED | OUTPATIENT
Start: 2023-03-05 | End: 2023-09-29

## 2023-03-05 RX ORDER — BLOOD SUGAR DIAGNOSTIC
STRIP MISCELLANEOUS
Qty: 200 STRIP | Refills: 1 | Status: SHIPPED | OUTPATIENT
Start: 2023-03-05 | End: 2024-03-29

## 2023-03-05 NOTE — TELEPHONE ENCOUNTER
"    Last Written Prescription Date:  9/16/22  Last Fill Quantity: 200,  # refills: 0   Last office visit provider:  3/3/23     Requested Prescriptions   Pending Prescriptions Disp Refills     Lancets (ONETOUCH DELICA PLUS [id number]) MISC [Pharmacy Med Name: OneTouch Delica Plus [id number] Miscellaneous] 200 each 0     Sig: TEST TWICE DAILY       Diabetic Supplies Protocol Failed - 3/4/2023 10:51 AM        Failed - Recent (6 mo) or future (30 days) visit within the authorizing provider's specialty     Patient had office visit in the last 6 months or has a visit in the next 30 days with authorizing provider.  See \"Patient Info\" tab in inbasket, or \"Choose Columns\" in Meds & Orders section of the refill encounter.            Passed - Medication is active on med list        Passed - Patient is 18 years of age or older           ONETOUCH VERIO IQ test strip [Pharmacy Med Name: OneTouch Verio In Vitro Strip]  0     Sig: TEST TWO TIMES A DAY AS DIRECTED       Diabetic Supplies Protocol Failed - 3/4/2023 10:51 AM        Failed - Recent (6 mo) or future (30 days) visit within the authorizing provider's specialty     Patient had office visit in the last 6 months or has a visit in the next 30 days with authorizing provider.  See \"Patient Info\" tab in inbasket, or \"Choose Columns\" in Meds & Orders section of the refill encounter.            Passed - Medication is active on med list        Passed - Patient is 18 years of age or older             Shagufta Choudhary RN 03/05/23 12:10 PM  "

## 2023-03-10 DIAGNOSIS — E11.9 TYPE 2 DIABETES MELLITUS WITHOUT COMPLICATION, WITHOUT LONG-TERM CURRENT USE OF INSULIN (H): ICD-10-CM

## 2023-03-10 RX ORDER — METFORMIN HCL 500 MG
TABLET, EXTENDED RELEASE 24 HR ORAL
Qty: 270 TABLET | Refills: 1 | Status: SHIPPED | OUTPATIENT
Start: 2023-03-10 | End: 2023-09-06

## 2023-03-10 NOTE — TELEPHONE ENCOUNTER
"Last Written Prescription Date:  9/13/22  Last Fill Quantity: 270,  # refills: 1   Last office visit provider:  3/3/23     Requested Prescriptions   Pending Prescriptions Disp Refills     metFORMIN (GLUCOPHAGE XR) 500 MG 24 hr tablet [Pharmacy Med Name: metFORMIN HCl ER Oral Tablet Extended Release 24 Hour 500 MG] 270 tablet 0     Sig: TAKE 3 TABLETS BY MOUTH ONCE DAILY WITH BREAKFAST       Biguanide Agents Passed - 3/10/2023  2:00 AM        Passed - Patient is age 10 or older        Passed - Patient has documented A1c within the specified period of time.     If HgbA1C is 8 or greater, it needs to be on file within the past 3 months.  If less than 8, must be on file within the past 6 months.     Recent Labs   Lab Test 03/03/23  1008   A1C 5.6             Passed - Patient's CR is NOT>1.4 OR Patient's EGFR is NOT<45 within past 12 mos.     Recent Labs   Lab Test 03/03/23  1008 08/27/21  0840 02/24/21  0915   GFRESTIMATED 66   < > >60   GFRESTBLACK  --   --  >60    < > = values in this interval not displayed.       Recent Labs   Lab Test 03/03/23  1008   CR 1.15             Passed - Patient does NOT have a diagnosis of CHF.        Passed - Medication is active on med list        Passed - Recent (6 mo) or future (30 days) visit within the authorizing provider's specialty     Patient had office visit in the last 6 months or has a visit in the next 30 days with authorizing provider or within the authorizing provider's specialty.  See \"Patient Info\" tab in inbasket, or \"Choose Columns\" in Meds & Orders section of the refill encounter.                 Yohsi Tyler RN 03/10/23 1:46 PM  "

## 2023-04-13 DIAGNOSIS — Z79.899 MEDICATION MANAGEMENT: ICD-10-CM

## 2023-04-14 RX ORDER — ALLOPURINOL 300 MG/1
TABLET ORAL
Qty: 90 TABLET | Refills: 0 | Status: SHIPPED | OUTPATIENT
Start: 2023-04-14 | End: 2023-07-11

## 2023-04-14 NOTE — TELEPHONE ENCOUNTER
"Routing refill request to provider for review/approval because:  Labs not current:  Uric Acid    Last Written Prescription Date:  1/18/2023  Last Fill Quantity: 90,  # refills: 0   Last office visit provider:  3/3/2023     Requested Prescriptions   Pending Prescriptions Disp Refills     allopurinol (ZYLOPRIM) 300 MG tablet [Pharmacy Med Name: Allopurinol Oral Tablet 300 MG] 90 tablet 0     Sig: Take 1 tablet (300 mg) by mouth once daily       Gout Agents Protocol Failed - 4/13/2023 10:46 AM        Failed - Has Uric Acid on file in past 12 months and value is less than 6     No lab results found.  If level is 6mg/dL or greater, ok to refill one time and refer to provider.           Passed - CBC on file in past 12 months     Recent Labs   Lab Test 03/03/23  1008   WBC 11.6*   RBC 5.38   HGB 15.8   HCT 46.5                    Passed - ALT on file in past 12 months     Recent Labs   Lab Test 03/03/23  1008   ALT 22             Passed - Recent (12 mo) or future (30 days) visit within the authorizing provider's specialty     Patient has had an office visit with the authorizing provider or a provider within the authorizing providers department within the previous 12 mos or has a future within next 30 days. See \"Patient Info\" tab in inbasket, or \"Choose Columns\" in Meds & Orders section of the refill encounter.              Passed - Medication is active on med list        Passed - Patient is age 18 or older        Passed - Normal serum creatinine on file in the past 12 months     Recent Labs   Lab Test 03/03/23  1008   CR 1.15       Ok to refill medication if creatinine is low               Elisha Tijerina RN 04/14/23 1:05 PM      "

## 2023-05-12 DIAGNOSIS — I10 HTN (HYPERTENSION): ICD-10-CM

## 2023-05-12 DIAGNOSIS — I10 ESSENTIAL HYPERTENSION: ICD-10-CM

## 2023-05-12 RX ORDER — HYDROCHLOROTHIAZIDE 25 MG/1
TABLET ORAL
Qty: 90 TABLET | Refills: 0 | OUTPATIENT
Start: 2023-05-12

## 2023-05-12 RX ORDER — LISINOPRIL 10 MG/1
TABLET ORAL
Qty: 90 TABLET | Refills: 3 | Status: SHIPPED | OUTPATIENT
Start: 2023-05-12 | End: 2024-05-06

## 2023-05-12 NOTE — TELEPHONE ENCOUNTER
"Last Written Prescription Date:  2/13/23  Last Fill Quantity: 90,  # refills: 0   Last office visit provider:  3/3/23     Requested Prescriptions   Pending Prescriptions Disp Refills     lisinopril (ZESTRIL) 10 MG tablet [Pharmacy Med Name: Lisinopril Oral Tablet 10 MG] 90 tablet 0     Sig: TAKE 1 TABLET (10 MG) BY MOUTH ONCE DAILY       ACE Inhibitors (Including Combos) Protocol Passed - 5/12/2023  2:01 AM        Passed - Blood pressure under 140/90 in past 12 months     BP Readings from Last 3 Encounters:   03/03/23 103/76   01/24/23 112/75   09/02/22 130/81                 Passed - Recent (12 mo) or future (30 days) visit within the authorizing provider's specialty     Patient has had an office visit with the authorizing provider or a provider within the authorizing providers department within the previous 12 mos or has a future within next 30 days. See \"Patient Info\" tab in inbasket, or \"Choose Columns\" in Meds & Orders section of the refill encounter.              Passed - Medication is active on med list        Passed - Patient is age 18 or older        Passed - Normal serum creatinine on file in past 12 months     Recent Labs   Lab Test 03/03/23  1008   CR 1.15       Ok to refill medication if creatinine is low          Passed - Normal serum potassium on file in past 12 months     Recent Labs   Lab Test 03/03/23  1008   POTASSIUM 3.5              Refused Prescriptions Disp Refills     hydrochlorothiazide (HYDRODIURIL) 25 MG tablet [Pharmacy Med Name: hydroCHLOROthiazide Oral Tablet 25 MG] 90 tablet 0     Sig: TAKE 1 TABLET (25 MG) BY MOUTH ONCE DAILY       Diuretics (Including Combos) Protocol Failed - 5/12/2023  2:01 AM        Failed - Normal serum sodium on file in past 12 months     Recent Labs   Lab Test 03/03/23  1008   *              Passed - Blood pressure under 140/90 in past 12 months     BP Readings from Last 3 Encounters:   03/03/23 103/76   01/24/23 112/75   09/02/22 130/81                 " "Passed - Recent (12 mo) or future (30 days) visit within the authorizing provider's specialty     Patient has had an office visit with the authorizing provider or a provider within the authorizing providers department within the previous 12 mos or has a future within next 30 days. See \"Patient Info\" tab in inbasket, or \"Choose Columns\" in Meds & Orders section of the refill encounter.              Passed - Medication is active on med list        Passed - Patient is age 18 or older        Passed - Normal serum creatinine on file in past 12 months     Recent Labs   Lab Test 03/03/23  1008   CR 1.15              Passed - Normal serum potassium on file in past 12 months     Recent Labs   Lab Test 03/03/23  1008   POTASSIUM 3.5                         Maria Fernanda Koch RN 05/12/23 3:50 PM  "

## 2023-07-11 DIAGNOSIS — Z79.899 MEDICATION MANAGEMENT: ICD-10-CM

## 2023-07-11 RX ORDER — ALLOPURINOL 300 MG/1
TABLET ORAL
Qty: 90 TABLET | Refills: 0 | Status: SHIPPED | OUTPATIENT
Start: 2023-07-11 | End: 2023-09-18

## 2023-07-11 NOTE — TELEPHONE ENCOUNTER
"Routing refill request to provider for review/approval because:  Labs not current:  Uric acid    Last Written Prescription Date:  4/14/23  Last Fill Quantity: 90,  # refills: 0   Last office visit provider:  3/3/23     Requested Prescriptions   Pending Prescriptions Disp Refills     allopurinol (ZYLOPRIM) 300 MG tablet [Pharmacy Med Name: Allopurinol Oral Tablet 300 MG] 90 tablet 0     Sig: Take 1 tablet (300 mg) by mouth once daily       Gout Agents Protocol Failed - 7/11/2023  2:26 AM        Failed - Has Uric Acid on file in past 12 months and value is less than 6     No lab results found.  If level is 6mg/dL or greater, ok to refill one time and refer to provider.           Passed - CBC on file in past 12 months     Recent Labs   Lab Test 03/03/23  1008   WBC 11.6*   RBC 5.38   HGB 15.8   HCT 46.5                    Passed - ALT on file in past 12 months     Recent Labs   Lab Test 03/03/23  1008   ALT 22             Passed - Recent (12 mo) or future (30 days) visit within the authorizing provider's specialty     Patient has had an office visit with the authorizing provider or a provider within the authorizing providers department within the previous 12 mos or has a future within next 30 days. See \"Patient Info\" tab in inbasket, or \"Choose Columns\" in Meds & Orders section of the refill encounter.              Passed - Medication is active on med list        Passed - Patient is age 18 or older        Passed - Normal serum creatinine on file in the past 12 months     Recent Labs   Lab Test 03/03/23  1008   CR 1.15       Ok to refill medication if creatinine is low               Poly Leon RN 07/11/23 2:26 AM  " work

## 2023-08-28 DIAGNOSIS — E78.2 MIXED HYPERLIPIDEMIA: ICD-10-CM

## 2023-08-28 DIAGNOSIS — I10 ESSENTIAL HYPERTENSION: ICD-10-CM

## 2023-08-28 RX ORDER — HYDROCHLOROTHIAZIDE 12.5 MG/1
12.5 TABLET ORAL DAILY
Qty: 90 TABLET | Refills: 0 | Status: SHIPPED | OUTPATIENT
Start: 2023-08-28 | End: 2023-11-07

## 2023-08-28 RX ORDER — ATORVASTATIN CALCIUM 10 MG/1
TABLET, FILM COATED ORAL
Qty: 90 TABLET | Refills: 1 | Status: SHIPPED | OUTPATIENT
Start: 2023-08-28 | End: 2024-02-05

## 2023-08-28 NOTE — TELEPHONE ENCOUNTER
"Routing refill request to provider for review/approval because:  Labs out of range:  sodium    Last Written Prescription Date:  3/3/2023  Last Fill Quantity: 90,  # refills: 1   Last office visit provider:  3/3/2023     Requested Prescriptions   Pending Prescriptions Disp Refills    hydrochlorothiazide (HYDRODIURIL) 12.5 MG tablet [Pharmacy Med Name: hydroCHLOROthiazide Oral Tablet 12.5 MG] 90 tablet 0     Sig: Take 1 tablet (12.5 mg) by mouth daily       Diuretics (Including Combos) Protocol Failed - 8/28/2023 10:11 AM        Failed - Normal serum sodium on file in past 12 months     Recent Labs   Lab Test 03/03/23  1008   *              Passed - Blood pressure under 140/90 in past 12 months     BP Readings from Last 3 Encounters:   03/03/23 103/76   01/24/23 112/75   09/02/22 130/81                 Passed - Recent (12 mo) or future (30 days) visit within the authorizing provider's specialty     Patient has had an office visit with the authorizing provider or a provider within the authorizing providers department within the previous 12 mos or has a future within next 30 days. See \"Patient Info\" tab in inbasket, or \"Choose Columns\" in Meds & Orders section of the refill encounter.              Passed - Medication is active on med list        Passed - Patient is age 18 or older        Passed - Normal serum creatinine on file in past 12 months     Recent Labs   Lab Test 03/03/23  1008   CR 1.15              Passed - Normal serum potassium on file in past 12 months     Recent Labs   Lab Test 03/03/23  1008   POTASSIUM 3.5               Last Written Prescription Date:  2/11/2023  Last Fill Quantity: 90,  # refills: 1   Last office visit provider:  3/3/2023        atorvastatin (LIPITOR) 10 MG tablet [Pharmacy Med Name: Atorvastatin Calcium Oral Tablet 10 MG] 90 tablet 0     Sig: TAKE 1 TABLET (10 MG) BY MOUTH ONCE DAILY       Statins Protocol Passed - 8/28/2023 10:11 AM        Passed - LDL on file in past 12 months " "    Recent Labs   Lab Test 03/03/23  1008   LDL 32             Passed - No abnormal creatine kinase in past 12 months     No lab results found.             Passed - Recent (12 mo) or future (30 days) visit within the authorizing provider's specialty     Patient has had an office visit with the authorizing provider or a provider within the authorizing providers department within the previous 12 mos or has a future within next 30 days. See \"Patient Info\" tab in inbasket, or \"Choose Columns\" in Meds & Orders section of the refill encounter.              Passed - Medication is active on med list        Passed - Patient is age 18 or older             Elisha Tijerina RN 08/28/23 3:16 PM  "

## 2023-09-06 ENCOUNTER — OFFICE VISIT (OUTPATIENT)
Dept: FAMILY MEDICINE | Facility: CLINIC | Age: 78
End: 2023-09-06
Payer: COMMERCIAL

## 2023-09-06 VITALS
HEART RATE: 62 BPM | DIASTOLIC BLOOD PRESSURE: 81 MMHG | RESPIRATION RATE: 16 BRPM | BODY MASS INDEX: 42.83 KG/M2 | SYSTOLIC BLOOD PRESSURE: 120 MMHG | OXYGEN SATURATION: 96 % | WEIGHT: 290 LBS

## 2023-09-06 DIAGNOSIS — E66.01 CLASS 3 SEVERE OBESITY DUE TO EXCESS CALORIES WITH SERIOUS COMORBIDITY AND BODY MASS INDEX (BMI) OF 40.0 TO 44.9 IN ADULT (H): ICD-10-CM

## 2023-09-06 DIAGNOSIS — I10 PRIMARY HYPERTENSION: ICD-10-CM

## 2023-09-06 DIAGNOSIS — R60.9 EDEMA, UNSPECIFIED TYPE: Primary | ICD-10-CM

## 2023-09-06 DIAGNOSIS — E11.9 TYPE 2 DIABETES MELLITUS WITHOUT COMPLICATION, WITHOUT LONG-TERM CURRENT USE OF INSULIN (H): ICD-10-CM

## 2023-09-06 DIAGNOSIS — F41.9 ANXIETY: ICD-10-CM

## 2023-09-06 DIAGNOSIS — E66.813 CLASS 3 SEVERE OBESITY DUE TO EXCESS CALORIES WITH SERIOUS COMORBIDITY AND BODY MASS INDEX (BMI) OF 40.0 TO 44.9 IN ADULT (H): ICD-10-CM

## 2023-09-06 LAB
ANION GAP SERPL CALCULATED.3IONS-SCNC: 14 MMOL/L (ref 7–15)
BUN SERPL-MCNC: 16.8 MG/DL (ref 8–23)
CALCIUM SERPL-MCNC: 9 MG/DL (ref 8.8–10.2)
CHLORIDE SERPL-SCNC: 99 MMOL/L (ref 98–107)
CREAT SERPL-MCNC: 1 MG/DL (ref 0.67–1.17)
CREAT UR-MCNC: 85 MG/DL
DEPRECATED HCO3 PLAS-SCNC: 24 MMOL/L (ref 22–29)
EGFRCR SERPLBLD CKD-EPI 2021: 78 ML/MIN/1.73M2
GLUCOSE SERPL-MCNC: 96 MG/DL (ref 70–99)
HBA1C MFR BLD: 5.3 % (ref 0–5.6)
HOLD SPECIMEN: NORMAL
HOLD SPECIMEN: NORMAL
MICROALBUMIN UR-MCNC: <12 MG/L
MICROALBUMIN/CREAT UR: NORMAL MG/G{CREAT}
POTASSIUM SERPL-SCNC: 3.8 MMOL/L (ref 3.4–5.3)
SODIUM SERPL-SCNC: 137 MMOL/L (ref 136–145)

## 2023-09-06 PROCEDURE — 36415 COLL VENOUS BLD VENIPUNCTURE: CPT | Performed by: FAMILY MEDICINE

## 2023-09-06 PROCEDURE — 82570 ASSAY OF URINE CREATININE: CPT | Performed by: FAMILY MEDICINE

## 2023-09-06 PROCEDURE — 82043 UR ALBUMIN QUANTITATIVE: CPT | Performed by: FAMILY MEDICINE

## 2023-09-06 PROCEDURE — 80048 BASIC METABOLIC PNL TOTAL CA: CPT | Performed by: FAMILY MEDICINE

## 2023-09-06 PROCEDURE — 99214 OFFICE O/P EST MOD 30 MIN: CPT | Mod: 25 | Performed by: FAMILY MEDICINE

## 2023-09-06 PROCEDURE — 83036 HEMOGLOBIN GLYCOSYLATED A1C: CPT | Performed by: FAMILY MEDICINE

## 2023-09-06 PROCEDURE — G0008 ADMIN INFLUENZA VIRUS VAC: HCPCS | Performed by: FAMILY MEDICINE

## 2023-09-06 PROCEDURE — 90662 IIV NO PRSV INCREASED AG IM: CPT | Performed by: FAMILY MEDICINE

## 2023-09-06 RX ORDER — FUROSEMIDE 20 MG
TABLET ORAL
Qty: 90 TABLET | Refills: 3 | Status: SHIPPED | OUTPATIENT
Start: 2023-09-06 | End: 2024-09-06

## 2023-09-06 RX ORDER — METFORMIN HCL 500 MG
1000 TABLET, EXTENDED RELEASE 24 HR ORAL
Qty: 180 TABLET | Refills: 3 | Status: SHIPPED | OUTPATIENT
Start: 2023-09-06

## 2023-09-06 RX ORDER — SERTRALINE HYDROCHLORIDE 25 MG/1
TABLET, FILM COATED ORAL
Qty: 90 TABLET | Refills: 3 | Status: SHIPPED | OUTPATIENT
Start: 2023-09-06 | End: 2024-09-06

## 2023-09-06 RX ORDER — METFORMIN HCL 500 MG
TABLET, EXTENDED RELEASE 24 HR ORAL
Qty: 270 TABLET | Refills: 1 | Status: CANCELLED | OUTPATIENT
Start: 2023-09-06

## 2023-09-06 NOTE — PROGRESS NOTES
"  Assessment & Plan     Type 2 diabetes mellitus without complication, without long-term current use of insulin (H)  A1c continues to be at goal range still below 6%  Discussed decreasing metformin to just 2 tablets daily patient is hesitant about going down further  Recommend follow-up in 6 months  Continue with healthy eating and regular exercise  - Albumin Random Urine Quantitative with Creat Ratio; Future  - HEMOGLOBIN A1C; Future  - HEMOGLOBIN A1C  - metFORMIN (GLUCOPHAGE XR) 500 MG 24 hr tablet; Take 2 tablets (1,000 mg) by mouth daily (with breakfast)  - Albumin Random Urine Quantitative with Creat Ratio    Edema, unspecified type  Edema well controlled with Lasix continue with current dosing  Check kidney function and electrolytes with potassium today  - furosemide (LASIX) 20 MG tablet; Take 1 tablet (20 mg) by mouth once a day  - Basic metabolic panel  (Ca, Cl, CO2, Creat, Gluc, K, Na, BUN); Future  - Basic metabolic panel  (Ca, Cl, CO2, Creat, Gluc, K, Na, BUN)    Anxiety  Doing well on current dosing of sertraline continue refills upon request  - sertraline (ZOLOFT) 25 MG tablet; Take 1 tablet (25 mg total) by mouth ONCE daily.    Primary hypertension  Blood pressure at goal range continue with current medications check kidney function electrolytes today    Class 3 severe obesity due to excess calories with serious comorbidity and body mass index (BMI) of 40.0 to 44.9 in adult (H)  Patient is been working on healthy eating and trying to get regular exercise  Weight is up a little since his last visit which is frustrating for him but I encouraged him to continue to work on the process         BMI:   Estimated body mass index is 42.83 kg/m  as calculated from the following:    Height as of 3/3/23: 1.753 m (5' 9\").    Weight as of this encounter: 131.5 kg (290 lb).       Ba Mgiuel MD  [facility]    Mario Mayfield is a 78 year old, presenting for the following " health issues:  Diabetes (Medication check, fasting labs )        9/6/2023     9:09 AM   Additional Questions   Roomed by Loida CARDONA CMA       History of Present Illness       Reason for visit:  Check    He eats 0-1 servings of fruits and vegetables daily.He consumes 3 sweetened beverage(s) daily.He exercises with enough effort to increase his heart rate 9 or less minutes per day.  He exercises with enough effort to increase his heart rate 3 or less days per week.   He is taking medications regularly.  Patient here for medication check.  Patient is a diabetic has been controlled well with last few years on metformin his A1c returns below 6% we discussed decreasing or stopping metformin he is hesitant about stopping it entirely but is willing to go down to just 2 tablets daily.  He is been working on diet and exercise but is frustrated with low bit of weight gain we discussed continuing to work on the process.  He is on frusemide for edema in the lower extremity he appears euvolemic on exam-continue with current medication and check electrolytes today.  His blood pressure is at goal range we will continue with current medications he is on statin therapy and will continue to check lipids yearly.  He is on an SSRI for history of anxiety feels current dosing is working well continue sertraline.  Reviewed blood work with him today.  Up-to-date with influenza.    Diabetes Follow-up    How often are you checking your blood sugar? One time daily  What time of day are you checking your blood sugars (select all that apply)?  Before meals  Have you had any blood sugars above 200?  No  Have you had any blood sugars below 70?  No  What symptoms do you notice when your blood sugar is low?  Dizzy  What concerns do you have today about your diabetes? None   Do you have any of these symptoms? (Select all that apply)  No numbness or tingling in feet.  No redness, sores or blisters on feet.  No complaints of excessive thirst.  No reports  of blurry vision.  No significant changes to weight.      BP Readings from Last 2 Encounters:   09/06/23 120/81   03/03/23 103/76     Hemoglobin A1C (%)   Date Value   03/03/2023 5.6   09/02/2022 5.8 (H)     LDL Cholesterol Calculated (mg/dL)   Date Value   03/03/2023 32   03/02/2022 38     LDL Cholesterol Direct (mg/dL)   Date Value   01/15/2014 60             Objective    /81 (BP Location: Left arm, Patient Position: Sitting, Cuff Size: Adult Large)   Pulse 62   Resp 16   Wt 131.5 kg (290 lb)   SpO2 96%   BMI 42.83 kg/m    Body mass index is 42.83 kg/m .  Physical Exam   GENERAL: healthy, alert and no distress  EYES: Eyes grossly normal to inspection, PERRL and conjunctivae and sclerae normal  RESP: lungs clear to auscultation - no rales, rhonchi or wheezes  CV: regular rate and rhythm, normal S1 S2, no S3 or S4, no murmur, click or rub, no peripheral edema and peripheral pulses strong  ABDOMEN: bowel sounds normal  MS: no gross musculoskeletal defects noted, no edema  NEURO: Normal strength and tone, mentation intact and speech normal  PSYCH: mentation appears normal, affect normal/bright

## 2023-09-08 ENCOUNTER — TELEPHONE (OUTPATIENT)
Dept: FAMILY MEDICINE | Facility: CLINIC | Age: 78
End: 2023-09-08
Payer: COMMERCIAL

## 2023-09-08 NOTE — TELEPHONE ENCOUNTER
----- Message from Ba Miguel MD sent at 9/7/2023  8:20 AM CDT -----  Please call and mail results to patient- labs look great.  Urine is normal- showing no protein spilling.  Kidney function and electrolytes are normal- all in goal range.  No concerns on blood work.

## 2023-09-16 DIAGNOSIS — Z79.899 MEDICATION MANAGEMENT: ICD-10-CM

## 2023-09-18 RX ORDER — ALLOPURINOL 300 MG/1
TABLET ORAL
Qty: 90 TABLET | Refills: 0 | Status: SHIPPED | OUTPATIENT
Start: 2023-09-18 | End: 2023-12-14

## 2023-09-29 DIAGNOSIS — E11.9 TYPE 2 DIABETES MELLITUS WITHOUT COMPLICATION, WITHOUT LONG-TERM CURRENT USE OF INSULIN (H): ICD-10-CM

## 2023-09-29 RX ORDER — LANCETS 30 GAUGE
EACH MISCELLANEOUS
Qty: 200 EACH | Refills: 0 | Status: SHIPPED | OUTPATIENT
Start: 2023-09-29 | End: 2024-03-29

## 2023-11-06 DIAGNOSIS — I10 ESSENTIAL HYPERTENSION: ICD-10-CM

## 2023-11-07 RX ORDER — HYDROCHLOROTHIAZIDE 12.5 MG/1
12.5 TABLET ORAL DAILY
Qty: 90 TABLET | Refills: 2 | Status: SHIPPED | OUTPATIENT
Start: 2023-11-07 | End: 2024-08-01

## 2023-11-15 ENCOUNTER — TRANSFERRED RECORDS (OUTPATIENT)
Dept: HEALTH INFORMATION MANAGEMENT | Facility: CLINIC | Age: 78
End: 2023-11-15
Payer: COMMERCIAL

## 2023-11-27 ENCOUNTER — OFFICE VISIT (OUTPATIENT)
Dept: FAMILY MEDICINE | Facility: CLINIC | Age: 78
End: 2023-11-27
Payer: COMMERCIAL

## 2023-11-27 VITALS
TEMPERATURE: 98.9 F | DIASTOLIC BLOOD PRESSURE: 75 MMHG | HEART RATE: 56 BPM | SYSTOLIC BLOOD PRESSURE: 102 MMHG | RESPIRATION RATE: 24 BRPM | OXYGEN SATURATION: 93 % | BODY MASS INDEX: 43.12 KG/M2 | WEIGHT: 292 LBS

## 2023-11-27 DIAGNOSIS — N28.89 NODULE OF KIDNEY: Primary | ICD-10-CM

## 2023-11-27 DIAGNOSIS — I95.1 ORTHOSTATIC HYPOTENSION: ICD-10-CM

## 2023-11-27 DIAGNOSIS — K52.9 COLITIS: ICD-10-CM

## 2023-11-27 PROCEDURE — 99214 OFFICE O/P EST MOD 30 MIN: CPT | Performed by: FAMILY MEDICINE

## 2023-11-27 RX ORDER — CEFUROXIME AXETIL 500 MG/1
500 TABLET ORAL 2 TIMES DAILY
COMMUNITY
Start: 2023-11-25 | End: 2023-12-05

## 2023-11-27 RX ORDER — METRONIDAZOLE 500 MG/1
500 TABLET ORAL 3 TIMES DAILY
COMMUNITY
Start: 2023-11-25 | End: 2023-12-05

## 2023-11-27 NOTE — PATIENT INSTRUCTIONS
Keep well hydrated with water   Hold lisionpril until you are done with the two anti-biotics  Start back on lisinopril once antibiotics are done- unless you don't feel- contact Dr. Miguel

## 2023-11-27 NOTE — PROGRESS NOTES
"  Assessment & Plan     Nodule of kidney  Reviewed imaging from urgent care  Nodule seen on left kidney and review was also noted on imaging earlier in the year  Looks to be more solidified and concerning for possible neoplasm  Radiology recommended MRI to further evaluate which we will order form today and follow-up based on MRI results  - MR Renal without Contrast; Future    Orthostatic hypotension  Blood pressure on the low end of normal in clinic  Force patient to fall walking into clinic tripped on a mat  No injuries were sustained  Has been feeling dizzy the last few days since on the new antibiotics for colitis  Discussed with him holding his lisinopril until antibiotics are finished  Discussed keeping well-hydrated  Contact me if symptoms or not improving    Colitis  Patient diagnosed with colitis currently on dual antibiotic therapy reviewed imaging and labs with him he had an elevated white blood cell count  Feeling lightheaded and unsteady last couple days likely due to drop in blood pressure  Holding his blood pressure lisinopril medication and discussing elevation of hydration  We will obtain imaging with MRI is suggested  If symptoms are worsening of colitis or dizziness to be seen in person       BMI:   Estimated body mass index is 43.12 kg/m  as calculated from the following:    Height as of 3/3/23: 1.753 m (5' 9\").    Weight as of this encounter: 132.5 kg (292 lb).     Ba Miguel MD  [facility]    Mario Mayfield is a 78 year old, presenting for the following health issues:  Follow Up (Was seen at the [facility] 11/25/23 for abdominal pain - CT scan showing Colitis, discuss results of the CT scan) and Fall (Fell while walking into clinic, landed on right knee and shoulder, denies any pain )        11/27/2023     3:57 PM   Additional Questions   Roomed by Loida BEVERLY     Patient here for emergency room follow-up  Seen with abdominal pain and " diagnosed with colitis of the cecum ascending and transverse colon.  Started on dual antibiotic therapy-on imaging however left nodule of the kidney was noted which was also noted on imaging and reviewed now earlier this year when his gallbladder was taken out.  Appearance appears more solidified and recommendation for MRI was made.  Replacements ordered for today.  He has been lightheaded and dizzy since starting the antibiotics we discussed likely is causing a drop in blood pressure with the infection-I will have him stop his lisinopril at this time and keep well-hydrated to try to improve the symptoms of orthostatic hypotension.  Patient sustained fall tripping over either the threshold with a rug coming into clinic today if this was unwitnessed.  He was helped to a wheelchair sustaining no injury.  He is able to stand on his own but was assisted out of building with a wheelchair as well.  Instructed to contact us if any worsening symptoms of the orthostatic hypotension or colitis.  We will follow-up based on MRI results.  ED/UC Followup:    Facility:  [facility]  Date of visit:  11/25/23  Reason for visit:  Abdominal pain   Current Status: Slightly improved        Review of Systems         Objective    There were no vitals taken for this visit.  There is no height or weight on file to calculate BMI.  Physical Exam   GENERAL: healthy, alert and no distress  GENERAL: Appears slightly unsteady on his feet due to lightheadedness  RESP: lungs clear to auscultation - no rales, rhonchi or wheezes  CV: regular rate and rhythm, normal S1 S2, no S3 or S4, no murmur, click or rub, no peripheral edema and peripheral pulses strong  Abdomen: Normal bowel sounds still some tenderness per patient  MS: no gross musculoskeletal defects noted, no edema  NEURO: Normal strength and tone, mentation intact and speech normal  PSYCH: mentation appears normal, affect normal/bright

## 2023-12-14 DIAGNOSIS — Z79.899 MEDICATION MANAGEMENT: ICD-10-CM

## 2023-12-14 RX ORDER — ALLOPURINOL 300 MG/1
TABLET ORAL
Qty: 90 TABLET | Refills: 0 | Status: SHIPPED | OUTPATIENT
Start: 2023-12-14 | End: 2024-03-11

## 2023-12-21 ENCOUNTER — HOSPITAL ENCOUNTER (OUTPATIENT)
Dept: MRI IMAGING | Facility: HOSPITAL | Age: 78
Discharge: HOME | End: 2023-12-21
Attending: FAMILY MEDICINE | Admitting: FAMILY MEDICINE
Payer: COMMERCIAL

## 2023-12-21 DIAGNOSIS — N28.89 NODULE OF KIDNEY: ICD-10-CM

## 2023-12-21 PROCEDURE — 74181 MRI ABDOMEN W/O CONTRAST: CPT

## 2023-12-28 ENCOUNTER — TELEPHONE (OUTPATIENT)
Dept: FAMILY MEDICINE | Facility: CLINIC | Age: 78
End: 2023-12-28
Payer: COMMERCIAL

## 2023-12-28 NOTE — TELEPHONE ENCOUNTER
Called patient and relayed information/instructions from provider. Patient has no further questions at this time and will follow up as needed/instructed.    Yoshi Tyler RN     [facility]----- Message from Ba Miguel MD sent at 12/28/2023 10:49 AM CST -----  Please inform patient that MRI showing suspected benign kidney lesion- recommend re-imaging in 6 months.

## 2024-02-03 DIAGNOSIS — E78.2 MIXED HYPERLIPIDEMIA: ICD-10-CM

## 2024-02-05 RX ORDER — ATORVASTATIN CALCIUM 10 MG/1
TABLET, FILM COATED ORAL
Qty: 90 TABLET | Refills: 2 | Status: SHIPPED | OUTPATIENT
Start: 2024-02-05

## 2024-03-06 ENCOUNTER — OFFICE VISIT (OUTPATIENT)
Dept: FAMILY MEDICINE | Facility: CLINIC | Age: 79
End: 2024-03-06
Payer: COMMERCIAL

## 2024-03-06 VITALS
RESPIRATION RATE: 20 BRPM | OXYGEN SATURATION: 95 % | TEMPERATURE: 98.2 F | DIASTOLIC BLOOD PRESSURE: 84 MMHG | WEIGHT: 297 LBS | HEART RATE: 69 BPM | SYSTOLIC BLOOD PRESSURE: 133 MMHG | BODY MASS INDEX: 43.99 KG/M2 | HEIGHT: 69 IN

## 2024-03-06 DIAGNOSIS — E66.813 CLASS 3 SEVERE OBESITY DUE TO EXCESS CALORIES WITH SERIOUS COMORBIDITY AND BODY MASS INDEX (BMI) OF 40.0 TO 44.9 IN ADULT (H): ICD-10-CM

## 2024-03-06 DIAGNOSIS — E11.9 TYPE 2 DIABETES MELLITUS WITHOUT COMPLICATION, WITHOUT LONG-TERM CURRENT USE OF INSULIN (H): Primary | ICD-10-CM

## 2024-03-06 DIAGNOSIS — E78.2 MIXED HYPERLIPIDEMIA: ICD-10-CM

## 2024-03-06 DIAGNOSIS — E66.01 CLASS 3 SEVERE OBESITY DUE TO EXCESS CALORIES WITH SERIOUS COMORBIDITY AND BODY MASS INDEX (BMI) OF 40.0 TO 44.9 IN ADULT (H): ICD-10-CM

## 2024-03-06 DIAGNOSIS — I10 PRIMARY HYPERTENSION: ICD-10-CM

## 2024-03-06 LAB
HBA1C MFR BLD: 5.4 % (ref 0–5.6)
HOLD SPECIMEN: NORMAL
HOLD SPECIMEN: NORMAL

## 2024-03-06 PROCEDURE — 99214 OFFICE O/P EST MOD 30 MIN: CPT | Performed by: FAMILY MEDICINE

## 2024-03-06 PROCEDURE — 80048 BASIC METABOLIC PNL TOTAL CA: CPT | Performed by: FAMILY MEDICINE

## 2024-03-06 PROCEDURE — 80061 LIPID PANEL: CPT | Performed by: FAMILY MEDICINE

## 2024-03-06 PROCEDURE — 83036 HEMOGLOBIN GLYCOSYLATED A1C: CPT | Performed by: FAMILY MEDICINE

## 2024-03-06 PROCEDURE — 36415 COLL VENOUS BLD VENIPUNCTURE: CPT | Performed by: FAMILY MEDICINE

## 2024-03-06 NOTE — PROGRESS NOTES
"  Assessment & Plan     Type 2 diabetes mellitus without complication, without long-term current use of insulin (H)  Hemoglobin A1c at goal range  Continue with metformin.  Follow-up in 6 months  Continue to eat healthy  - HEMOGLOBIN A1C  - Lipid panel reflex to direct LDL Fasting  - HEMOGLOBIN A1C  - Basic metabolic panel  (Ca, Cl, CO2, Creat, Gluc, K, Na, BUN)  - Lipid panel reflex to direct LDL Fasting  - Basic metabolic panel  (Ca, Cl, CO2, Creat, Gluc, K, Na, BUN)    Mixed hyperlipidemia  Patient is on statin therapy we will check lipid levels today and follow-up based on results    Primary hypertension  Blood pressure at goal range continue current medications    Class 3 severe obesity due to excess calories with serious comorbidity and body mass index (BMI) of 40.0 to 44.9 in adult (H)  Discussed diet and exercise              BMI  Estimated body mass index is 43.86 kg/m  as calculated from the following:    Height as of this encounter: 1.753 m (5' 9\").    Weight as of this encounter: 134.7 kg (297 lb).             Mario Mayfield is a 78 year old, presenting for the following health issues:  Diabetes (Medication check, fasting labs )  Patient is here for medication check and diabetes follow-up.  A1c returns below 6% patient currently is taking 1000 mg metformin and is stable with no side effects we will continue current dosing and follow-up at 6-month intervals.  He is on antihypertensive medication blood pressure upper limits of normal continue with current dosing.  He is on statin therapy and we will check lipid levels today.  Patient BMI greater than 40 we discussed diet and exercise.    To note patient has a kidney cyst that appeared benign but recommended follow-up later this year we will order MRI at his next visit in early fall to assess stability.      3/6/2024     8:38 AM   Additional Questions   Roomed by Loida CARDONA CMA     History of Present Illness       Diabetes:   He presents for follow up of " "diabetes.  He is checking home blood glucose one time daily.   He checks blood glucose before meals.  Blood glucose is never over 200 and never under 70. He is aware of hypoglycemia symptoms including none.   He is concerned about none and other. He has no concerns regarding his diabetes at this time.   He is not experiencing numbness or burning in feet, excessive thirst, blurry vision, weight changes or redness, sores or blisters on feet.           He eats 0-1 servings of fruits and vegetables daily.He exercises with enough effort to increase his heart rate 9 or less minutes per day.  He exercises with enough effort to increase his heart rate 3 or less days per week.   He is taking medications regularly.             Objective    /84 (BP Location: Left arm, Patient Position: Sitting, Cuff Size: Adult Large)   Pulse 69   Temp 98.2  F (36.8  C) (Oral)   Resp 20   Ht 1.753 m (5' 9\")   Wt 134.7 kg (297 lb)   SpO2 95%   BMI 43.86 kg/m    Body mass index is 43.86 kg/m .  Physical Exam   GENERAL: alert and no distress  EYES: Eyes grossly normal to inspection, PERRL and conjunctivae and sclerae normal  RESP: lungs clear to auscultation - no rales, rhonchi or wheezes  CV: regular rate and rhythm, normal S1 S2, no S3 or S4, no murmur, click or rub, no peripheral edema  MS: no gross musculoskeletal defects noted, no edema  NEURO: Normal strength and tone, mentation intact and speech normal  PSYCH: mentation appears normal, affect normal/bright            Signed Electronically by: Ba Miguel MD    "

## 2024-03-06 NOTE — LETTER
March 11, 2024      [address] Guillen  [address] [id number]        Dear ,    We are writing to inform you of your test results.    Your kidney function is normal.  Cholesterol levels are at goal range.  Hemoglobin A1c at goal range at 5.4%.  No concerns on blood work.     Resulted Orders   HEMOGLOBIN A1C   Result Value Ref Range    Hemoglobin A1C 5.4 0.0 - 5.6 %      Comment:      Normal <5.7%   Prediabetes 5.7-6.4%    Diabetes 6.5% or higher     Note: Adopted from ADA consensus guidelines.   Lipid panel reflex to direct LDL Fasting   Result Value Ref Range    Cholesterol 107 <200 mg/dL    Triglycerides 58 <150 mg/dL    Direct Measure HDL 50 >=40 mg/dL    LDL Cholesterol Calculated 45 <=100 mg/dL    Non HDL Cholesterol 57 <130 mg/dL    Narrative    Cholesterol  Desirable:  <200 mg/dL    Triglycerides  Normal:  Less than 150 mg/dL  Borderline High:  150-199 mg/dL  High:  200-499 mg/dL  Very High:  Greater than or equal to 500 mg/dL    Direct Measure HDL  Female:  Greater than or equal to 50 mg/dL   Male:  Greater than or equal to 40 mg/dL    LDL Cholesterol  Desirable:  <100mg/dL  Above Desirable:  100-129 mg/dL   Borderline High:  130-159 mg/dL   High:  160-189 mg/dL   Very High:  >= 190 mg/dL    Non HDL Cholesterol  Desirable:  130 mg/dL  Above Desirable:  130-159 mg/dL  Borderline High:  160-189 mg/dL  High:  190-219 mg/dL  Very High:  Greater than or equal to 220 mg/dL   Basic metabolic panel  (Ca, Cl, CO2, Creat, Gluc, K, Na, BUN)   Result Value Ref Range    Sodium 137 135 - 145 mmol/L      Comment:      Reference intervals for this test were updated on 09/26/2023 to more accurately reflect our healthy population. There may be differences in the flagging of prior results with similar values performed with this method. Interpretation of those prior results can be made in the context of the updated reference intervals.     Potassium 4.1 3.4 - 5.3 mmol/L    Chloride 99 98 -  107 mmol/L    Carbon Dioxide (CO2) 27 22 - 29 mmol/L    Anion Gap 11 7 - 15 mmol/L    Urea Nitrogen 22.5 8.0 - 23.0 mg/dL    Creatinine 1.11 0.67 - 1.17 mg/dL    GFR Estimate 68 >60 mL/min/1.73m2    Calcium 9.0 8.8 - 10.2 mg/dL    Glucose 101 (H) 70 - 99 mg/dL       If you have any questions or concerns, please call the clinic at the number listed above.       Sincerely,      Ba Miguel MD

## 2024-03-07 LAB
ANION GAP SERPL CALCULATED.3IONS-SCNC: 11 MMOL/L (ref 7–15)
BUN SERPL-MCNC: 22.5 MG/DL (ref 8–23)
CALCIUM SERPL-MCNC: 9 MG/DL (ref 8.8–10.2)
CHLORIDE SERPL-SCNC: 99 MMOL/L (ref 98–107)
CHOLEST SERPL-MCNC: 107 MG/DL
CREAT SERPL-MCNC: 1.11 MG/DL (ref 0.67–1.17)
DEPRECATED HCO3 PLAS-SCNC: 27 MMOL/L (ref 22–29)
EGFRCR SERPLBLD CKD-EPI 2021: 68 ML/MIN/1.73M2
GLUCOSE SERPL-MCNC: 101 MG/DL (ref 70–99)
HDLC SERPL-MCNC: 50 MG/DL
LDLC SERPL CALC-MCNC: 45 MG/DL
NONHDLC SERPL-MCNC: 57 MG/DL
POTASSIUM SERPL-SCNC: 4.1 MMOL/L (ref 3.4–5.3)
SODIUM SERPL-SCNC: 137 MMOL/L (ref 135–145)
TRIGL SERPL-MCNC: 58 MG/DL

## 2024-03-11 DIAGNOSIS — Z79.899 MEDICATION MANAGEMENT: ICD-10-CM

## 2024-03-11 RX ORDER — ALLOPURINOL 300 MG/1
TABLET ORAL
Qty: 90 TABLET | Refills: 0 | Status: SHIPPED | OUTPATIENT
Start: 2024-03-11 | End: 2024-07-16

## 2024-03-29 DIAGNOSIS — E11.9 TYPE 2 DIABETES MELLITUS WITHOUT COMPLICATION, WITHOUT LONG-TERM CURRENT USE OF INSULIN (H): ICD-10-CM

## 2024-03-29 RX ORDER — BLOOD SUGAR DIAGNOSTIC
STRIP MISCELLANEOUS
Qty: 200 STRIP | Refills: 0 | Status: SHIPPED | OUTPATIENT
Start: 2024-03-29 | End: 2024-09-30

## 2024-03-29 RX ORDER — LANCETS 30 GAUGE
EACH MISCELLANEOUS
Qty: 200 EACH | Refills: 0 | Status: SHIPPED | OUTPATIENT
Start: 2024-03-29 | End: 2024-10-02

## 2024-05-04 DIAGNOSIS — I10 HTN (HYPERTENSION): ICD-10-CM

## 2024-05-06 RX ORDER — LISINOPRIL 10 MG/1
10 TABLET ORAL DAILY
Qty: 90 TABLET | Refills: 2 | Status: SHIPPED | OUTPATIENT
Start: 2024-05-06

## 2024-07-16 DIAGNOSIS — Z79.899 MEDICATION MANAGEMENT: ICD-10-CM

## 2024-07-16 RX ORDER — ALLOPURINOL 300 MG/1
TABLET ORAL
Qty: 90 TABLET | Refills: 0 | Status: SHIPPED | OUTPATIENT
Start: 2024-07-16

## 2024-08-01 DIAGNOSIS — I10 ESSENTIAL HYPERTENSION: ICD-10-CM

## 2024-08-01 RX ORDER — HYDROCHLOROTHIAZIDE 12.5 MG/1
TABLET ORAL
Qty: 90 TABLET | Refills: 1 | Status: SHIPPED | OUTPATIENT
Start: 2024-08-01

## 2024-08-13 ENCOUNTER — PATIENT OUTREACH (OUTPATIENT)
Dept: CARE COORDINATION | Facility: CLINIC | Age: 79
End: 2024-08-13
Payer: COMMERCIAL

## 2024-09-06 ENCOUNTER — OFFICE VISIT (OUTPATIENT)
Dept: FAMILY MEDICINE | Facility: CLINIC | Age: 79
End: 2024-09-06
Payer: COMMERCIAL

## 2024-09-06 VITALS
HEART RATE: 71 BPM | OXYGEN SATURATION: 93 % | TEMPERATURE: 97.6 F | DIASTOLIC BLOOD PRESSURE: 82 MMHG | RESPIRATION RATE: 20 BRPM | BODY MASS INDEX: 44.15 KG/M2 | SYSTOLIC BLOOD PRESSURE: 120 MMHG | WEIGHT: 299 LBS

## 2024-09-06 DIAGNOSIS — F41.9 ANXIETY: ICD-10-CM

## 2024-09-06 DIAGNOSIS — E11.9 TYPE 2 DIABETES MELLITUS WITHOUT COMPLICATION, WITHOUT LONG-TERM CURRENT USE OF INSULIN (H): Primary | ICD-10-CM

## 2024-09-06 DIAGNOSIS — R60.9 EDEMA, UNSPECIFIED TYPE: ICD-10-CM

## 2024-09-06 DIAGNOSIS — I10 PRIMARY HYPERTENSION: ICD-10-CM

## 2024-09-06 LAB
CREAT UR-MCNC: 104 MG/DL
HBA1C MFR BLD: 5.5 % (ref 0–5.6)
HOLD SPECIMEN: NORMAL
HOLD SPECIMEN: NORMAL
MICROALBUMIN UR-MCNC: <12 MG/L
MICROALBUMIN/CREAT UR: NORMAL MG/G{CREAT}

## 2024-09-06 PROCEDURE — 36415 COLL VENOUS BLD VENIPUNCTURE: CPT | Performed by: FAMILY MEDICINE

## 2024-09-06 PROCEDURE — 82043 UR ALBUMIN QUANTITATIVE: CPT | Performed by: FAMILY MEDICINE

## 2024-09-06 PROCEDURE — 99214 OFFICE O/P EST MOD 30 MIN: CPT | Performed by: FAMILY MEDICINE

## 2024-09-06 PROCEDURE — G2211 COMPLEX E/M VISIT ADD ON: HCPCS | Performed by: FAMILY MEDICINE

## 2024-09-06 PROCEDURE — 82570 ASSAY OF URINE CREATININE: CPT | Performed by: FAMILY MEDICINE

## 2024-09-06 PROCEDURE — 83036 HEMOGLOBIN GLYCOSYLATED A1C: CPT | Performed by: FAMILY MEDICINE

## 2024-09-06 RX ORDER — FUROSEMIDE 20 MG
TABLET ORAL
Qty: 90 TABLET | Refills: 1 | Status: SHIPPED | OUTPATIENT
Start: 2024-09-06

## 2024-09-06 RX ORDER — SERTRALINE HYDROCHLORIDE 25 MG/1
TABLET, FILM COATED ORAL
Qty: 90 TABLET | Refills: 3 | Status: SHIPPED | OUTPATIENT
Start: 2024-09-06

## 2024-09-06 NOTE — PROGRESS NOTES
"  Assessment & Plan     Type 2 diabetes mellitus without complication, without long-term current use of insulin (H)  A1c remains at goal range  Continue with metformin  Continue work on diet  Recommend follow-up in 6 months  - HEMOGLOBIN A1C  - Albumin Random Urine Quantitative with Creat Ratio  - HEMOGLOBIN A1C  - Albumin Random Urine Quantitative with Creat Ratio    Anxiety  Patient feels current dosing of sertraline working well continue with refills as needed    Primary hypertension  Blood pressure at goal range today continue with current antihypertensives  Kidney function electrolytes checked earlier this year at goal range            BMI  Estimated body mass index is 44.15 kg/m  as calculated from the following:    Height as of 3/6/24: 1.753 m (5' 9\").    Weight as of this encounter: 135.6 kg (299 lb).   Weight management plan: Discussed healthy diet and exercise guidelines          Mario Mayfield is a 79 year old, presenting for the following health issues:  Diabetes (Medication check, fasting )        9/6/2024     8:53 AM   Additional Questions   Roomed by Loida CARDONA CMA         9/6/2024   Forms   Any forms needing to be completed Yes        History of Present Illness       Reason for visit:  Diabetic check   He is taking medications regularly.     Patient is here for medication check  Patient is a diabetic currently on metformin A1c returns at goal range we will continue with metformin at this time.  Patient still could be making some better dietary choices to help with weight loss we discussed diet and exercise.  Blood pressure is at goal range earlier this year electrolytes and kidney function checked at goal we will continue with current antihypertensives.    Patient on sertraline feels current dosing is working well to help with his anxiety we will continue with current dosing.  Patient is eating out a lot and we discussed some dietary changes to help with weight loss.                Objective    BP " 120/82 (BP Location: Left arm, Patient Position: Sitting, Cuff Size: Adult Large)   Pulse 71   Temp 97.6  F (36.4  C) (Oral)   Resp 20   Wt 135.6 kg (299 lb)   SpO2 93%   BMI 44.15 kg/m    Body mass index is 44.15 kg/m .  Physical Exam   GENERAL: alert and no distress  EYES: Eyes grossly normal to inspection, PERRL and conjunctivae and sclerae normal  RESP: lungs clear to auscultation - no rales, rhonchi or wheezes  CV: regular rate and rhythm, normal S1 S2, no S3 or S4, no murmur, click or rub, no peripheral edema  MS: no gross musculoskeletal defects noted, no edema  NEURO: Normal strength and tone, mentation intact and speech normal  PSYCH: mentation appears normal, affect normal/bright            Signed Electronically by: Ba Miguel MD

## 2024-09-30 ENCOUNTER — TELEPHONE (OUTPATIENT)
Dept: FAMILY MEDICINE | Facility: CLINIC | Age: 79
End: 2024-09-30
Payer: COMMERCIAL

## 2024-09-30 DIAGNOSIS — E11.9 TYPE 2 DIABETES MELLITUS WITHOUT COMPLICATION, WITHOUT LONG-TERM CURRENT USE OF INSULIN (H): ICD-10-CM

## 2024-09-30 RX ORDER — BLOOD SUGAR DIAGNOSTIC
STRIP MISCELLANEOUS
Qty: 200 STRIP | Refills: 2 | Status: SHIPPED | OUTPATIENT
Start: 2024-09-30

## 2024-09-30 NOTE — TELEPHONE ENCOUNTER
Forms/Letter Request    Type of form/letter: OTHER: [employer] form        Do we have the form/letter: Yes: 9/30/24    Who is the form from? Patient    Where did/will the form come from? Patient or family brought in       When is form/letter needed by: When you can get it done    How would you like the form/letter returned: Mail  Is this the correct address?: No -Mail in the self addressed stamped enveloped with the form letter  Patient Notified form requests are processed in 5-7 business days:Yes    Okay to leave a detailed message?: Yes at Cell number on file:    Telephone Information:   Mobile [phone]     Patient said to just mail the form out when you get it signed. He included the self addressed stamped envelope to send it in.  Put in the office mail for [facility] team today

## 2024-10-02 DIAGNOSIS — E11.9 TYPE 2 DIABETES MELLITUS WITHOUT COMPLICATION, WITHOUT LONG-TERM CURRENT USE OF INSULIN (H): ICD-10-CM

## 2024-10-02 RX ORDER — LANCETS 30 GAUGE
EACH MISCELLANEOUS
Qty: 200 EACH | Refills: 0 | Status: SHIPPED | OUTPATIENT
Start: 2024-10-02

## 2024-10-08 DIAGNOSIS — Z79.899 MEDICATION MANAGEMENT: ICD-10-CM

## 2024-10-08 RX ORDER — ALLOPURINOL 300 MG/1
TABLET ORAL
Qty: 90 TABLET | Refills: 0 | Status: SHIPPED | OUTPATIENT
Start: 2024-10-08

## 2024-10-09 DIAGNOSIS — E11.9 TYPE 2 DIABETES MELLITUS WITHOUT COMPLICATION, WITHOUT LONG-TERM CURRENT USE OF INSULIN (H): ICD-10-CM

## 2024-10-10 RX ORDER — METFORMIN HYDROCHLORIDE 500 MG/1
1000 TABLET, EXTENDED RELEASE ORAL
Qty: 180 TABLET | Refills: 0 | Status: SHIPPED | OUTPATIENT
Start: 2024-10-10

## 2024-11-02 DIAGNOSIS — E78.2 MIXED HYPERLIPIDEMIA: ICD-10-CM

## 2024-11-04 RX ORDER — ATORVASTATIN CALCIUM 10 MG/1
10 TABLET, FILM COATED ORAL DAILY
Qty: 90 TABLET | Refills: 0 | Status: SHIPPED | OUTPATIENT
Start: 2024-11-04

## 2025-01-04 DIAGNOSIS — E11.9 TYPE 2 DIABETES MELLITUS WITHOUT COMPLICATION, WITHOUT LONG-TERM CURRENT USE OF INSULIN (H): ICD-10-CM

## 2025-01-04 DIAGNOSIS — Z79.899 MEDICATION MANAGEMENT: ICD-10-CM

## 2025-01-06 RX ORDER — ALLOPURINOL 300 MG/1
TABLET ORAL
Qty: 90 TABLET | Refills: 0 | Status: SHIPPED | OUTPATIENT
Start: 2025-01-06

## 2025-01-06 RX ORDER — METFORMIN HYDROCHLORIDE 500 MG/1
TABLET, EXTENDED RELEASE ORAL
Qty: 180 TABLET | Refills: 0 | Status: SHIPPED | OUTPATIENT
Start: 2025-01-06

## 2025-01-14 DIAGNOSIS — E11.9 TYPE 2 DIABETES MELLITUS WITHOUT COMPLICATION, WITHOUT LONG-TERM CURRENT USE OF INSULIN (H): ICD-10-CM

## 2025-01-14 RX ORDER — LANCETS 30 GAUGE
EACH MISCELLANEOUS
Qty: 200 EACH | Refills: 0 | Status: SHIPPED | OUTPATIENT
Start: 2025-01-14

## 2025-03-10 ENCOUNTER — OFFICE VISIT (OUTPATIENT)
Dept: FAMILY MEDICINE | Facility: CLINIC | Age: 80
End: 2025-03-10
Payer: COMMERCIAL

## 2025-03-10 VITALS
WEIGHT: 293 LBS | DIASTOLIC BLOOD PRESSURE: 73 MMHG | OXYGEN SATURATION: 94 % | HEART RATE: 85 BPM | TEMPERATURE: 97.7 F | SYSTOLIC BLOOD PRESSURE: 139 MMHG | RESPIRATION RATE: 16 BRPM | HEIGHT: 69 IN | BODY MASS INDEX: 43.4 KG/M2

## 2025-03-10 DIAGNOSIS — E11.9 TYPE 2 DIABETES MELLITUS WITHOUT COMPLICATION, WITHOUT LONG-TERM CURRENT USE OF INSULIN (H): ICD-10-CM

## 2025-03-10 DIAGNOSIS — I10 ESSENTIAL HYPERTENSION: Primary | ICD-10-CM

## 2025-03-10 DIAGNOSIS — E66.01 CLASS 3 SEVERE OBESITY DUE TO EXCESS CALORIES WITH SERIOUS COMORBIDITY AND BODY MASS INDEX (BMI) OF 40.0 TO 44.9 IN ADULT (H): ICD-10-CM

## 2025-03-10 DIAGNOSIS — R26.89 BALANCE PROBLEMS: ICD-10-CM

## 2025-03-10 DIAGNOSIS — Z00.00 HEALTH CARE MAINTENANCE: ICD-10-CM

## 2025-03-10 DIAGNOSIS — E66.813 CLASS 3 SEVERE OBESITY DUE TO EXCESS CALORIES WITH SERIOUS COMORBIDITY AND BODY MASS INDEX (BMI) OF 40.0 TO 44.9 IN ADULT (H): ICD-10-CM

## 2025-03-10 DIAGNOSIS — E78.2 MIXED HYPERLIPIDEMIA: ICD-10-CM

## 2025-03-10 LAB
ALBUMIN SERPL BCG-MCNC: 4.2 G/DL (ref 3.5–5.2)
ALP SERPL-CCNC: 57 U/L (ref 40–150)
ALT SERPL W P-5'-P-CCNC: 25 U/L (ref 0–70)
ANION GAP SERPL CALCULATED.3IONS-SCNC: 13 MMOL/L (ref 7–15)
AST SERPL W P-5'-P-CCNC: 20 U/L (ref 0–45)
BILIRUB SERPL-MCNC: 1 MG/DL
BUN SERPL-MCNC: 27.5 MG/DL (ref 8–23)
CALCIUM SERPL-MCNC: 9.5 MG/DL (ref 8.8–10.4)
CHLORIDE SERPL-SCNC: 99 MMOL/L (ref 98–107)
CHOLEST SERPL-MCNC: 109 MG/DL
CREAT SERPL-MCNC: 1.2 MG/DL (ref 0.67–1.17)
EGFRCR SERPLBLD CKD-EPI 2021: 62 ML/MIN/1.73M2
ERYTHROCYTE [DISTWIDTH] IN BLOOD BY AUTOMATED COUNT: 14.2 % (ref 10–15)
EST. AVERAGE GLUCOSE BLD GHB EST-MCNC: 111 MG/DL
FASTING STATUS PATIENT QL REPORTED: YES
FASTING STATUS PATIENT QL REPORTED: YES
GLUCOSE SERPL-MCNC: 104 MG/DL (ref 70–99)
HBA1C MFR BLD: 5.5 % (ref 0–5.6)
HCO3 SERPL-SCNC: 27 MMOL/L (ref 22–29)
HCT VFR BLD AUTO: 46.9 % (ref 40–53)
HDLC SERPL-MCNC: 45 MG/DL
HGB BLD-MCNC: 15.7 G/DL (ref 13.3–17.7)
LDLC SERPL CALC-MCNC: 40 MG/DL
MCH RBC QN AUTO: 29.5 PG (ref 26.5–33)
MCHC RBC AUTO-ENTMCNC: 33.5 G/DL (ref 31.5–36.5)
MCV RBC AUTO: 88 FL (ref 78–100)
NONHDLC SERPL-MCNC: 64 MG/DL
PLATELET # BLD AUTO: 267 10E3/UL (ref 150–450)
POTASSIUM SERPL-SCNC: 4 MMOL/L (ref 3.4–5.3)
PROT SERPL-MCNC: 7.6 G/DL (ref 6.4–8.3)
RBC # BLD AUTO: 5.33 10E6/UL (ref 4.4–5.9)
SODIUM SERPL-SCNC: 139 MMOL/L (ref 135–145)
TRIGL SERPL-MCNC: 119 MG/DL
WBC # BLD AUTO: 9.5 10E3/UL (ref 4–11)

## 2025-03-10 PROCEDURE — 36415 COLL VENOUS BLD VENIPUNCTURE: CPT | Performed by: FAMILY MEDICINE

## 2025-03-10 PROCEDURE — G2211 COMPLEX E/M VISIT ADD ON: HCPCS | Performed by: FAMILY MEDICINE

## 2025-03-10 PROCEDURE — 3075F SYST BP GE 130 - 139MM HG: CPT | Performed by: FAMILY MEDICINE

## 2025-03-10 PROCEDURE — 85027 COMPLETE CBC AUTOMATED: CPT | Performed by: FAMILY MEDICINE

## 2025-03-10 PROCEDURE — 80061 LIPID PANEL: CPT | Performed by: FAMILY MEDICINE

## 2025-03-10 PROCEDURE — 83036 HEMOGLOBIN GLYCOSYLATED A1C: CPT | Performed by: FAMILY MEDICINE

## 2025-03-10 PROCEDURE — 3078F DIAST BP <80 MM HG: CPT | Performed by: FAMILY MEDICINE

## 2025-03-10 PROCEDURE — 99214 OFFICE O/P EST MOD 30 MIN: CPT | Performed by: FAMILY MEDICINE

## 2025-03-10 PROCEDURE — 80053 COMPREHEN METABOLIC PANEL: CPT | Performed by: FAMILY MEDICINE

## 2025-03-10 NOTE — PROGRESS NOTES
"  Assessment & Plan     Type 2 diabetes mellitus without complication, without long-term current use of insulin (H)  Obtain blood work today  Continue with metformin  Continue work on healthy eating and regular exercise  - HEMOGLOBIN A1C  - Lipid panel reflex to direct LDL Fasting  - HEMOGLOBIN A1C  - Lipid panel reflex to direct LDL Fasting    Essential hypertension  Blood pressure upper limits of normal check kidney function electrolytes    Health care maintenance  Patient fasting interested in fasting blood work  - CBC with platelets  - Comprehensive metabolic panel (BMP + Alb, Alk Phos, ALT, AST, Total. Bili, TP)  - CBC with platelets  - Comprehensive metabolic panel (BMP + Alb, Alk Phos, ALT, AST, Total. Bili, TP)    Class 3 severe obesity due to excess calories with serious comorbidity and body mass index (BMI) of 40.0 to 44.9 in adult (H)  Discussed diet and exercise    Mixed hyperlipidemia  Patient on statin therapy check lipid levels    Balance problems  Patient was some increased balance problems discussed importance of regular physical activity  Discussed using a walking device in the form of a cane  Handicap form filled out for him today            BMI  Estimated body mass index is 43.27 kg/m  as calculated from the following:    Height as of this encounter: 1.753 m (5' 9\").    Weight as of this encounter: 132.9 kg (293 lb).   Weight management plan: Discussed healthy diet and exercise guidelines          Mario Mayfield is a 79 year old, presenting for the following health issues:  Recheck Medication        3/10/2025     9:08 AM   Additional Questions   Roomed by Dulce BEVERLY    Patient here for medication check.  Patient is a type II diabetic and has been well-controlled the last few years on metformin alone.  He continues to try to be active and is down another few pounds in the last 6 months.  Discussed diet and exercise.  Patient continues to have some dizziness at times and some balance " "issues she unfortunately actually stated that he just tripped in the parking lot getting out of his car got his foot caught and caught on the curb he was helped up by another patient and he is declining any kind of injuries.  Blood pressure is upper limits of normal for his age we will continue with current medications check electrolytes and kidney function.  Patient on statin therapy and we will be checking labs today.  Discussed the importance of regular physical activity.  Patient requesting handicap sticker due to his increasing problems with balance.                Objective    /73   Pulse 85   Temp 97.7  F (36.5  C) (Oral)   Resp 16   Ht 1.753 m (5' 9\")   Wt 132.9 kg (293 lb)   SpO2 94%   BMI 43.27 kg/m    Body mass index is 43.27 kg/m .  Physical Exam   GENERAL: alert and no distress  EYES: Eyes grossly normal to inspection, PERRL and conjunctivae and sclerae normal  NECK: no adenopathy, no asymmetry, masses, or scars  RESP: lungs clear to auscultation - no rales, rhonchi or wheezes  CV: regular rate and rhythm, normal S1 S2, no S3 or S4, no murmur, click or rub, no peripheral edema  MS: no gross musculoskeletal defects noted, no edema  PSYCH: mentation appears normal, affect normal/bright          The longitudinal plan of care for the diagnosis(es)/condition(s) as documented were addressed during this visit. Due to the added complexity in care, I will continue to support [facility] in the subsequent management and with ongoing continuity of care.  Signed Electronically by: aB Miguel MD    "

## 2025-03-13 ENCOUNTER — TELEPHONE (OUTPATIENT)
Dept: FAMILY MEDICINE | Facility: CLINIC | Age: 80
End: 2025-03-13
Payer: COMMERCIAL

## 2025-03-13 NOTE — TELEPHONE ENCOUNTER
----- Message from Ba Miguel sent at 3/12/2025  8:22 PM CDT -----  Please inform patient via call- kidney function slightly decreased- continue to keep well hydrated with water and we will monitor.  Liver function is normal, cholesterol levels are in goal range.  No other concerns on blood work.

## 2025-03-13 NOTE — TELEPHONE ENCOUNTER
Informed patient of message below. Patient had no questions or concerns at this time.   Letter mailed.

## 2025-03-13 NOTE — LETTER
March 13, 2025      [facility] Guillen  [address] [id number]        Dear ,    We are writing to inform you of your test results.    kidney function slightly decreased- continue to keep well hydrated with water and we will monitor.   Liver function is normal, cholesterol levels are in goal range.   No other concerns on blood work.     Resulted Orders   HEMOGLOBIN A1C   Result Value Ref Range    Estimated Average Glucose 111 <117 mg/dL    Hemoglobin A1C 5.5 0.0 - 5.6 %      Comment:      Normal <5.7%   Prediabetes 5.7-6.4%    Diabetes 6.5% or higher     Note: Adopted from ADA consensus guidelines.   Lipid panel reflex to direct LDL Fasting   Result Value Ref Range    Cholesterol 109 <200 mg/dL    Triglycerides 119 <150 mg/dL    Direct Measure HDL 45 >=40 mg/dL    LDL Cholesterol Calculated 40 <100 mg/dL    Non HDL Cholesterol 64 <130 mg/dL    Patient Fasting > 8hrs? Yes     Narrative    Cholesterol  Desirable: < 200 mg/dL  Borderline High: 200 - 239 mg/dL  High: >= 240 mg/dL    Triglycerides  Normal: < 150 mg/dL  Borderline High: 150 - 199 mg/dL  High: 200-499 mg/dL  Very High: >= 500 mg/dL    Direct Measure HDL  Female: >= 50 mg/dL   Male: >= 40 mg/dL    LDL Cholesterol  Desirable: < 100 mg/dL  Above Desirable: 100 - 129 mg/dL   Borderline High: 130 - 159 mg/dL   High:  160 - 189 mg/dL   Very High: >= 190 mg/dL    Non HDL Cholesterol  Desirable: < 130 mg/dL  Above Desirable: 130 - 159 mg/dL  Borderline High: 160 - 189 mg/dL  High: 190 - 219 mg/dL  Very High: >= 220 mg/dL   CBC with platelets   Result Value Ref Range    WBC Count 9.5 4.0 - 11.0 10e3/uL    RBC Count 5.33 4.40 - 5.90 10e6/uL    Hemoglobin 15.7 13.3 - 17.7 g/dL    Hematocrit 46.9 40.0 - 53.0 %    MCV 88 78 - 100 fL    MCH 29.5 26.5 - 33.0 pg    MCHC 33.5 31.5 - 36.5 g/dL    RDW 14.2 10.0 - 15.0 %    Platelet Count 267 150 - 450 10e3/uL   Comprehensive metabolic panel (BMP + Alb, Alk Phos, ALT, AST, Total. Bili, TP)    Result Value Ref Range    Sodium 139 135 - 145 mmol/L    Potassium 4.0 3.4 - 5.3 mmol/L    Carbon Dioxide (CO2) 27 22 - 29 mmol/L    Anion Gap 13 7 - 15 mmol/L    Urea Nitrogen 27.5 (H) 8.0 - 23.0 mg/dL    Creatinine 1.20 (H) 0.67 - 1.17 mg/dL    GFR Estimate 62 >60 mL/min/1.73m2      Comment:      eGFR calculated using 2021 CKD-EPI equation.    Calcium 9.5 8.8 - 10.4 mg/dL    Chloride 99 98 - 107 mmol/L    Glucose 104 (H) 70 - 99 mg/dL    Alkaline Phosphatase 57 40 - 150 U/L    AST 20 0 - 45 U/L    ALT 25 0 - 70 U/L    Protein Total 7.6 6.4 - 8.3 g/dL    Albumin 4.2 3.5 - 5.2 g/dL    Bilirubin Total 1.0 <=1.2 mg/dL    Patient Fasting > 8hrs? Yes        If you have any questions or concerns, please call the clinic at the number listed above.       Sincerely,          Electronically signed

## 2025-04-05 DIAGNOSIS — Z79.899 MEDICATION MANAGEMENT: ICD-10-CM

## 2025-04-07 DIAGNOSIS — Z79.899 MEDICATION MANAGEMENT: ICD-10-CM

## 2025-04-07 RX ORDER — ALLOPURINOL 300 MG/1
1 TABLET ORAL
Qty: 90 TABLET | Refills: 0 | Status: SHIPPED | OUTPATIENT
Start: 2025-04-07

## 2025-04-07 RX ORDER — ALLOPURINOL 300 MG/1
1 TABLET ORAL
Qty: 90 TABLET | Refills: 0 | OUTPATIENT
Start: 2025-04-07

## 2025-04-29 DIAGNOSIS — E11.9 TYPE 2 DIABETES MELLITUS WITHOUT COMPLICATION, WITHOUT LONG-TERM CURRENT USE OF INSULIN (H): ICD-10-CM

## 2025-04-29 DIAGNOSIS — I10 HTN (HYPERTENSION): ICD-10-CM

## 2025-04-29 DIAGNOSIS — E78.2 MIXED HYPERLIPIDEMIA: ICD-10-CM

## 2025-04-29 DIAGNOSIS — I10 ESSENTIAL HYPERTENSION: ICD-10-CM

## 2025-04-29 RX ORDER — LISINOPRIL 10 MG/1
10 TABLET ORAL DAILY
Qty: 90 TABLET | Refills: 2 | Status: SHIPPED | OUTPATIENT
Start: 2025-04-29

## 2025-04-29 RX ORDER — ATORVASTATIN CALCIUM 10 MG/1
10 TABLET, FILM COATED ORAL DAILY
Qty: 90 TABLET | Refills: 2 | Status: SHIPPED | OUTPATIENT
Start: 2025-04-29

## 2025-04-29 RX ORDER — METFORMIN HYDROCHLORIDE 500 MG/1
TABLET, EXTENDED RELEASE ORAL
Qty: 180 TABLET | Refills: 0 | Status: SHIPPED | OUTPATIENT
Start: 2025-04-29

## 2025-04-29 RX ORDER — HYDROCHLOROTHIAZIDE 12.5 MG/1
12.5 TABLET ORAL
Qty: 90 TABLET | Refills: 2 | Status: SHIPPED | OUTPATIENT
Start: 2025-04-29

## 2025-05-21 ENCOUNTER — TRANSFERRED RECORDS (OUTPATIENT)
Dept: HEALTH INFORMATION MANAGEMENT | Facility: CLINIC | Age: 80
End: 2025-05-21
Payer: COMMERCIAL

## 2025-05-28 DIAGNOSIS — R60.9 EDEMA, UNSPECIFIED TYPE: ICD-10-CM

## 2025-05-29 RX ORDER — FUROSEMIDE 20 MG/1
20 TABLET ORAL
Qty: 90 TABLET | Refills: 1 | Status: SHIPPED | OUTPATIENT
Start: 2025-05-29

## 2025-07-03 ENCOUNTER — TELEPHONE (OUTPATIENT)
Dept: FAMILY MEDICINE | Facility: CLINIC | Age: 80
End: 2025-07-03
Payer: COMMERCIAL

## 2025-07-03 NOTE — TELEPHONE ENCOUNTER
Patient calling in with right sided lower back pain for past 4 days. No other red flag symptoms. Assisted patient in scheduling appointment with PCP Advised UC if worsening pain or new symptoms      Emily Ruiz RN

## 2025-07-07 ENCOUNTER — OFFICE VISIT (OUTPATIENT)
Dept: FAMILY MEDICINE | Facility: CLINIC | Age: 80
End: 2025-07-07
Payer: COMMERCIAL

## 2025-07-07 VITALS
HEIGHT: 69 IN | RESPIRATION RATE: 16 BRPM | TEMPERATURE: 97.6 F | BODY MASS INDEX: 43.57 KG/M2 | DIASTOLIC BLOOD PRESSURE: 82 MMHG | HEART RATE: 71 BPM | OXYGEN SATURATION: 97 % | SYSTOLIC BLOOD PRESSURE: 124 MMHG | WEIGHT: 294.13 LBS

## 2025-07-07 DIAGNOSIS — S39.012S STRAIN OF LUMBAR REGION, SEQUELA: Primary | ICD-10-CM

## 2025-07-07 PROCEDURE — 3074F SYST BP LT 130 MM HG: CPT | Performed by: FAMILY MEDICINE

## 2025-07-07 PROCEDURE — 3079F DIAST BP 80-89 MM HG: CPT | Performed by: FAMILY MEDICINE

## 2025-07-07 PROCEDURE — 99213 OFFICE O/P EST LOW 20 MIN: CPT | Performed by: FAMILY MEDICINE

## 2025-07-07 RX ORDER — LIDOCAINE 50 MG/G
PATCH TOPICAL
COMMUNITY
Start: 2025-07-03

## 2025-07-07 RX ORDER — IBUPROFEN 400 MG/1
400 TABLET, FILM COATED ORAL
COMMUNITY
Start: 2025-07-03

## 2025-07-07 RX ORDER — CYCLOBENZAPRINE HCL 5 MG
5 TABLET ORAL
COMMUNITY
Start: 2025-07-03

## 2025-07-07 ASSESSMENT — ENCOUNTER SYMPTOMS: BACK PAIN: 1

## 2025-07-07 NOTE — PROGRESS NOTES
"  Assessment & Plan     Strain of lumbar region, sequela  Patient here for emergency room follow-up  Patient seen over the weekend for back discomfort.  Diagnosed with lumbar strain and started on lidocaine patches muscle relaxants and Tylenol.  Lidocaine patches have been working wonderfully for the patient he states is taken away his discomfort.  Discussed with him I suspect muscular strain will slowly be improving over the upcoming week and he can decrease use of the patches as symptoms improve.                  Mario Mayfield is a 79 year old, presenting for the following health issues:  Back Pain (Right side low back pain)      7/7/2025     1:37 PM   Additional Questions   Roomed by Mariia ARMIJO CMA   Accompanied by Self     Back Pain         Patient here for emergency room follow-up  Seen for back discomfort feels he injured his right lumbar back while twisting after getting up from the bathroom.  Patient is having no symptoms rating down his legs.  Patient had no bowel or bladder changes.  Patient was started on muscle relaxants and lidocaine patches and had great improvement of symptoms.  Patient presents today wanting to know if he can continue with lidocaine patches.  We discussed ongoing use for about another week and I would suspect symptoms to be improving and will no longer require lidocaine patches.                Objective    /82 (BP Location: Left arm, Patient Position: Sitting, Cuff Size: Adult Regular)   Pulse 71   Temp 97.6  F (36.4  C) (Oral)   Resp 16   Ht 1.753 m (5' 9\")   Wt 133.4 kg (294 lb 2 oz)   SpO2 97%   BMI 43.43 kg/m    Body mass index is 43.43 kg/m .  Physical Exam   GENERAL: alert and no distress  EYES: Eyes grossly normal to inspection, PERRL and conjunctivae and sclerae normal  RESP: no wheezes  CV: regular rates and rhythm  MS: Patient describes discomfort in the right paraspinal region of the lumbar back  PSYCH: mentation appears normal, affect " normal/bright            Signed Electronically by: Ba Miguel MD

## 2025-07-24 DIAGNOSIS — E11.9 TYPE 2 DIABETES MELLITUS WITHOUT COMPLICATION, WITHOUT LONG-TERM CURRENT USE OF INSULIN (H): ICD-10-CM

## 2025-07-24 RX ORDER — METFORMIN HYDROCHLORIDE 500 MG/1
TABLET, EXTENDED RELEASE ORAL
Qty: 180 TABLET | Refills: 0 | Status: SHIPPED | OUTPATIENT
Start: 2025-07-24

## 2025-08-13 ENCOUNTER — PATIENT OUTREACH (OUTPATIENT)
Dept: CARE COORDINATION | Facility: CLINIC | Age: 80
End: 2025-08-13
Payer: COMMERCIAL

## (undated) DEVICE — CLIP APPLIER ENDO ROTATING 10MM MED/LG ER320

## (undated) DEVICE — DECANTER VIAL 2006S

## (undated) DEVICE — TUBING LAP SUCT/IRRIG STRYKER 250070500

## (undated) DEVICE — DRAIN RND 1/4 19FR SIL 0070230

## (undated) DEVICE — SOL WATER IRRIG 1000ML BOTTLE 2F7114

## (undated) DEVICE — SU SILK 2-0 FS-1 18" 685G

## (undated) DEVICE — Device

## (undated) DEVICE — ENDO SHEARS RENEW LAP ENDOCUT SCISSOR TIP 16.5MM 3142

## (undated) DEVICE — PLATE GROUNDING ADULT W/CORD 9165L

## (undated) DEVICE — DRSG DRAIN 4X4" 7086

## (undated) DEVICE — NDL INSUFFLATION 13GA 120MM C2201

## (undated) DEVICE — SU MONOCRYL+ 4-0 18IN PS2 UND MCP496G

## (undated) DEVICE — TUBING SMOKE EVAC PNEUMOCLEAR HIGH FLOW 0620050250

## (undated) DEVICE — PREP CHLORAPREP 26ML TINTED HI-LITE ORANGE 930815

## (undated) DEVICE — ENDO TROCAR FIRST ENTRY KII FIOS Z-THRD 11X100MM CTF33

## (undated) DEVICE — GLOVE BIOGEL PI INDICATOR 8.0 LF 41680

## (undated) DEVICE — DRAIN RESERVOIR 100ML JP 0070740

## (undated) DEVICE — ENDO POUCH UNIV RETRIEVAL SYSTEM INZII 10MM CD001

## (undated) DEVICE — SOL NACL 0.9% INJ 1000ML BAG 2B1324X

## (undated) DEVICE — ENDO TROCAR FIRST ENTRY KII FIOS Z-THRD 05X100MM CTF03

## (undated) DEVICE — SUCTION MANIFOLD NEPTUNE 2 SYS 1 PORT 702-025-000

## (undated) DEVICE — CLIP LIGACLIP LG YELLOW LT400

## (undated) DEVICE — ENDO TROCAR SLEEVE KII Z-THREADED 05X100MM CTS02

## (undated) DEVICE — SURGICEL POWDER ABSORBABLE HEMOSTAT 3GM 3013SP

## (undated) DEVICE — SU VICRYL+ 0 27 UR6 VLT VCP603H

## (undated) DEVICE — ADH SKIN CLOSURE PREMIERPRO EXOFIN 1.0ML 3470

## (undated) DEVICE — APPLICATOR ENDOSCOPIC 5 SURGICEL POWDER 3123SPEA

## (undated) DEVICE — GLOVE BIOGEL PI ULTRATOUCH G SZ 7.5 42175

## (undated) DEVICE — CUSTOM PACK LAP CHOLE SBA5BLCHEA

## (undated) DEVICE — SYR 30ML LL W/O NDL 302832

## (undated) RX ORDER — FENTANYL CITRATE 50 UG/ML
INJECTION, SOLUTION INTRAMUSCULAR; INTRAVENOUS
Status: DISPENSED
Start: 2023-01-22

## (undated) RX ORDER — PROPOFOL 10 MG/ML
INJECTION, EMULSION INTRAVENOUS
Status: DISPENSED
Start: 2023-01-22

## (undated) RX ORDER — BUPIVACAINE HYDROCHLORIDE 2.5 MG/ML
INJECTION, SOLUTION EPIDURAL; INFILTRATION; INTRACAUDAL
Status: DISPENSED
Start: 2023-01-22

## (undated) RX ORDER — DEXAMETHASONE SODIUM PHOSPHATE 10 MG/ML
INJECTION, SOLUTION INTRAMUSCULAR; INTRAVENOUS
Status: DISPENSED
Start: 2023-01-22

## (undated) RX ORDER — ONDANSETRON 2 MG/ML
INJECTION INTRAMUSCULAR; INTRAVENOUS
Status: DISPENSED
Start: 2023-01-22